# Patient Record
Sex: FEMALE | Race: WHITE | Employment: FULL TIME | RURAL
[De-identification: names, ages, dates, MRNs, and addresses within clinical notes are randomized per-mention and may not be internally consistent; named-entity substitution may affect disease eponyms.]

---

## 2018-10-17 PROBLEM — F31.81 BIPOLAR II DISORDER, MOST RECENT EPISODE MAJOR DEPRESSIVE (HCC): Chronic | Status: ACTIVE | Noted: 2018-10-17

## 2018-10-17 PROBLEM — M32.9 LUPUS (SYSTEMIC LUPUS ERYTHEMATOSUS) (HCC): Status: ACTIVE | Noted: 2018-10-17

## 2018-10-17 PROBLEM — F31.81 BIPOLAR II DISORDER (HCC): Status: ACTIVE | Noted: 2018-10-17

## 2018-10-17 PROBLEM — M79.7 FIBROMYALGIA: Status: ACTIVE | Noted: 2018-10-17

## 2018-10-19 PROBLEM — I10 HYPERTENSION: Status: ACTIVE | Noted: 2018-10-19

## 2018-10-19 PROBLEM — E11.9 DIABETES (HCC): Status: ACTIVE | Noted: 2018-10-19

## 2018-10-19 PROBLEM — L03.116 CELLULITIS OF LEFT LOWER EXTREMITY: Status: ACTIVE | Noted: 2017-05-08

## 2018-10-21 PROBLEM — E78.1 PURE HYPERGLYCERIDEMIA: Status: ACTIVE | Noted: 2018-10-21

## 2018-10-21 PROBLEM — E11.8 CONTROLLED TYPE 2 DIABETES MELLITUS WITH COMPLICATION, WITHOUT LONG-TERM CURRENT USE OF INSULIN (HCC): Status: ACTIVE | Noted: 2018-10-19

## 2018-10-21 PROBLEM — L03.116 CELLULITIS OF LEFT LOWER EXTREMITY: Status: RESOLVED | Noted: 2017-05-08 | Resolved: 2018-10-21

## 2018-12-07 PROBLEM — E66.01 SEVERE OBESITY (HCC): Status: ACTIVE | Noted: 2018-12-07

## 2018-12-09 PROBLEM — S93.409A GRADE 2 ANKLE SPRAIN: Status: ACTIVE | Noted: 2018-12-09

## 2018-12-27 PROBLEM — D25.9 UTERINE LEIOMYOMA: Status: ACTIVE | Noted: 2018-11-26

## 2018-12-27 PROBLEM — Q89.01 ASPLENIA: Status: ACTIVE | Noted: 2018-12-27

## 2019-03-05 PROBLEM — S83.242A ACUTE MEDIAL MENISCUS TEAR OF LEFT KNEE: Status: RESOLVED | Noted: 2019-03-05 | Resolved: 2019-03-05

## 2019-03-05 PROBLEM — S83.242A ACUTE MEDIAL MENISCUS TEAR OF LEFT KNEE: Status: ACTIVE | Noted: 2019-03-05

## 2020-04-07 ENCOUNTER — OFFICE VISIT (OUTPATIENT)
Dept: PRIMARY CARE CLINIC | Age: 55
End: 2020-04-07

## 2020-04-07 VITALS — OXYGEN SATURATION: 94 % | TEMPERATURE: 98.8 F | HEART RATE: 93 BPM

## 2020-04-07 DIAGNOSIS — R05.9 COUGH: Primary | ICD-10-CM

## 2020-04-07 LAB
FLUAV+FLUBV AG NOSE QL IA.RAPID: NEGATIVE POS/NEG
FLUAV+FLUBV AG NOSE QL IA.RAPID: NEGATIVE POS/NEG
S PYO AG THROAT QL: NEGATIVE
VALID INTERNAL CONTROL?: YES
VALID INTERNAL CONTROL?: YES

## 2020-04-07 NOTE — PROGRESS NOTES
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 She also reports: low grade fevers, headache, sinus congestion, rhinorrhea, post nasal drip, sore throat, fatigue and myalgias. She denies a history of: productive cough and chest congestion. Treatments have included: OTC products, which have been not very effective. Relevant medical problems include include: lupus. Recent Travel Screening and Travel History documentation     Travel Screening      No screening recorded since 04/06/20 0000      Travel History   Travel since 03/07/20     No documented travel since 03/07/20                Review of Systems   Constitutional: Positive for chills, diaphoresis, fever and malaise/fatigue. HENT: Positive for congestion and sore throat. Eyes: Negative. Respiratory: Negative for shortness of breath and wheezing. Cardiovascular: Negative. Gastrointestinal: Positive for diarrhea and nausea. Negative for blood in stool and melena. Genitourinary: Negative. Musculoskeletal: Positive for myalgias. Skin: Negative for rash. Neurological: Positive for headaches. Negative for dizziness.     - Pertinent items are noted in HPI      Patient Active Problem List   Diagnosis Code    Bipolar II disorder, most recent episode major depressive ([facility] [address] F31.81    Lupus (systemic lupus erythematosus) ([facility] [address] M32.9    Fibromyalgia M79.7    Leukocytosis D72.829    Malaise and fatigue R53.81, R53.83    Hypertension I10    Controlled type 2 diabetes mellitus with complication, without long-term current use of insulin ([address] E11.8    Pure hyperglyceridemia E78.1    Severe obesity (HCC) E66.01    Grade 2 ankle sprain S93.409A    Uterine leiomyoma D25.9    Asplenia Q89.01     Home Medications    Medication Sig Start Date End Date Taking? Authorizing Provider   losartan (COZAAR) 100 mg tablet TK 1 T PO QD 1/30/20   Provider, Historical   hydroxychloroquine (Plaquenil) 200 mg tablet Take 200 mg by mouth daily. Provider, Historical   spironolactone (ALDACTONE) 25 mg tablet TAKE 1 TABLET BY MOUTH DAILY 3/25/20   Tammie Lam DO   rosuvastatin (CRESTOR) 10 mg tablet TAKE 1 TABLET BY MOUTH ONCE DAILY 3/23/20   Maya Zuniga NP   FLUoxetine (PROZAC) 20 mg capsule Take 1 Cap by mouth daily. 1/22/20   Naomi Robert MD   OLANZapine (ZYPREXA) 2.5 mg tablet Take 1 Tab by mouth nightly. 1/22/20   Naomi Robert MD   meloxicam (MOBIC) 7.5 mg tablet TK 1 T PO QD WITH FOOD 1/15/20   Provider, Historical   metFORMIN ER (GLUCOPHAGE XR) 500 mg tablet take 1 tablet by mouth once daily with dinner 12/20/19   Tabitha Hernandez NP   clonazePAM (KLONOPIN) 0.5 mg tablet Take 1 Tab by mouth daily as needed (Anxiety). 12/10/19   MD PAXTON Salgado Mc VERIO strip as directed TEST 3 TIMES A WEEK 8/26/19   Tabitha Hernandez NP   aspirin delayed-release 81 mg tablet Take  by mouth daily. Provider, Historical   lancets (ONE TOUCH DELICA) 33 gauge misc OneTouch Delica Lancets 33 gauge    Provider, Historical   albuterol (PROAIR HFA) 90 mcg/actuation inhaler INHALE 1 TO 2 PUFFS BY INHALATION ROUTE EVERY 4 TO 6 HOURS 9/22/17   Provider, Historical   EPINEPHrine (EPIPEN) 0.3 mg/0.3 mL injection INJECT 0.3 MILLILITER BY INTRAMUSCULAR ROUTE ONCE AS NEEDED 9/22/17   Provider, Historical   tiZANidine (ZANAFLEX) 6 mg capsule  4/24/17   Provider, Historical      Allergies   Allergen Reactions    Latex Unable to Obtain    Other Food Anaphylaxis     Pitted Fruits.     [place] Anaphylaxis    Ciprofloxacin Unable to Obtain    Levaquin [Levofloxacin] Unable to Obtain    Morphine Unable to Obtain    Pcn [Penicillins] Unable to Obtain    Vancomycin Unable to Obtain          Objective:   Physical Exam  General:  alert, cooperative, no distress, appears fatigued   Ears: normal TM's and external ear canals AU   Sinuses: Normal paranasal sinuses without tenderness   Mouth:  Posterior oropharyngeal erythema with post nasal drip   Neck: supple, symmetrical, trachea midline and no adenopathy. Heart: tachycardia, no murmurs noted, no gallops noted. Lungs: clear to auscultation bilaterally       Visit Vitals  Pulse 93   Temp 98.8 °F (37.1 °C) (Oral)   SpO2 94%         Assessment/Plan:        Possible COVID.    - AMB POC RAPID STREP A - negative  - AMB POC BINAX NOW INFLUENZA TEST - negative  2. Tylenol for elevated temp; oral hydration; The patient was advised to return to (or contact) the clinic or go to the ER for any ALARM sx such as temp > 101.5, worsening cough, sputum production, confusion or shortness of breath. Luis Angel [address]:  [phone]  4. Quarantine:  14 days          Tylenol for elevated temp; oral hydration; The patient was advised to return to (or contact) the clinic or go to the ER for any ALARM sx such as temp > 101.5, worsening cough, sputum production, confusion or shortness of breath. Reviewed with patient that COVID-19 pandemic is an evolving situation with rapidly changing recommendations & guidelines. Medical decisions are made based on the the best information available at the time. Recommended patient stay tuned for updates published by trusted sources and to advise your PCP of any unexpected changes in clinical condition. Reviewed with male that COVID-19 pandemic is an evolving situation with rapidly changing recommendations & guidelines. Medical decisions are made based on the the best information available at the time.   Recommended male stay tuned for updates published by trusted sources and to advise your PCP of any unexpected changes in clinical condition    Reviewed with her that COVID-19 pandemic is an evolving situation with rapidly changing recommendations & guidelines. Medical decisions are made based on the the best information available at the time. Recommended she stay tuned for updates published by trusted sources and to advise your PCP of any unexpected changes in clinical condition     Disclaimer:  Discussed expected course/resolution/complications of diagnosis in detail with patient. Medication risks/benefits/costs/interactions/alternatives discussed with patient. She was given an after visit summary which includes diagnoses, current medications, & vitals. She expressed understanding with the diagnosis and plan.         Marly Martinez, DO

## 2020-06-09 ENCOUNTER — OFFICE VISIT (OUTPATIENT)
Dept: PRIMARY CARE CLINIC | Age: 55
End: 2020-06-09

## 2020-06-09 VITALS — OXYGEN SATURATION: 95 % | TEMPERATURE: 99 F | HEART RATE: 68 BPM

## 2020-06-09 DIAGNOSIS — Z20.822 EXPOSURE TO 2019 NOVEL CORONAVIRUS: Primary | ICD-10-CM

## 2020-06-09 NOTE — PROGRESS NOTES
Recent Travel Screening and Travel History documentation     Travel Screening      No screening recorded since 06/08/20 0000      Travel History   Travel since 05/09/20     No documented travel since 05/09/20                Subjective:   Ashwin Lock was seen today for Fever (started Thursday)            Recent Travel Screening and Travel History documentation     Travel Screening      No screening recorded since 06/08/20 0000      Travel History   Travel since 05/09/20     No documented travel since 05/09/20                ROS - Pertinent items are noted in HPI      Patient Active Problem List   Diagnosis Code    Bipolar II disorder, most recent episode major depressive ([facility] [address] F31.81    Lupus (systemic lupus erythematosus) ([facility]) M32.9    Fibromyalgia M79.7    Leukocytosis D72.829    Malaise and fatigue R53.81, R53.83    Hypertension I10    Controlled type 2 diabetes mellitus with complication, without long-term current use of insulin ([facility]) E11.8    Pure hyperglyceridemia E78.1    Severe obesity ([facility]) E66.01    Grade 2 ankle sprain S93.409A    Uterine leiomyoma D25.9    Asplenia Q89.01     Home Medications    Medication Sig Start Date End Date Taking? Authorizing Provider   guaiFENesin-dextromethorphan (TUSSI-ORGANIDIN DM)  mg/5 mL liqd Take 10 mL by mouth every four (4) hours as needed for Cough. 4/13/20   Rosa Maria Mercedes, DO   albuterol (ProAir HFA) 90 mcg/actuation inhaler Take 2 Puffs by inhalation every four (4) hours as needed for Wheezing or Shortness of Breath. 4/13/20   Angelita Lam,    losartan (COZAAR) 100 mg tablet TK 1 T PO QD 4/7/20   Tammie Lam DO   hydroxychloroquine (Plaquenil) 200 mg tablet Take 200 mg by mouth daily.     Provider, Historical   spironolactone (ALDACTONE) 25 mg tablet TAKE 1 TABLET BY MOUTH DAILY 3/25/20   Tammie Lam DO   rosuvastatin (CRESTOR) 10 mg tablet TAKE 1 TABLET BY MOUTH ONCE DAILY 3/23/20   Yuli Landon NP   FLUoxetine (PROZAC) 20 mg capsule Take 1 Cap by mouth daily. 1/22/20   Marcelino Mora MD   OLANZapine (ZYPREXA) 2.5 mg tablet Take 1 Tab by mouth nightly. 1/22/20   Marcelino Mora MD   meloxicam (MOBIC) 7.5 mg tablet TK 1 T PO QD WITH FOOD 1/15/20   Provider, Historical   metFORMIN ER (GLUCOPHAGE XR) 500 mg tablet take 1 tablet by mouth once daily with dinner 12/20/19   Calista Reis NP   clonazePAM (KLONOPIN) 0.5 mg tablet Take 1 Tab by mouth daily as needed (Anxiety). 12/10/19   Marcelino Mora MD   ONETOUCH VERIO strip as directed TEST 3 TIMES A WEEK 8/26/19   Calista Reis NP   aspirin delayed-release 81 mg tablet Take  by mouth daily. Provider, Historical   lancets (ONE TOUCH DELICA) 33 gauge misc OneTouch Delica Lancets 33 gauge    Provider, Historical   EPINEPHrine (EPIPEN) 0.3 mg/0.3 mL injection INJECT 0.3 MILLILITER BY INTRAMUSCULAR ROUTE ONCE AS NEEDED 9/22/17   Provider, Historical   tiZANidine (ZANAFLEX) 6 mg capsule  4/24/17   Provider, Historical      Allergies   Allergen Reactions    Latex Unable to Obtain    Other Food Anaphylaxis     Pitted Fruits.  [place] Anaphylaxis    Ciprofloxacin Unable to Obtain    Levaquin [Levofloxacin] Unable to Obtain    Morphine Unable to Obtain    Pcn [Penicillins] Unable to Obtain    Vancomycin Unable to Obtain          Objective:   Physical Exam  General:  alert, cooperative, no distress   eyes: Mild injection   Sinuses: Normal paranasal sinuses without tenderness   Mouth:  Lips, mucosa, and tongue normal. Teeth and gums normal   Neck: supple, symmetrical, trachea midline and no adenopathy. Heart: normal rate, regular rhythm, normal S1, S2, no murmurs, rubs, clicks or gallops. Lungs: clear to auscultation bilaterally       Visit Vitals  Pulse 68   Temp 99 °F (37.2 °C) (Oral)   SpO2 95%         Assessment/Plan:        Possible COVID. covid testing done 06/09/20   2. Tylenol for elevated temp; oral hydration;  The patient was advised to return to (or contact) the clinic or go to the ER for any ALARM sx such as temp > 101.5, worsening cough, sputum production, confusion or shortness of breath. Luis Angelyaniv Priestutashlie Hernandez [address]:  [phone]  4. Quarantine:  14 days      Tylenol for elevated temp; oral hydration; The patient was advised to return to (or contact) the clinic or go to the ER for any ALARM sx such as temp > 101.5, worsening cough, sputum production, confusion or shortness of breath. Reviewed with patient that COVID-19 pandemic is an evolving situation with rapidly changing recommendations & guidelines. Medical decisions are made based on the the best information available at the time. Recommended patient stay tuned for updates published by trusted sources and to advise your PCP of any unexpected changes in clinical condition. Reviewed with male that COVID-19 pandemic is an evolving situation with rapidly changing recommendations & guidelines. Medical decisions are made based on the the best information available at the time. Recommended male stay tuned for updates published by trusted sources and to advise your PCP of any unexpected changes in clinical condition    Reviewed with her that COVID-19 pandemic is an evolving situation with rapidly changing recommendations & guidelines. Medical decisions are made based on the the best information available at the time. Recommended she stay tuned for updates published by trusted sources and to advise your PCP of any unexpected changes in clinical condition     Disclaimer:  Discussed expected course/resolution/complications of diagnosis in detail with patient. Medication risks/benefits/costs/interactions/alternatives discussed with patient. She was given an after visit summary which includes diagnoses, current medications, & vitals. She expressed understanding with the diagnosis and plan.         Ximena Meth, DO

## 2020-06-09 NOTE — LETTER
NOTIFICATION RETURN TO WORK / SCHOOL 
 
6/9/2020 Ms. Ferny Taylor [place] [phone] To Whom It May Concern: 
 
Ferny Taylor was tested for COVID-19 on 6/9/2020 She may return to work after 3 days of being fever free and with a negative covid result, which is pending as of today. Karine Arvizu If there are questions or concerns, please have the patient contact our office. Sincerely, Monica Jacome, DO

## 2020-06-09 NOTE — LETTER
NOTIFICATION RETURN TO WORK / SCHOOL 
 
6/9/2020 Ms. Marciano Salvador [facility] [phone] To Whom It May Concern: 
 
Marciano Salvador was tested for COVID-19 on 6/9/2020, and the result was negative. She may return to work after 3 days of being fever free. If there are questions or concerns, please have the patient contact our office. Sincerely, Ruben Felder, DO

## 2020-06-12 ENCOUNTER — PATIENT MESSAGE (OUTPATIENT)
Dept: PRIMARY CARE CLINIC | Age: 55
End: 2020-06-12

## 2020-06-12 LAB — SARS-COV-2, NAA: NOT DETECTED

## 2020-06-17 NOTE — TELEPHONE ENCOUNTER
From: Mina Judge  To: Marly Martinez DO  Sent: 6/12/2020 8:43 AM EDT  Subject: Test Results Question    I have a question about NOVEL CORONAVIRUS (COVID-19) resulted on 6/12/20 at 8:35 AM. Still having nasal congestion and fever of 99.4 to 99.8. Just wantes you to know. Thank you.  Arelis Davila

## 2020-08-11 ENCOUNTER — OFFICE VISIT (OUTPATIENT)
Dept: PRIMARY CARE CLINIC | Age: 55
End: 2020-08-11

## 2020-08-11 DIAGNOSIS — Z20.828 EXPOSURE TO SARS-ASSOCIATED CORONAVIRUS: ICD-10-CM

## 2020-08-11 DIAGNOSIS — J02.9 SORE THROAT: Primary | ICD-10-CM

## 2020-08-11 LAB
S PYO AG THROAT QL: NEGATIVE
VALID INTERNAL CONTROL?: YES

## 2020-08-11 NOTE — PROGRESS NOTES
Pt presents to the flu clinic for covid testing. Pt reports mild sx and possible exposure. Pt declined to see a doctor today.  KT

## 2020-08-13 LAB — SARS-COV-2, NAA: NOT DETECTED

## 2021-01-13 ENCOUNTER — PATIENT MESSAGE (OUTPATIENT)
Dept: FAMILY MEDICINE CLINIC | Age: 56
End: 2021-01-13

## 2021-01-13 NOTE — LETTER
NOTIFICATION RETURN TO WORK  
 
1/18/2021 3:13 PM 
 
Ms. Guy Cowart [place] [phone] To Whom It May Concern: 
 
Guy Cowart is currently under the care of Sarah Chu. She will return to work on: 1/18/2021 and should be excused from 01/13/2021 - 1/15/2021. If there are questions or concerns please have the patient contact our office. Sincerely, Lauren Atwood, DO

## 2021-01-18 NOTE — TELEPHONE ENCOUNTER
From: [facility]  To: Marylinmarge YignmanDO  Sent: 1/13/2021 6:55 PM EST  Subject: Visit Follow-Up Question    Hello. I forgot to ask you for a work note for 1-13 thru 1-15 and to return to work on 1-18. I can print it from my chart. Thank you.    Kelby Du

## 2021-01-20 ENCOUNTER — PATIENT MESSAGE (OUTPATIENT)
Dept: FAMILY MEDICINE CLINIC | Age: 56
End: 2021-01-20

## 2021-01-21 NOTE — TELEPHONE ENCOUNTER
From: Abilio Sutherland  To: Aristeo Gould DO  Sent: 1/20/2021 9:42 PM EST  Subject: Non-Urgent Medical Question    Hello. I received a message about the covid vaccine. Do you have it in for pts? I did get the first vaccine Bernannemarie Landaverde) on 1-14-21. Could I get the 2nd vaccine booster at this office? Im due to receive it 2-11-21.    Thank you  Corrine Orozco

## 2021-03-17 ENCOUNTER — TELEPHONE (OUTPATIENT)
Dept: FAMILY MEDICINE CLINIC | Age: 56
End: 2021-03-17

## 2021-03-17 DIAGNOSIS — M32.9 SYSTEMIC LUPUS ERYTHEMATOSUS, UNSPECIFIED SLE TYPE, UNSPECIFIED ORGAN INVOLVEMENT STATUS (HCC): ICD-10-CM

## 2021-03-17 DIAGNOSIS — Q89.01 ASPLENIA: ICD-10-CM

## 2021-03-17 DIAGNOSIS — E11.8 CONTROLLED TYPE 2 DIABETES MELLITUS WITH COMPLICATION, WITHOUT LONG-TERM CURRENT USE OF INSULIN (HCC): ICD-10-CM

## 2021-03-17 DIAGNOSIS — I10 ESSENTIAL HYPERTENSION: ICD-10-CM

## 2021-03-17 DIAGNOSIS — E78.1 PURE HYPERGLYCERIDEMIA: Primary | ICD-10-CM

## 2021-03-17 NOTE — TELEPHONE ENCOUNTER
Pt is scheduled to see Dr. Nadia Rhoades April 21st. Pt wants to know if she needs to come before her appt for labs?

## 2021-03-18 ENCOUNTER — TELEPHONE (OUTPATIENT)
Dept: FAMILY MEDICINE CLINIC | Age: 56
End: 2021-03-18

## 2021-03-18 DIAGNOSIS — K52.9 CHRONIC DIARRHEA OF UNKNOWN ORIGIN: Primary | ICD-10-CM

## 2021-03-18 NOTE — TELEPHONE ENCOUNTER
----- Message from Stew Plascencia sent at 3/18/2021  2:43 PM EDT -----  Regarding: Do Good/Telephone  Appointment not available    Caller's first and last name and relationship to patient (if not the patient): n/a      Best contact number: [phone]      Preferred date and time: as soon as possible       Scheduled appointment date and time:      Reason for appointment: pains and burning in stomach, diarrhea       Details to clarify the request: n/a      Stew Plascencia

## 2021-03-18 NOTE — TELEPHONE ENCOUNTER
Pt asked if she could stop by tomorrow and  a stool sample kit for her issues. See previous message. Call pt to let her know if she can stop by tomorrow to  the kit?  LVM if pt doesn't answer

## 2021-03-22 DIAGNOSIS — K52.9 CHRONIC DIARRHEA OF UNKNOWN ORIGIN: ICD-10-CM

## 2021-03-23 ENCOUNTER — HOSPITAL ENCOUNTER (OUTPATIENT)
Dept: BEHAVIORAL/MENTAL HEALTH | Age: 56
Discharge: HOME | End: 2021-03-23

## 2021-03-23 LAB
CAMPYLOBACTER SPECIES, DNA: NEGATIVE
ENTEROTOXIGEN E COLI, DNA: NEGATIVE
P SHIGELLOIDES DNA STL QL NAA+PROBE: NEGATIVE
SALMONELLA SPECIES, DNA: NEGATIVE
SHIGA TOXIN PRODUCING, DNA: NEGATIVE
SHIGELLA SP+EIEC IPAH STL QL NAA+PROBE: NEGATIVE
VIBRIO SPECIES, DNA: NEGATIVE
WBC #/AREA STL HPF: NORMAL /HPF (ref 0–4)
Y. ENTEROCOLITICA, DNA: NEGATIVE

## 2021-03-23 RX ORDER — OLANZAPINE 5 MG/1
5 TABLET ORAL
Qty: 30 TAB | Refills: 5 | Status: SHIPPED | OUTPATIENT
Start: 2021-03-23 | End: 2021-05-03 | Stop reason: SDUPTHER

## 2021-03-23 RX ORDER — FLUOXETINE HYDROCHLORIDE 20 MG/1
20 CAPSULE ORAL DAILY
Qty: 30 CAP | Refills: 5 | Status: SHIPPED | OUTPATIENT
Start: 2021-03-23 | End: 2021-09-22 | Stop reason: SDUPTHER

## 2021-03-23 NOTE — PROGRESS NOTES
Consent: The patient and/or their healthcare decision maker is aware that they may receive a bill for this audio only encounter, depending on their insurance coverage, and has provided verbal consent to proceed: Yes.     INTERVAL HISTORY (Audio Only): Mrs. Ferrer is a 64-year-old  white female who is following up with me 7 months since her last appointment re: a history of Bipolar disorder (type II) with a predominance of depressive symptoms/episodes, along with some mixed Anxiety symptoms.  She had been followed by several psychiatrists in [address] [place] [address] since 1997, but over 3 years ago she and her  moved to [place] related to his work, and she wanted to be followed closer to home. 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 She was feeling \"really good\" the last two appointments.     She states that she's been feeling more \"rodas\" and irritable for awhile now. She feels it's mostly the Bipolar disorder rather than the various stressors that she's dealing with. She hasn't had any true manic or hypomanic episodes, but she's experiencing more mixed affective symptoms overall. She's had some moments of dysphoria, but no feelings of hopelessness or any SI at all. We discussed Rx options and we'll first increase the Olanzapine slightly to 5 mg qhs to provide better mood stability and less irritability.  No SE's reported or noted with the current meds including no weight gain, EPSE's, TD sx's, etc.      CURRENT MEDICATIONS:  1.  Prozac 20 mg daily  2.  Zyprexa 2.5 mg qhs--since 2009 (highest dose was 7.5 mg)  3.  Clonazepam 0.5 mg bid prn--taking 0.25 mg bid      MENTAL STATUS EXAM:  May was noted to be very pleasant and cooperative, but reported having some affective instability, albeit fairly mild. She currently was fairly euthymic, but she noted that her mood can change more easily of late. She still denied having any significant neurovegetative dysfunction, and denied any feelings of hopelessness, suicidal thinking, or any passive thoughts about wishing she were dead. There was no evidence of any ebony or hypomania at all, and she denied any symptoms of anxiety as well. There was no evidence of any psychosis such as hallucinations or delusional thinking.  Her judgment and insight appears to be quite good.  Her cognitive functioning appears to be fully intact with no deficits noted.     DIAGNOSTIC IMPRESSION:  AXIS I:  Bipolar disorder type II, some increased mixed symptoms (F31.81)                Unspecified Anxiety disorder (F41.9)  AXIS II:  Deferred. AXIS III:  Systemic lupus; fibromyalgia; hypertension; hyperglycemia; hypercholesterolemia     PLAN:  1.  Continue the Prozac at 20 mg daily--#30 with 5 refills (30 day). 2.  Increase the Zyprexa to 5 mg at bedtime--#30 with 5 refills (30 day). May drop back to 2.5 mg at some point. 3.  Continue the Clonazepam at 0.5 mg bid prn--has 4- months of refills (30 day). 4.  Follow up with me in 6 months, sooner if needed.     I affirm this is a Patient-Initiated-Episode with a patient who has not had a related appointment within my department in the past 7 days or scheduled within the next 24 hours. Total Time: 13 minutes  Note: not billable if the call serves to triage the patient into an appointment for the relevant concern. Patient was evaluated by phone call and had no access to a computer or smart phone. Chart reviewed. Evaluated and management per the note above. POS: patient at home; provider in office.

## 2021-03-24 ENCOUNTER — TELEPHONE (OUTPATIENT)
Dept: FAMILY MEDICINE CLINIC | Age: 56
End: 2021-03-24

## 2021-03-24 LAB
G LAMBLIA AG STL QL IA: NEGATIVE
SPECIMEN SOURCE: NORMAL

## 2021-03-24 NOTE — TELEPHONE ENCOUNTER
Thanks - can we reach out to the patient and have her  one of the test kits that we can send in?   CLLJGW!@

## 2021-03-24 NOTE — TELEPHONE ENCOUNTER
Received a call from Susanna Sanz at [facility] she states that they received a bunch of stool studies for the patient. She states that a test for occult blood was ordered but that is not a test they preform.     She recommended a fit test be sent to [address]

## 2021-03-25 ENCOUNTER — PATIENT MESSAGE (OUTPATIENT)
Dept: FAMILY MEDICINE CLINIC | Age: 56
End: 2021-03-25

## 2021-03-25 DIAGNOSIS — K29.70 GASTRITIS, PRESENCE OF BLEEDING UNSPECIFIED, UNSPECIFIED CHRONICITY, UNSPECIFIED GASTRITIS TYPE: Primary | ICD-10-CM

## 2021-03-25 LAB
CALPROTECTIN STL-MCNT: 44 UG/G (ref 0–120)
H PYLORI AG STL QL IA: NEGATIVE
SPECIMEN SOURCE: NORMAL

## 2021-03-25 NOTE — PROGRESS NOTES
Results and comments sent through my chart to patient. Stool studies were negative for H. pylori, Giardia, or different types of enteric bacteria. We are still waiting on the results of a few other labs for the stool studies. Hope you are doing well!

## 2021-03-26 NOTE — PROGRESS NOTES
Results and comments sent through my chart to patient. Still waiting for the other labs come back but at least we can say that the fecal calprotectin is negative.   This test is used to detect inflammation

## 2021-03-29 LAB — LACTOFERRIN, LCTFLT: <1 UG/ML(G) (ref 0–7.24)

## 2021-03-29 NOTE — TELEPHONE ENCOUNTER
From: Saba Garcia  To: Kike Fonseca DO  Sent: 3/25/2021 1:45 PM EDT  Subject: Test Results Question    Hello. Still having daily stomach pain/burning. Taking pepcid 20mg a day. Do I need scan or upper GI? Surgical specialists in [place] do uppers. Thank you.  Andriy Spicer

## 2021-03-30 LAB
FAT STL QL: NORMAL
NEUTRAL FAT STL QL: NORMAL

## 2021-03-30 NOTE — PROGRESS NOTES
Results and comments sent through my chart to patient. Fecal fat and lactoferrin are both negative. The fecal fat would indicate if there was any problems with absorption and the lactoferrin which show if there is any inflammation. How are you doing?

## 2021-04-12 ENCOUNTER — OFFICE VISIT (OUTPATIENT)
Dept: SURGERY | Age: 56
End: 2021-04-12
Payer: COMMERCIAL

## 2021-04-12 VITALS
HEART RATE: 92 BPM | BODY MASS INDEX: 35.67 KG/M2 | OXYGEN SATURATION: 97 % | TEMPERATURE: 98.2 F | SYSTOLIC BLOOD PRESSURE: 147 MMHG | DIASTOLIC BLOOD PRESSURE: 80 MMHG | RESPIRATION RATE: 16 BRPM | WEIGHT: 195 LBS

## 2021-04-12 DIAGNOSIS — R10.10 UPPER ABDOMINAL PAIN: Primary | ICD-10-CM

## 2021-04-12 PROCEDURE — 99204 OFFICE O/P NEW MOD 45 MIN: CPT | Performed by: SURGERY

## 2021-04-12 NOTE — PROGRESS NOTES
1. Have you been to the ER, urgent care clinic since your last visit? Hospitalized since your last visit? new pt    2. Have you seen or consulted any other health care providers outside of the [address] [place] [address] since your last visit? Include any pap smears or colon screening.  new pt

## 2021-04-12 NOTE — PATIENT INSTRUCTIONS
Upper GI Endoscopy: Before Your Procedure What is an upper GI endoscopy? An upper gastrointestinal (or GI) endoscopy is a test that allows your doctor to look at the inside of your esophagus, stomach, and the first part of your small intestine, called the duodenum. The esophagus is the tube that carries food to your stomach. The doctor uses a thin, lighted tube that bends. It is called an endoscope, or scope. The doctor puts the tip of the scope in your mouth and gently moves it down your throat. The scope is a flexible video camera. The doctor looks at a monitor (like a TV set or a computer screen) as he or she moves the scope. A doctor may do this procedure to look for ulcers, tumors, infection, or bleeding. It also can be used to look for signs of acid backing up into your esophagus. This is called gastroesophageal reflux disease, or GERD. The doctor can use the scope to take a sample of tissue for study (a biopsy). The doctor also can use the scope to take out growths or stop bleeding. Follow-up care is a key part of your treatment and safety. Be sure to make and go to all appointments, and call your doctor if you are having problems. It's also a good idea to know your test results and keep a list of the medicines you take. How do you prepare for the procedure? Procedures can be stressful. This information will help you understand what you can expect. And it will help you safely prepare for your procedure. Preparing for the procedure 
  · Do not eat or drink anything for 6 to 8 hours before the test. An empty stomach helps your doctor see your stomach clearly during the test. It also reduces your chances of vomiting. If you vomit, there is a small risk that the vomit could enter your lungs.  (This is called aspiration.) If the test is done in an emergency, a tube may be inserted through your nose or mouth to empty your stomach.  
  · Do not take sucralfate (Carafate) or antacids on the day of the test. These medicines can make it hard for your doctor to see your upper GI tract.  
  · If your doctor tells you to, stop taking iron supplements 7 to 14 days before the test.  
  · Be sure you have someone to take you home. Anesthesia and pain medicine will make it unsafe for you to drive or get home on your own.  
  · Understand exactly what procedure is planned, along with the risks, benefits, and other options. · Tell your doctor ALL the medicines, vitamins, supplements, and herbal remedies you take. Some may increase the risk of problems during your procedure. Your doctor will tell you if you should stop taking any of them before the procedure and how soon to do it.  
  · If you take aspirin or some other blood thinner, ask your doctor if you should stop taking it before your procedure. Make sure that you understand exactly what your doctor wants you to do. These medicines increase the risk of bleeding.  
  · Make sure your doctor and the hospital have a copy of your advance directive. If you don't have one, you may want to prepare one. It lets others know your health care wishes. It's a good thing to have before any type of surgery or procedure. What happens on the day of the procedure? · Follow the instructions exactly about when to stop eating and drinking. If you don't, your procedure may be canceled. If your doctor told you to take your medicines on the day of the procedure, take them with only a sip of water.  
  · Take a bath or shower before you come in for your procedure. Do not apply lotions, perfumes, deodorants, or nail polish.  
  · Take off all jewelry and piercings. And take out contact lenses, if you wear them. At the hospital or surgery center · Bring a picture ID.  
  · The test may take 15 to 30 minutes.  
  · The doctor may spray medicine on the back of your throat to numb it. You also will get medicine to prevent pain and to relax you.  
  · You will lie on your left side.  The doctor will put the scope in your mouth and toward the back of your throat. The doctor will tell you when to swallow. This helps the scope move down your throat. You will be able to breathe normally. The doctor will move the scope down your esophagus into your stomach. The doctor also may look at the duodenum.  
  · If your doctor wants to take a sample of tissue for a biopsy, he or she may use small surgical tools, which are put into the scope, to cut off some tissue. You will not feel a biopsy, if one is taken. The doctor also can use the tools to stop bleeding or to do other treatments, if needed.  
  · You will stay at the hospital or surgery center for 1 to 2 hours until the medicine you were given wears off. What happens after an upper GI endoscopy? · After the test, you may belch and feel bloated for a while.  
  · You may have a tickling, dry throat or mouth. You may feel a bit hoarse, and you may have a mild sore throat. These symptoms may last several days. Throat lozenges and warm saltwater gargles can help relieve the throat symptoms.  
  · Don't drive or operate machinery for 12 hours after the test.  
  · Your doctor will tell you when you can go back to your usual diet and activities.  
  · Don't drink alcohol for 12 to 24 hours after the test.  
When should you call your doctor? · You have questions or concerns.  
  · You don't understand how to prepare for your procedure.  
  · You become ill before the procedure (such as fever, flu, or a cold).  
  · You need to reschedule or have changed your mind about having the procedure. Where can you learn more? Go to http://www.gray.com/ Enter P790 in the search box to learn more about \"Upper GI Endoscopy: Before Your Procedure. \" Current as of: April 15, 2020               Content Version: 12.8 © 3407-9403 [employer].   
Care instructions adapted under license by [employer] (which disclaims liability or warranty for this information). If you have questions about a medical condition or this instruction, always ask your healthcare professional. [place] [address] any warranty or liability for your use of this information.

## 2021-04-12 NOTE — PROGRESS NOTES
Violetta Loaiza is a 48 y.o. female who presents today with the following:  Chief Complaint   Patient presents with    Abdominal Pain       HPI    60-year-old female who presents to us as a referral by Dr. Km Mejia for abdominal pain that has been present for approximately 8 months. She states most of this pain is toward the left upper quadrant. At that point she was having diarrhea and states that she was diagnosed with Campylobacter and treated with Flagyl. The symptoms have resolved in terms of the diarrhea but the pain in the left upper quadrant still persist.  It is not associated with nausea or vomiting or reflux symptoms. She states that typically as soon as she eats within a few seconds she will develop left upper quadrant abdominal pain with no radiation. Again there is no nausea or vomiting with this. It will typically last 10 to 15 minutes and then resolve. She had tried a course of a PPI with no real improvement and was told to discontinue this because this could affect her bone density and started on Pepcid 40 mg a day about a month ago. She states this has not resulted in significant improvement. She describes her pain as a crampy, burning pain. She has gained approximately 20 pounds over the last 8 months. She had a colonoscopy out 3 years ago and stated they found small polyps. About 8 months ago she started taking meloxicam but stopped it about a month and a half ago when she saw a rheumatologist and told her that she was having abdominal pain. She states she carries a diagnosis of fibromyalgia and lupus. 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 She does not drink or smoke.       Past Medical History:   Diagnosis Date    Depression     Diabetes ([facility]     Fibromyalgia     Hypertension     Lupus (systemic lupus erythematosus) ([facility]     Menopause        Past Surgical History:   Procedure Laterality Date    HX CYST REMOVAL      HX DILATION AND CURETTAGE  2015    HX SPLENECTOMY         Social History     Socioeconomic History    Marital status:      Spouse name: Not on file    Number of children: Not on file    Years of education: Not on file    Highest education level: Not on file   Occupational History    Occupation: [employer]   Social Needs    Financial resource strain: Not on file    Food insecurity     Worry: Not on file     Inability: Not on file    Transportation needs     Medical: Not on file     Non-medical: Not on file   Tobacco Use    Smoking status: Never Smoker    Smokeless tobacco: Never Used   Substance and Sexual Activity    Alcohol use: No     Frequency: Never     Binge frequency: Never    Drug use: No    Sexual activity: Yes   Lifestyle    Physical activity     Days per week: 3 days     Minutes per session: 30 min    Stress: Not at all   Relationships    Social connections     Talks on phone: Not on file     Gets together: Not on file     Attends Gnosticist service: Not on file     Active member of club or organization: Not on file     Attends meetings of clubs or organizations: Not on file     Relationship status: Not on file    Intimate partner violence     Fear of current or ex partner: Not on file     Emotionally abused: Not on file     Physically abused: Not on file     Forced sexual activity: Not on file   Other Topics Concern     Service No    Blood Transfusions No    Caffeine Concern No    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  Hobby Hazards No    Sleep Concern No    Stress Concern Yes    Weight Concern Yes    Special Diet Yes     Comment: Food Allergies & Diabetic    Back Care Yes    Exercise Yes    Bike Helmet Not Asked    Seat Belt Yes    Self-Exams Yes   Social History Narrative    Not on file       Family History   Problem Relation Age of Onset    Other Mother         Mental Illness    Cancer Father         CLL.     Diabetes Father     Hypertension Father        Allergies   Allergen Reactions    Latex Unable to Obtain    Other Food Anaphylaxis     Pitted Fruits.  [place] Anaphylaxis    Ciprofloxacin Unable to Obtain    Levaquin [Levofloxacin] Unable to Obtain    Morphine Unable to Obtain    Pcn [Penicillins] Unable to Obtain    Vancomycin Unable to Obtain       Current Outpatient Medications   Medication Sig    rosuvastatin (CRESTOR) 10 mg tablet TAKE 1 TABLET BY MOUTH DAILY    FLUoxetine (PROzac) 20 mg capsule Take 1 Cap by mouth daily.  OLANZapine (ZyPREXA) 5 mg tablet Take 1 Tab by mouth nightly.  spironolactone (ALDACTONE) 25 mg tablet TAKE 1 TABLET BY MOUTH DAILY    EPINEPHrine (EPIPEN) 0.3 mg/0.3 mL injection INJECT 0.3 ML INTO THE MUSCLE AS DIRECTED ONCE AS NEEDED FOR ALLERGIC RESPONSE. CALL HEALTHCARE PROVIDER OR EMERGENCY ROOM IMMEDIATELY    dexAMETHasone (DECADRON) 6 mg tablet Take one tablet on day 1, repeat dose on day 3 and day 5    clonazePAM (KlonoPIN) 0.5 mg tablet Take 1 Tab by mouth daily as needed (Anxiety).  glucose blood VI test strips (OneTouch Verio test strips) strip OneTouch Verio test strips    metFORMIN ER (GLUCOPHAGE XR) 500 mg tablet TAKE 1 TABLET BY MOUTH ONCE DAILY WITH DINNER    omeprazole (PRILOSEC) 20 mg capsule Take 20 mg by mouth. Indications: gastroesophageal reflux disease, heartburn    albuterol (ProAir HFA) 90 mcg/actuation inhaler Take 2 Puffs by inhalation every four (4) hours as needed for Wheezing or Shortness of Breath.  losartan (COZAAR) 100 mg tablet TK 1 T PO QD    hydroxychloroquine (Plaquenil) 200 mg tablet Take 200 mg by mouth two (2) times a day.  meloxicam (MOBIC) 7.5 mg tablet TK 1 T PO QD WITH FOOD    ONETOUCH VERIO strip as directed TEST 3 TIMES A WEEK    aspirin delayed-release 81 mg tablet Take  by mouth daily.     lancets (ONE TOUCH DELICA) 33 gauge misc OneTouch Delica Lancets 33 gauge    tiZANidine (ZANAFLEX) 6 mg capsule     cephALEXin (KEFLEX) 500 mg capsule TAKE 1 CAPSULE BY MOUTH THREE TIMES DAILY UNTIL FINISHED    guaiFENesin-dextromethorphan (TUSSI-ORGANIDIN DM)  mg/5 mL liqd Take 10 mL by mouth every four (4) hours as needed for Cough. No current facility-administered medications for this visit. The above histories, medications and allergies have been reviewed. Review of Systems   Constitutional: Positive for malaise/fatigue. Gastrointestinal: Positive for abdominal pain and diarrhea. Musculoskeletal: Positive for joint pain and myalgias. Psychiatric/Behavioral: Positive for depression. Visit Vitals  BP (!) 147/80 (BP 1 Location: Left upper arm, BP Patient Position: Sitting)   Pulse 92   Temp 98.2 °F (36.8 °C) (Temporal)   Resp 16   Wt 195 lb (88.5 kg)   SpO2 97%   BMI 35.67 kg/m²     Physical Exam  Constitutional:       Appearance: Normal appearance. Cardiovascular:      Rate and Rhythm: Normal rate and regular rhythm. Pulmonary:      Effort: Pulmonary effort is normal. No respiratory distress. Breath sounds: Normal breath sounds. Abdominal:      General: There is no distension. Palpations: Abdomen is soft. There is no mass. Tenderness: There is no abdominal tenderness. Neurological:      Mental Status: She is alert. 1. Upper abdominal pain  Atypical and unclear etiology of her abdominal pain. Recommend EGD. Risks of the procedure were shared with the patient including the risks of aspiration or esophageal or gastric perforation. All questions were answered to the patient's satisfaction. We will schedule. We also discussed the possibility of doing an upper GI however based on her atypical symptoms recommend starting with EGD. The patient was counseled at length about the risks of yelena Covid-19 during their perioperative period and any recovery window from their procedure.   The patient was made aware that yelena Covid-19  may worsen their prognosis for recovering from their procedure and lend to a higher morbidity and/or mortality risk. All material risks, benefits, and reasonable alternatives including postponing the procedure were discussed. The patient does  wish to proceed with the procedure at this time. Follow-up and Dispositions    · Return for post procedure.          Luis Armando Hidalgo MD

## 2021-04-19 ENCOUNTER — CLINICAL SUPPORT (OUTPATIENT)
Dept: FAMILY MEDICINE CLINIC | Age: 56
End: 2021-04-19
Payer: COMMERCIAL

## 2021-04-19 VITALS — TEMPERATURE: 97.5 F

## 2021-04-19 DIAGNOSIS — I10 ESSENTIAL HYPERTENSION: ICD-10-CM

## 2021-04-19 DIAGNOSIS — E11.8 CONTROLLED TYPE 2 DIABETES MELLITUS WITH COMPLICATION, WITHOUT LONG-TERM CURRENT USE OF INSULIN (HCC): ICD-10-CM

## 2021-04-19 DIAGNOSIS — M32.9 SYSTEMIC LUPUS ERYTHEMATOSUS, UNSPECIFIED SLE TYPE, UNSPECIFIED ORGAN INVOLVEMENT STATUS (HCC): ICD-10-CM

## 2021-04-19 DIAGNOSIS — E78.1 PURE HYPERGLYCERIDEMIA: ICD-10-CM

## 2021-04-19 DIAGNOSIS — Q89.01 ASPLENIA: ICD-10-CM

## 2021-04-19 PROCEDURE — 36415 COLL VENOUS BLD VENIPUNCTURE: CPT | Performed by: INTERNAL MEDICINE

## 2021-04-19 NOTE — PROGRESS NOTES
Learning Assessment 4/13/2020   PRIMARY LEARNER Patient   HIGHEST LEVEL OF EDUCATION - PRIMARY LEARNER  SOME COLLEGE   BARRIERS PRIMARY LEARNER NONE   CO-LEARNER CAREGIVER No   PRIMARY LANGUAGE ENGLISH   LEARNER PREFERENCE PRIMARY READING   ANSWERED BY Patient    RELATIONSHIP SELF         Pt coming in today for fasting blood work only. No complaints at this time. Blood was drawn from right arm.

## 2021-04-20 LAB
ALBUMIN SERPL-MCNC: 3.8 G/DL (ref 3.5–5)
ALBUMIN/GLOB SERPL: 1.1 {RATIO} (ref 1.1–2.2)
ALP SERPL-CCNC: 65 U/L (ref 45–117)
ALT SERPL-CCNC: 42 U/L (ref 12–78)
ANION GAP SERPL CALC-SCNC: 6 MMOL/L (ref 5–15)
AST SERPL-CCNC: 23 U/L (ref 15–37)
BASOPHILS # BLD: 0.1 K/UL (ref 0–0.1)
BASOPHILS NFR BLD: 1 % (ref 0–1)
BILIRUB SERPL-MCNC: 0.1 MG/DL (ref 0.2–1)
BUN SERPL-MCNC: 25 MG/DL (ref 6–20)
BUN/CREAT SERPL: 26 (ref 12–20)
CALCIUM SERPL-MCNC: 10 MG/DL (ref 8.5–10.1)
CHLORIDE SERPL-SCNC: 107 MMOL/L (ref 97–108)
CHOLEST SERPL-MCNC: 181 MG/DL
CO2 SERPL-SCNC: 25 MMOL/L (ref 21–32)
CREAT SERPL-MCNC: 0.96 MG/DL (ref 0.55–1.02)
DIFFERENTIAL METHOD BLD: ABNORMAL
EOSINOPHIL # BLD: 0.6 K/UL (ref 0–0.4)
EOSINOPHIL NFR BLD: 5 % (ref 0–7)
ERYTHROCYTE [DISTWIDTH] IN BLOOD BY AUTOMATED COUNT: 16.3 % (ref 11.5–14.5)
EST. AVERAGE GLUCOSE BLD GHB EST-MCNC: 137 MG/DL
GLOBULIN SER CALC-MCNC: 3.5 G/DL (ref 2–4)
GLUCOSE SERPL-MCNC: 102 MG/DL (ref 65–100)
HBA1C MFR BLD: 6.4 % (ref 4–5.6)
HCT VFR BLD AUTO: 43.7 % (ref 35–47)
HDLC SERPL-MCNC: 53 MG/DL
HDLC SERPL: 3.4 {RATIO} (ref 0–5)
HGB BLD-MCNC: 13.1 G/DL (ref 11.5–16)
IMM GRANULOCYTES # BLD AUTO: 0 K/UL (ref 0–0.04)
IMM GRANULOCYTES NFR BLD AUTO: 0 % (ref 0–0.5)
LDLC SERPL CALC-MCNC: 81.4 MG/DL (ref 0–100)
LIPID PROFILE,FLP: ABNORMAL
LYMPHOCYTES # BLD: 3.7 K/UL (ref 0.8–3.5)
LYMPHOCYTES NFR BLD: 33 % (ref 12–49)
MCH RBC QN AUTO: 30.2 PG (ref 26–34)
MCHC RBC AUTO-ENTMCNC: 30 G/DL (ref 30–36.5)
MCV RBC AUTO: 100.7 FL (ref 80–99)
MONOCYTES # BLD: 1.1 K/UL (ref 0–1)
MONOCYTES NFR BLD: 10 % (ref 5–13)
NEUTS SEG # BLD: 5.9 K/UL (ref 1.8–8)
NEUTS SEG NFR BLD: 51 % (ref 32–75)
NRBC # BLD: 0 K/UL (ref 0–0.01)
NRBC BLD-RTO: 0 PER 100 WBC
PLATELET # BLD AUTO: 447 K/UL (ref 150–400)
PMV BLD AUTO: 10.6 FL (ref 8.9–12.9)
POTASSIUM SERPL-SCNC: 4.2 MMOL/L (ref 3.5–5.1)
PROT SERPL-MCNC: 7.3 G/DL (ref 6.4–8.2)
RBC # BLD AUTO: 4.34 M/UL (ref 3.8–5.2)
SODIUM SERPL-SCNC: 138 MMOL/L (ref 136–145)
TRIGL SERPL-MCNC: 233 MG/DL (ref ?–150)
TSH SERPL DL<=0.05 MIU/L-ACNC: 5.67 UIU/ML (ref 0.36–3.74)
VLDLC SERPL CALC-MCNC: 46.6 MG/DL
WBC # BLD AUTO: 11.4 K/UL (ref 3.6–11)

## 2021-04-21 RX ORDER — LOSARTAN POTASSIUM 100 MG/1
TABLET ORAL
Qty: 90 TAB | Refills: 0 | Status: SHIPPED | OUTPATIENT
Start: 2021-04-21 | End: 2021-07-06 | Stop reason: SDUPTHER

## 2021-04-21 NOTE — TELEPHONE ENCOUNTER
----- Message from Jomar Anne sent at 4/21/2021  2:46 PM EDT -----  Regarding: Dr. Dilan Meneses  Medication Refill    Caller (if not patient): pt      Relationship of caller (if not patient): self      Best contact number(s): [phone]      Name of medication and dosage if known: Losartan      Is patient out of this medication (yes/no):  yes      Pharmacy name: [address] listed in chart? (yes/no): yes  Pharmacy phone number: [phone]      Details to clarify the request: pt stated her pharmacy been requesting for the last four week with no response.       Jomar Anne

## 2021-04-26 ENCOUNTER — HOSPITAL ENCOUNTER (OUTPATIENT)
Dept: LAB | Age: 56
Discharge: HOME | End: 2021-04-26
Payer: COMMERCIAL

## 2021-04-26 LAB
ALBUMIN SERPL-MCNC: 3.8 G/DL (ref 3.5–5)
ALBUMIN/GLOB SERPL: 1 {RATIO} (ref 1.1–2.2)
ALP SERPL-CCNC: 63 U/L (ref 45–117)
ALT SERPL-CCNC: 41 U/L (ref 12–78)
ANION GAP SERPL CALC-SCNC: 12 MMOL/L (ref 5–15)
AST SERPL-CCNC: 22 U/L (ref 15–37)
BASOPHILS # BLD: 0.1 K/UL (ref 0–0.1)
BASOPHILS NFR BLD: 1 % (ref 0–1)
BILIRUB SERPL-MCNC: 0.3 MG/DL (ref 0.2–1)
BUN SERPL-MCNC: 16 MG/DL (ref 6–20)
BUN/CREAT SERPL: 16 (ref 12–20)
CALCIUM SERPL-MCNC: 9.9 MG/DL (ref 8.5–10.1)
CHLORIDE SERPL-SCNC: 103 MMOL/L (ref 97–108)
CO2 SERPL-SCNC: 27 MMOL/L (ref 21–32)
CREAT SERPL-MCNC: 0.99 MG/DL (ref 0.55–1.02)
CRP SERPL-MCNC: 0.45 MG/DL (ref 0–0.6)
DIFFERENTIAL METHOD BLD: ABNORMAL
EOSINOPHIL # BLD: 0.4 K/UL (ref 0–0.4)
EOSINOPHIL NFR BLD: 5 % (ref 0–7)
ERYTHROCYTE [DISTWIDTH] IN BLOOD BY AUTOMATED COUNT: 15 % (ref 11.5–14.5)
ERYTHROCYTE [SEDIMENTATION RATE] IN BLOOD: 38 MM/HR (ref 0–30)
GLOBULIN SER CALC-MCNC: 4 G/DL (ref 2–4)
GLUCOSE SERPL-MCNC: 101 MG/DL (ref 65–100)
HCT VFR BLD AUTO: 40.5 % (ref 35–47)
HGB BLD-MCNC: 13.4 G/DL (ref 11.5–16)
IMM GRANULOCYTES # BLD AUTO: 0 K/UL (ref 0–0.04)
IMM GRANULOCYTES NFR BLD AUTO: 0 % (ref 0–0.5)
LYMPHOCYTES # BLD: 3.2 K/UL (ref 0.8–3.5)
LYMPHOCYTES NFR BLD: 35 % (ref 12–49)
MCH RBC QN AUTO: 30 PG (ref 26–34)
MCHC RBC AUTO-ENTMCNC: 33.1 G/DL (ref 30–36.5)
MCV RBC AUTO: 90.8 FL (ref 80–99)
MONOCYTES # BLD: 1 K/UL (ref 0–1)
MONOCYTES NFR BLD: 11 % (ref 5–13)
NEUTS SEG # BLD: 4.3 K/UL (ref 1.8–8)
NEUTS SEG NFR BLD: 48 % (ref 32–75)
NRBC # BLD: 0 K/UL (ref 0–0.01)
NRBC BLD-RTO: 0 PER 100 WBC
PLATELET # BLD AUTO: 421 K/UL (ref 150–400)
PMV BLD AUTO: 9.8 FL (ref 8.9–12.9)
POTASSIUM SERPL-SCNC: 4 MMOL/L (ref 3.5–5.1)
PROT SERPL-MCNC: 7.8 G/DL (ref 6.4–8.2)
RBC # BLD AUTO: 4.46 M/UL (ref 3.8–5.2)
SODIUM SERPL-SCNC: 142 MMOL/L (ref 136–145)
WBC # BLD AUTO: 9 K/UL (ref 3.6–11)

## 2021-04-26 PROCEDURE — 36415 COLL VENOUS BLD VENIPUNCTURE: CPT

## 2021-04-26 PROCEDURE — 86225 DNA ANTIBODY NATIVE: CPT

## 2021-04-26 PROCEDURE — 85652 RBC SED RATE AUTOMATED: CPT

## 2021-04-26 PROCEDURE — 86140 C-REACTIVE PROTEIN: CPT

## 2021-04-26 PROCEDURE — 80053 COMPREHEN METABOLIC PANEL: CPT

## 2021-04-26 PROCEDURE — 85025 COMPLETE CBC W/AUTO DIFF WBC: CPT

## 2021-04-26 NOTE — PROGRESS NOTES
Results and comments sent through my chart to patient. Your lab results do indicate that your thyroid number has been slowly creeping upwards; we will discuss at your appointment if you want to start on some levothyroxine or medication for helping out the thyroid. Hemoglobin A1c is 6.4 which you have been keeping in an excellent range keep up the good work! Cholesterol numbers look great-triglyceride is elevated being the only odd ball out and there is suggestions below that may help. You definitely were on the drier side can you had your lab work done, but there were no abnormalities of your potassium, calcium, liver enzymes, and your kidney function is good. There is a mild elevation of your platelets and your white blood cells, which may be transient and perhaps due to viral infection as your lymphocytes were mildly elevated as well. We can go over this further when you come in on the 28th! Hope you are doing well!

## 2021-04-27 LAB — DSDNA AB SER-ACNC: 1 IU/ML (ref 0–9)

## 2021-04-28 ENCOUNTER — ANESTHESIA EVENT (OUTPATIENT)
Dept: SURGERY | Age: 56
End: 2021-04-28
Payer: COMMERCIAL

## 2021-04-28 ENCOUNTER — OFFICE VISIT (OUTPATIENT)
Dept: FAMILY MEDICINE CLINIC | Age: 56
End: 2021-04-28
Payer: COMMERCIAL

## 2021-04-28 VITALS
DIASTOLIC BLOOD PRESSURE: 84 MMHG | SYSTOLIC BLOOD PRESSURE: 126 MMHG | TEMPERATURE: 97.3 F | RESPIRATION RATE: 20 BRPM | HEIGHT: 62 IN | HEART RATE: 76 BPM | BODY MASS INDEX: 37.36 KG/M2 | OXYGEN SATURATION: 98 % | WEIGHT: 203 LBS

## 2021-04-28 DIAGNOSIS — I10 ESSENTIAL HYPERTENSION: ICD-10-CM

## 2021-04-28 DIAGNOSIS — R10.9 STOMACH PAIN: ICD-10-CM

## 2021-04-28 DIAGNOSIS — Z76.89 ENCOUNTER FOR WEIGHT MANAGEMENT: ICD-10-CM

## 2021-04-28 DIAGNOSIS — Z23 ENCOUNTER FOR IMMUNIZATION: ICD-10-CM

## 2021-04-28 DIAGNOSIS — Q89.01 ASPLENIA: ICD-10-CM

## 2021-04-28 DIAGNOSIS — E03.9 ACQUIRED HYPOTHYROIDISM: Primary | ICD-10-CM

## 2021-04-28 PROCEDURE — 99213 OFFICE O/P EST LOW 20 MIN: CPT | Performed by: INTERNAL MEDICINE

## 2021-04-28 RX ORDER — LEVOTHYROXINE SODIUM 137 UG/1
TABLET ORAL
Qty: 30 TAB | Refills: 1 | Status: SHIPPED | OUTPATIENT
Start: 2021-04-28 | End: 2021-04-29

## 2021-04-28 RX ORDER — LEVOTHYROXINE SODIUM 125 UG/1
125 TABLET ORAL
Qty: 30 TAB | Refills: 1 | Status: CANCELLED | OUTPATIENT
Start: 2021-04-28

## 2021-04-28 NOTE — PROGRESS NOTES
Eve Yen is a 48 y.o. female who presents to the office today with the following:  Chief Complaint   Patient presents with    Hypertension     Patient presents today to follow up on the following    Stomach pain-will be having an EGD with Dr. Jerzy Winters are coming up on 11 May. She had been having a burning sensation in her stomach and pain, which she especially noted when she was eating. Denies nausea, vomiting, hematemesis. Denies melena or hematochezia. No unusual or unintentional weight loss. She is currently on omeprazole. She did stop her meloxicam, Which she noted has made some difference in her symptoms. She would also like to talk about weight loss management. She states that she has changed her diet and has been trying to get more exercise, but continues to gain weight and feel very fatigued/tired constantly. Of note, her thyroid levels were mildly elevated at the last lab check which may be a contributing factor. She would like to know what options may be available for weight loss. Hypertension: blood pressure is good at todays visit. She is currently on losartan 100 mg as well as Spironolactone 25 mg daily. She has been tolerating the medications without any side effects. Denies shortness of breath, difficulty breathing, chest pressure and tightness, headaches, PND, orthopnea. Denies palpitations. Also requesting a prescription to be reprinted for the meningitis vaccine that does not have latex. She states she has her old one at home but was unable to get it filled last year and doesnt know if it would still be honored. Allergies   Allergen Reactions    Latex Unable to Obtain    Other Food Anaphylaxis     Pitted Fruits.     [place] Anaphylaxis    Ciprofloxacin Unable to Obtain    Levaquin [Levofloxacin] Unable to Obtain    Morphine Unable to Obtain    Pcn [Penicillins] Unable to Obtain    Vancomycin Unable to Obtain       Current Outpatient Medications   Medication Sig    haemoph b poly conj-tet tox/PF (Hiberix, PF,) 10 mcg/0.5 mL injection 0.5 mL by IntraMUSCular route once for 1 dose.  losartan (COZAAR) 100 mg tablet TK 1 T PO QD    rosuvastatin (CRESTOR) 10 mg tablet TAKE 1 TABLET BY MOUTH DAILY    FLUoxetine (PROzac) 20 mg capsule Take 1 Cap by mouth daily.  spironolactone (ALDACTONE) 25 mg tablet TAKE 1 TABLET BY MOUTH DAILY    EPINEPHrine (EPIPEN) 0.3 mg/0.3 mL injection INJECT 0.3 ML INTO THE MUSCLE AS DIRECTED ONCE AS NEEDED FOR ALLERGIC RESPONSE. CALL HEALTHCARE PROVIDER OR EMERGENCY ROOM IMMEDIATELY    clonazePAM (KlonoPIN) 0.5 mg tablet Take 1 Tab by mouth daily as needed (Anxiety).  glucose blood VI test strips (OneTouch Verio test strips) strip OneTouch Verio test strips    metFORMIN ER (GLUCOPHAGE XR) 500 mg tablet TAKE 1 TABLET BY MOUTH ONCE DAILY WITH DINNER    albuterol (ProAir HFA) 90 mcg/actuation inhaler Take 2 Puffs by inhalation every four (4) hours as needed for Wheezing or Shortness of Breath.  hydroxychloroquine (Plaquenil) 200 mg tablet Take 200 mg by mouth two (2) times a day.  meloxicam (MOBIC) 7.5 mg tablet TK 1 T PO QD WITH FOOD    ONETOUCH VERIO strip as directed TEST 3 TIMES A WEEK    lancets (ONE TOUCH DELICA) 33 gauge misc OneTouch Delica Lancets 33 gauge    tiZANidine (ZANAFLEX) 6 mg capsule     famotidine (Pepcid) 40 mg tablet Take 40 mg by mouth daily.  phentermine-topiramate 3.75-23 mg CM24 Take 1 Cap by mouth daily. Max Daily Amount: 1 Cap.  phentermine-topiramate 7.5-46 mg CM24 Take 1 Cap by mouth daily. Max Daily Amount: 1 Cap. Indications: weight loss management for an obese person    OLANZapine (ZyPREXA) 2.5 mg tablet Take 1 Tab by mouth nightly.  levothyroxine (SYNTHROID) 137 mcg tablet TAKE 1 TABLET DAILY ONE HOUR PRIOR TO BREAKFAST OR 2 TO 3 HOURS AFTER DINNER(IMPORTANT TO TAKE ON EMPTY STOMACH FOR ABSORPTION)     No current facility-administered medications for this visit.         Past Medical History: Diagnosis Date    Depression     Diabetes ([facility] [address]     Fibromyalgia     Grade 2 ankle sprain 12/9/2018    Hypertension     Lupus (systemic lupus erythematosus) ([facility] [address]     Menopause        Past Surgical History:   Procedure Laterality Date    HX COLONOSCOPY  2017    HX CYST REMOVAL      pilondal cyst    HX DILATION AND CURETTAGE  2015    HX KNEE ARTHROSCOPY Left 2020    HX SPLENECTOMY         Social History     Socioeconomic History    Marital status:      Spouse name: Not on file    Number of children: Not on file    Years of education: Not on file    Highest education level: Not on file   Occupational History    Occupation: [employer]   Tobacco Use    Smoking status: Never Smoker    Smokeless tobacco: Never Used   Substance and Sexual Activity    Alcohol use: No     Frequency: Never     Binge frequency: Never    Drug use: No    Sexual activity: Yes   Lifestyle    Physical activity     Days per week: 3 days     Minutes per session: 30 min    Stress: Not at all   Other Topics Concern     Service No    Blood Transfusions No    Caffeine Concern No    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  Hobby Hazards No    Sleep Concern No    Stress Concern Yes    Weight Concern Yes    Special Diet Yes     Comment: Food Allergies & Diabetic    Back Care Yes    Exercise Yes    Seat Belt Yes    Self-Exams Yes       Social History     Tobacco Use   Smoking Status Never Smoker   Smokeless Tobacco Never Used       Family History   Problem Relation Age of Onset    Other Mother         Mental Illness    Cancer Father         CLL.     Diabetes Father     Hypertension Father        Vitals:    04/28/21 1558   BP: 126/84   BP 1 Location: Left upper arm   BP Patient Position: At rest   BP Cuff Size: Large adult   Pulse: 76   Resp: 20   Temp: 97.3 °F (36.3 °C)   TempSrc: Core   SpO2: 98%   Weight: 203 lb (92.1 kg)   Height: 5' 2\" (1.575 m)         3 most recent PHQ Screens 4/28/2021   Little interest or pleasure in doing things Not at all   Feeling down, depressed, irritable, or hopeless Not at all   Total Score PHQ 2 0       ROS:  Denies fever, chills, cough, chest pain or pressure, SOB/MAGALI,  nausea, vomiting, or diarrhea/constipation. No changes in vision or hearing. No new or worsening headaches. No edema, no claudication. Denies wt loss, wt gain, hemoptysis, hematochezia or melena. No dysuria, pyuria, frequency. No polydipsia, polyuria. No new weakness, arthralgias, myalgias. No weakness, dizziness, lightheadedness, numbness or tingling. No recent changes in moods. Physical Examination:    GENERAL APPEARANCE: NAD, appears stated age, comfortable  VITAL SIGNS:   Visit Vitals  /84 (BP 1 Location: Left upper arm, BP Patient Position: At rest, BP Cuff Size: Large adult)   Pulse 76   Temp 97.3 °F (36.3 °C) (Core)   Resp 20   Ht 5' 2\" (1.575 m)   Wt 203 lb (92.1 kg)   SpO2 98%   BMI 37.13 kg/m²     General Adult Exam  GENERAL APPEARANCE: Well developed, well nourished, alert and cooperative, and appears to be in no acute distress. HEAD: normocephalic. EYES: PERRL, EOMI. nonicteric, not injected   NECK: Neck supple, non-tender without lymphadenopathy, masses or thyromegaly. CARDIAC: Regular rate and rhythm, S1-S2 +, no r/m/g  LUNGS: Clear to auscultation without rales, rhonchi, wheezing or diminished breath sounds. ABDOMEN: Soft, nondistended, nontender. No guarding or rebound. EXTREMITIES: No significant deformity or joint abnormality. No edema. Peripheral pulses intact.  No calf pain to palpation  NEUROLOGICAL: CN II-XII grossly intact.  No focal neurological deficits  SKIN: Skin normal color, texture and turgor with no lesions or eruptions.   PSYCHIATRIC: Mood and affect congruent     ASSESSMENT AND PLAN:    Jaxon   Reprinted hibrex vaccine script for her to  - front office will call    Hypertension   Well controlled - continue with TLC's and salt reduction < 2 gm  No change in medications  Key CAD CHF Meds             losartan (COZAAR) 100 mg tablet (Taking) TK 1 T PO QD    rosuvastatin (CRESTOR) 10 mg tablet (Taking) TAKE 1 TABLET BY MOUTH DAILY    spironolactone (ALDACTONE) 25 mg tablet (Taking) TAKE 1 TABLET BY MOUTH DAILY    aspirin delayed-release 81 mg tablet (Taking/Discontinued) Take  by mouth daily. Stomach pain  Discontinued meloxicam.  On omeprazole and will be following up with EGD on may 11th. Encounter for weight management   List of medications that can be used for weight loss, along with the benefits and side effects of the medication, given to the patient. She will look through these, and we will discuss which 1 to implement. · Recommend avoiding etoh and sugary drinks, pushing water  · Limit saturated and trans fats, sodium, and added sugars   · Consume at least six servings of fruits and vegetables daily  · [place], broil, or bake your protein - sources of protein include Includes lean meats, poultry, fish, beans, eggs, and nuts   · Recommend exercising for at least 30 minutes 5-7 days per week  · Monitor weight  · Use Style[employer]Pal or [employer] This Much apps to monitor calories with a goal of reducing daily intake of calories by 500 calories (calorie deficit). Nutrition    · Eat a balanced diet made up mostly of vegetable, fruits, and lean proteins (meats and nuts), with smaller amounts of grains and dairy. The daily amounts below are based on a 2,000 calories per day; ask your doctor how many calories you should take in each day. · Vegetables - eat a variety of vegetables and add them to mixed dishes like casseroles, sandwiches, and wraps. Fresh, frozen, and canned count, too. Look for \"reduced sodium\" or \"no-salt-added\" on the label. Daily amount: at least 2.5 cups. · Fruits - focus on whole fruits and select 100% fruit juice when choosing juices.  Buy fruits that are fresh, frozen, dried, or canned, so that you can always have a supply on hand. Daily amount: 2 cups. · Protein - eat a variety of protein foods such as beans, soy, seafood, lean meats, poultry, and unsalted nuts and seeds. Select seafood twice a week. Choose lean cuts of meat and ground beef that is at least 95% lean. Daily amount: 5.5 ounces. · Grains - choose whole-grain versions of common foods such as bread, pasta, and tortillas. Not sure if it's whole grain? Check the ingredients list for the words \"whole\" or \"whole grain. \" Daily amount: 6 ounces. · Dairy - choose low-fat (1%) or fat-free (skim) dairy. Get the same amount of calcium and other nutrients as whole milk, but with less saturated fat and calories. Lactose intolerant? Try lactose-free milk or a fortified soy beverage. Daily amount: 3 cups. Orders Placed This Encounter    TSH 3RD GENERATION     Standing Status:   Future     Standing Expiration Date:   2022    T4, FREE     Standing Status:   Future     Standing Expiration Date:   2022    DISCONTD: levothyroxine (SYNTHROID) 137 mcg tablet     Sig: Daily one hour prior to breakfast or 2-3 hours after dinner (important to take on empty stomach for absorption)     Dispense:  30 Tab     Refill:  1    haemoph b poly conj-tet tox/PF (Hiberix, PF,) 10 mcg/0.5 mL injection     Si.5 mL by IntraMUSCular route once for 1 dose. Dispense:  0.5 mL     Refill:  0       Patient to return PRN for any new symptoms, call/come to clinic if notices any side effects from medication or any new side effects, and return for regularly scheduled visit    Patient verbalized understanding of and agreement to treatment plan. Patient has been advised to contact practice or seek care if condition persists or worsens. Uma Parada,       This documentation was facilitated by voice recognition software and may contain inadvertent typographical errors.   Please note that this dictation was completed with [employer]son, the computer voice recognition software. Quite often unanticipated grammatical, syntax, homophones, and other interpretive errors are inadvertently transcribed by the computer software. Please disregard these errors. Please excuse any errors that have escaped final proofreading.

## 2021-04-28 NOTE — PROGRESS NOTES
Learning Assessment 4/13/2020   PRIMARY LEARNER Patient   HIGHEST LEVEL OF EDUCATION - PRIMARY LEARNER  SOME COLLEGE   BARRIERS PRIMARY LEARNER NONE   CO-LEARNER CAREGIVER No   PRIMARY LANGUAGE ENGLISH   LEARNER PREFERENCE PRIMARY READING   ANSWERED BY Patient    RELATIONSHIP SELF       1. Have you been to the ER, urgent care clinic since your last visit? Hospitalized since your last visit? No    2. Have you seen or consulted any other health care providers outside of the [address] [place] [address] since your last visit? Include any pap smears or colon screening.  No

## 2021-04-28 NOTE — ANESTHESIA PREPROCEDURE EVALUATION
Anesthetic History   No history of anesthetic complications            Review of Systems / Medical History  Patient summary reviewed, nursing notes reviewed and pertinent labs reviewed    Pulmonary  Within defined limits                 Neuro/Psych         Psychiatric history (bipolar)     Cardiovascular    Hypertension          Hyperlipidemia         GI/Hepatic/Renal                Endo/Other    Diabetes    Obesity (BMI 36)     Other Findings   Comments: SLE           Physical Exam    Airway  Mallampati: II  TM Distance: > 6 cm  Neck ROM: normal range of motion   Mouth opening: Normal     Cardiovascular    Rhythm: regular  Rate: normal         Dental         Pulmonary  Breath sounds clear to auscultation               Abdominal  GI exam deferred       Other Findings            Anesthetic Plan    ASA: 2  Anesthesia type: MAC          Induction: Intravenous  Anesthetic plan and risks discussed with: Patient

## 2021-04-29 RX ORDER — LEVOTHYROXINE SODIUM 137 UG/1
TABLET ORAL
Qty: 90 TAB | Refills: 1 | Status: SHIPPED | OUTPATIENT
Start: 2021-04-29 | End: 2021-06-03 | Stop reason: SDUPTHER

## 2021-04-29 NOTE — PROGRESS NOTES
Assessment and plan: will follow up after the EGD to see what findings they may have before deciding on a medication for weight loss management. She is interested in the phentermine and Topamax combination but we consider the liraglutide. She will continue with her lifestyle changes. We will also add level thyroxine to see if this impacts her symptoms of fatigue, tiredness, and some of the changes that she has noted recently that have a company and being fatigued. Will start at 137 µg, Which is the closest to the 1.6 µg times kilogram calculation for initial dose. Continue with her and Antihypertensive medication at their current doses, Continue with lifestyle changes and Minimize salt intake to less than 2 g.

## 2021-04-30 ENCOUNTER — PATIENT MESSAGE (OUTPATIENT)
Dept: FAMILY MEDICINE CLINIC | Age: 56
End: 2021-04-30

## 2021-04-30 DIAGNOSIS — E66.01 SEVERE OBESITY (HCC): Primary | ICD-10-CM

## 2021-05-03 ENCOUNTER — TELEPHONE (OUTPATIENT)
Dept: PSYCHIATRY | Age: 56
End: 2021-05-03

## 2021-05-03 RX ORDER — OLANZAPINE 2.5 MG/1
2.5 TABLET ORAL
Qty: 30 TAB | Refills: 4 | Status: SHIPPED | OUTPATIENT
Start: 2021-05-03 | End: 2021-09-22 | Stop reason: SDUPTHER

## 2021-05-04 NOTE — TELEPHONE ENCOUNTER
From: Vinicius Schneider  To: Griffin Cisneros DO  Sent: 4/30/2021 10:47 PM EDT  Subject: Visit Follow-Up Question    Hello. I have thought about the options regarding the weight loss issue. I would like to try the phentermine-topiramate. I can give you my starting weight and update you weekly, biweekly or whenever you would want updates.    Thank you  Kaye Paula

## 2021-05-06 PROBLEM — Z76.89 ENCOUNTER FOR WEIGHT MANAGEMENT: Status: ACTIVE | Noted: 2021-05-06

## 2021-05-06 PROBLEM — R10.9 STOMACH PAIN: Status: ACTIVE | Noted: 2021-05-06

## 2021-05-06 PROBLEM — S93.409A GRADE 2 ANKLE SPRAIN: Status: RESOLVED | Noted: 2018-12-09 | Resolved: 2021-05-06

## 2021-05-06 RX ORDER — HAEMOPH B POLY CONJ-TET TOX/PF 10 MCG/0.5
0.5 VIAL (EA) INTRAMUSCULAR ONCE
Qty: 0.5 ML | Refills: 0 | Status: SHIPPED | OUTPATIENT
Start: 2021-05-06 | End: 2021-05-06

## 2021-05-06 RX ORDER — FAMOTIDINE 40 MG/1
40 TABLET, FILM COATED ORAL DAILY
COMMUNITY

## 2021-05-06 NOTE — PERIOP NOTES
[address] [place] [address]  SURGICAL PRE-ADMISSION INSTRUCTIONS    ARRIVAL  · You will be called the day before your surgery with your expected arrival time. · Sign in at the  of the hospital.  You will be directed to the Surgical Waiting Room. · Please arrive at your scheduled appointment time. You have been scheduled to arrive for your procedure one or two hours prior to the expected start time of your procedure. · Every effort will be made to minimize your wait but please be aware that unforeseen circumstances may affect our schedule. EATING  · DO NOT EAT OR DRINK ANYTHING AFTER MIDNIGHT ON THE EVENING BEFORE YOUR SURGERY OR ON THE DAY OF YOUR SURGERY except for your medications (as instructed) with a sip of water. · Do not use gum, mints or lozenges on the morning of your surgery. · Please do not smoke or chew tobacco before your surgery. MEDICATIONS   · Take the following medications on the morning of your surgery with the smallest amount of water possible : NONE    STOP THESE MEDICATIONS AT THE TIMES LISTED BELOW  NSAIDS (Indocin, Ibuprofen, Naprosyn) ;  7 days before     DRIVING/TRANSPORATION  · Have a responsible adult to drive you home from the hospital and to stay with you over night. Please have them plan to remain in the hospital during your surgery. Your surgery will not be done if you do not have a responsible adult to take you home and to stay with you. · If you have arranged for public transport, you must have a responsible adult to ride with you (who is not the ). · You may not drive for 24 hours after anesthesia. PREPARATION  · If you have a Living WiIl/Advance Directive, please bring a copy with you to scan into your chart. · Please DO NOT wear makeup or nail polish  · Please leave valuables at home,  DO NOT wear jewelry. · Wear loose, comfortable clothing that is large enough to cover a bulky dressing.     SPECIAL INSTRUCTIONS:  · NONE    Reviewed above preoperative instructions and answered questions by phone interview    Patient:  Kim Joselo   Date:     May 6, 2021  Time:   1:39 PM    RN:  Talita Ferris RN    Date:     May 6, 2021  Time:   1:39 PM

## 2021-05-06 NOTE — ASSESSMENT & PLAN NOTE
List of medications that can be used for weight loss, along with the benefits and side effects of the medication, given to the patient. She will look through these, and we will discuss which 1 to implement. · Recommend avoiding etoh and sugary drinks, pushing water  · Limit saturated and trans fats, sodium, and added sugars   · Consume at least six servings of fruits and vegetables daily  · [place], broil, or bake your protein - sources of protein include Includes lean meats, poultry, fish, beans, eggs, and nuts   · Recommend exercising for at least 30 minutes 5-7 days per week  · Monitor weight  · Use WhiteyboardPal or Eat This Much apps to monitor calories with a goal of reducing daily intake of calories by 500 calories (calorie deficit). Nutrition    · Eat a balanced diet made up mostly of vegetable, fruits, and lean proteins (meats and nuts), with smaller amounts of grains and dairy. The daily amounts below are based on a 2,000 calories per day; ask your doctor how many calories you should take in each day. · Vegetables - eat a variety of vegetables and add them to mixed dishes like casseroles, sandwiches, and wraps. Fresh, frozen, and canned count, too. Look for \"reduced sodium\" or \"no-salt-added\" on the label. Daily amount: at least 2.5 cups. · Fruits - focus on whole fruits and select 100% fruit juice when choosing juices. Buy fruits that are fresh, frozen, dried, or canned, so that you can always have a supply on hand. Daily amount: 2 cups. · Protein - eat a variety of protein foods such as beans, soy, seafood, lean meats, poultry, and unsalted nuts and seeds. Select seafood twice a week. Choose lean cuts of meat and ground beef that is at least 95% lean. Daily amount: 5.5 ounces. · Grains - choose whole-grain versions of common foods such as bread, pasta, and tortillas. Not sure if it's whole grain? Check the ingredients list for the words \"whole\" or \"whole grain. \" Daily amount: 6 ounces.   · Dairy - choose low-fat (1%) or fat-free (skim) dairy. Get the same amount of calcium and other nutrients as whole milk, but with less saturated fat and calories. Lactose intolerant? Try lactose-free milk or a fortified soy beverage. Daily amount: 3 cups.

## 2021-05-06 NOTE — ASSESSMENT & PLAN NOTE
Well controlled - continue with TLC's and salt reduction < 2 gm  No change in medications  Key CAD CHF Meds             losartan (COZAAR) 100 mg tablet (Taking) TK 1 T PO QD    rosuvastatin (CRESTOR) 10 mg tablet (Taking) TAKE 1 TABLET BY MOUTH DAILY    spironolactone (ALDACTONE) 25 mg tablet (Taking) TAKE 1 TABLET BY MOUTH DAILY    aspirin delayed-release 81 mg tablet (Taking/Discontinued) Take  by mouth daily.

## 2021-05-07 ENCOUNTER — PATIENT MESSAGE (OUTPATIENT)
Dept: FAMILY MEDICINE CLINIC | Age: 56
End: 2021-05-07

## 2021-05-07 ENCOUNTER — HOSPITAL ENCOUNTER (OUTPATIENT)
Dept: PREADMISSION TESTING | Age: 56
Discharge: HOME | End: 2021-05-07
Payer: COMMERCIAL

## 2021-05-07 DIAGNOSIS — U07.1 COVID-19: ICD-10-CM

## 2021-05-07 LAB — SARS-COV-2, COV2: NORMAL

## 2021-05-07 PROCEDURE — U0003 INFECTIOUS AGENT DETECTION BY NUCLEIC ACID (DNA OR RNA); SEVERE ACUTE RESPIRATORY SYNDROME CORONAVIRUS 2 (SARS-COV-2) (CORONAVIRUS DISEASE [COVID-19]), AMPLIFIED PROBE TECHNIQUE, MAKING USE OF HIGH THROUGHPUT TECHNOLOGIES AS DESCRIBED BY CMS-2020-01-R: HCPCS

## 2021-05-08 LAB — SARS-COV-2, COV2NT: NOT DETECTED

## 2021-05-10 NOTE — TELEPHONE ENCOUNTER
From: Tay Hidalgo  To: Patton Gaucher, DO  Sent: 5/7/2021 4:35 PM EDT  Subject: Prescription Question    Hello. Picked up prescription for the wgt loss. Two different strenghs. Lower dose for 14 days then the stronger dose after that?    Thank you  Christopher Gaffney

## 2021-05-10 NOTE — H&P
Dania Zavala is a 48 y.o. female who presents today with the following:  No chief complaint on file. HPI    60-year-old female who presents to us as a referral by Dr. Gail Stark for abdominal pain that has been present for approximately 8 months. She states most of this pain is toward the left upper quadrant. At that point she was having diarrhea and states that she was diagnosed with Campylobacter and treated with Flagyl. The symptoms have resolved in terms of the diarrhea but the pain in the left upper quadrant still persist.  It is not associated with nausea or vomiting or reflux symptoms. She states that typically as soon as she eats within a few seconds she will develop left upper quadrant abdominal pain with no radiation. Again there is no nausea or vomiting with this. It will typically last 10 to 15 minutes and then resolve. She had tried a course of a PPI with no real improvement and was told to discontinue this because this could affect her bone density and started on Pepcid 40 mg a day about a month ago. She states this has not resulted in significant improvement. She describes her pain as a crampy, burning pain. She has gained approximately 20 pounds over the last 8 months. She had a colonoscopy out 3 years ago and stated they found small polyps. About 8 months ago she started taking meloxicam but stopped it about a month and a half ago when she saw a rheumatologist and told her that she was having abdominal pain. She states she carries a diagnosis of fibromyalgia and lupus. 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 She does not drink or smoke.       Past Medical History:   Diagnosis Date    Depression     Diabetes ([address]     Fibromyalgia     Grade 2 ankle sprain 12/9/2018    Hypertension     Lupus (systemic lupus erythematosus) ([address]     Menopause        Past Surgical History:   Procedure Laterality Date    HX COLONOSCOPY  2017    HX CYST REMOVAL      pilondal cyst    HX DILATION AND CURETTAGE  2015    HX KNEE ARTHROSCOPY Left 2020    HX SPLENECTOMY         Social History     Socioeconomic History    Marital status:      Spouse name: Not on file    Number of children: Not on file    Years of education: Not on file    Highest education level: Not on file   Occupational History    Occupation: [employer]   Social Needs    Financial resource strain: Not on file    Food insecurity     Worry: Not on file     Inability: Not on file    Transportation needs     Medical: Not on file     Non-medical: Not on file   Tobacco Use    Smoking status: Never Smoker    Smokeless tobacco: Never Used   Substance and Sexual Activity    Alcohol use: No     Frequency: Never     Binge frequency: Never    Drug use: No    Sexual activity: Yes   Lifestyle    Physical activity     Days per week: 3 days     Minutes per session: 30 min    Stress: Not at all   Relationships    Social connections     Talks on phone: Not on file     Gets together: Not on file     Attends Sikh service: Not on file     Active member of club or organization: Not on file     Attends meetings of clubs or organizations: Not on file     Relationship status: Not on file    Intimate partner violence     Fear of current or ex partner: Not on file     Emotionally abused: Not on file     Physically abused: Not on file     Forced sexual activity: Not on file   Other Topics Concern     Service No    Blood Transfusions No    Caffeine Concern No    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     Hobby Hazards No    Sleep Concern No    Stress Concern Yes    Weight Concern Yes    Special Diet Yes     Comment: Food Allergies & Diabetic    Back Care Yes    Exercise Yes    Bike Helmet Not Asked    Seat Belt Yes    Self-Exams Yes   Social History Narrative    Not on file       Family History   Problem Relation Age of Onset    Other Mother         Mental Illness    Cancer Father         CLL.  Diabetes Father     Hypertension Father        Allergies   Allergen Reactions    Latex Unable to Obtain    Other Food Anaphylaxis     Pitted Fruits.  Gilmore Anaphylaxis    Ciprofloxacin Unable to Obtain    Levaquin [Levofloxacin] Unable to Obtain    Morphine Unable to Obtain    Pcn [Penicillins] Unable to Obtain    Vancomycin Unable to Obtain       No current facility-administered medications for this encounter. Current Outpatient Medications   Medication Sig    famotidine (Pepcid) 40 mg tablet Take 40 mg by mouth daily.  OLANZapine (ZyPREXA) 2.5 mg tablet Take 1 Tab by mouth nightly.  levothyroxine (SYNTHROID) 137 mcg tablet TAKE 1 TABLET DAILY ONE HOUR PRIOR TO BREAKFAST OR 2 TO 3 HOURS AFTER DINNER(IMPORTANT TO TAKE ON EMPTY STOMACH FOR ABSORPTION)    losartan (COZAAR) 100 mg tablet TK 1 T PO QD    rosuvastatin (CRESTOR) 10 mg tablet TAKE 1 TABLET BY MOUTH DAILY    FLUoxetine (PROzac) 20 mg capsule Take 1 Cap by mouth daily.  spironolactone (ALDACTONE) 25 mg tablet TAKE 1 TABLET BY MOUTH DAILY    EPINEPHrine (EPIPEN) 0.3 mg/0.3 mL injection INJECT 0.3 ML INTO THE MUSCLE AS DIRECTED ONCE AS NEEDED FOR ALLERGIC RESPONSE. CALL HEALTHCARE PROVIDER OR EMERGENCY ROOM IMMEDIATELY    clonazePAM (KlonoPIN) 0.5 mg tablet Take 1 Tab by mouth daily as needed (Anxiety).  glucose blood VI test strips (OneTouch Verio test strips) strip OneTouch Verio test strips    metFORMIN ER (GLUCOPHAGE XR) 500 mg tablet TAKE 1 TABLET BY MOUTH ONCE DAILY WITH DINNER    albuterol (ProAir HFA) 90 mcg/actuation inhaler Take 2 Puffs by inhalation every four (4) hours as needed for Wheezing or Shortness of Breath.  hydroxychloroquine (Plaquenil) 200 mg tablet Take 200 mg by mouth two (2) times a day.     meloxicam (MOBIC) 7.5 mg tablet TK 1 T PO QD WITH FOOD    ONETOUCH VERIO strip as directed TEST 3 TIMES A WEEK    lancets (ONE TOUCH DELICA) 33 gauge misc OneTouch Delica Lancets 33 gauge    tiZANidine (ZANAFLEX) 6 mg capsule     phentermine-topiramate 3.75-23 mg CM24 Take 1 Cap by mouth daily. Max Daily Amount: 1 Cap.  phentermine-topiramate 7.5-46 mg CM24 Take 1 Cap by mouth daily. Max Daily Amount: 1 Cap. Indications: weight loss management for an obese person       The above histories, medications and allergies have been reviewed. Review of Systems   Constitutional: Positive for malaise/fatigue. Gastrointestinal: Positive for abdominal pain and diarrhea. Musculoskeletal: Positive for joint pain and myalgias. Psychiatric/Behavioral: Positive for depression. There were no vitals taken for this visit. Physical Exam  Constitutional:       Appearance: Normal appearance. Cardiovascular:      Rate and Rhythm: Normal rate and regular rhythm. Pulmonary:      Effort: Pulmonary effort is normal. No respiratory distress. Breath sounds: Normal breath sounds. Abdominal:      General: There is no distension. Palpations: Abdomen is soft. There is no mass. Tenderness: There is no abdominal tenderness. Neurological:      Mental Status: She is alert. 1. Upper abdominal pain  Atypical and unclear etiology of her abdominal pain. Recommend EGD. Risks of the procedure were shared with the patient including the risks of aspiration or esophageal or gastric perforation. All questions were answered to the patient's satisfaction. We will schedule. We also discussed the possibility of doing an upper GI however based on her atypical symptoms recommend starting with EGD. The patient was counseled at length about the risks of yelena Covid-19 during their perioperative period and any recovery window from their procedure. The patient was made aware that yelena Covid-19  may worsen their prognosis for recovering from their procedure and lend to a higher morbidity and/or mortality risk.   All material risks, benefits, and reasonable alternatives including postponing the procedure were discussed. The patient does  wish to proceed with the procedure at this time.               Fay Izquierdo MD

## 2021-05-11 ENCOUNTER — ANESTHESIA (OUTPATIENT)
Dept: SURGERY | Age: 56
End: 2021-05-11
Payer: COMMERCIAL

## 2021-05-11 ENCOUNTER — HOSPITAL ENCOUNTER (OUTPATIENT)
Age: 56
Setting detail: OUTPATIENT SURGERY
Discharge: HOME | End: 2021-05-11
Attending: SURGERY | Admitting: SURGERY
Payer: COMMERCIAL

## 2021-05-11 VITALS
RESPIRATION RATE: 12 BRPM | TEMPERATURE: 97.4 F | OXYGEN SATURATION: 95 % | HEART RATE: 73 BPM | SYSTOLIC BLOOD PRESSURE: 138 MMHG | HEIGHT: 62 IN | WEIGHT: 203 LBS | BODY MASS INDEX: 37.36 KG/M2 | DIASTOLIC BLOOD PRESSURE: 82 MMHG

## 2021-05-11 DIAGNOSIS — R10.9 STOMACH PAIN: ICD-10-CM

## 2021-05-11 LAB
GLUCOSE BLD STRIP.AUTO-MCNC: 112 MG/DL (ref 65–117)
SERVICE CMNT-IMP: NORMAL

## 2021-05-11 PROCEDURE — 2709999900 HC NON-CHARGEABLE SUPPLY: Performed by: SURGERY

## 2021-05-11 PROCEDURE — 43239 EGD BIOPSY SINGLE/MULTIPLE: CPT | Performed by: SURGERY

## 2021-05-11 PROCEDURE — 88305 TISSUE EXAM BY PATHOLOGIST: CPT

## 2021-05-11 PROCEDURE — 76010000154 HC OR TIME FIRST 0.5 HR: Performed by: SURGERY

## 2021-05-11 PROCEDURE — 82962 GLUCOSE BLOOD TEST: CPT

## 2021-05-11 PROCEDURE — 74011250636 HC RX REV CODE- 250/636: Performed by: SURGERY

## 2021-05-11 PROCEDURE — 74011000250 HC RX REV CODE- 250: Performed by: ANESTHESIOLOGY

## 2021-05-11 PROCEDURE — 74011250636 HC RX REV CODE- 250/636: Performed by: ANESTHESIOLOGY

## 2021-05-11 PROCEDURE — 76060000031 HC ANESTHESIA FIRST 0.5 HR: Performed by: SURGERY

## 2021-05-11 PROCEDURE — 76210000063 HC OR PH I REC FIRST 0.5 HR: Performed by: SURGERY

## 2021-05-11 PROCEDURE — 77030037006 HC FCPS BIOP ENDOSC DISP OCOA -A: Performed by: SURGERY

## 2021-05-11 RX ORDER — SODIUM CHLORIDE, SODIUM LACTATE, POTASSIUM CHLORIDE, CALCIUM CHLORIDE 600; 310; 30; 20 MG/100ML; MG/100ML; MG/100ML; MG/100ML
125 INJECTION, SOLUTION INTRAVENOUS CONTINUOUS
Status: DISCONTINUED | OUTPATIENT
Start: 2021-05-11 | End: 2021-05-11 | Stop reason: HOSPADM

## 2021-05-11 RX ORDER — SODIUM CHLORIDE 0.9 % (FLUSH) 0.9 %
5-40 SYRINGE (ML) INJECTION EVERY 8 HOURS
Status: DISCONTINUED | OUTPATIENT
Start: 2021-05-11 | End: 2021-05-11 | Stop reason: HOSPADM

## 2021-05-11 RX ORDER — PROPOFOL 10 MG/ML
INJECTION, EMULSION INTRAVENOUS AS NEEDED
Status: DISCONTINUED | OUTPATIENT
Start: 2021-05-11 | End: 2021-05-11 | Stop reason: HOSPADM

## 2021-05-11 RX ORDER — MIDAZOLAM HYDROCHLORIDE 1 MG/ML
INJECTION, SOLUTION INTRAMUSCULAR; INTRAVENOUS AS NEEDED
Status: DISCONTINUED | OUTPATIENT
Start: 2021-05-11 | End: 2021-05-11 | Stop reason: HOSPADM

## 2021-05-11 RX ORDER — SODIUM CHLORIDE 0.9 % (FLUSH) 0.9 %
5-40 SYRINGE (ML) INJECTION AS NEEDED
Status: DISCONTINUED | OUTPATIENT
Start: 2021-05-11 | End: 2021-05-11 | Stop reason: HOSPADM

## 2021-05-11 RX ADMIN — PROPOFOL 30 MG: 10 INJECTION, EMULSION INTRAVENOUS at 09:20

## 2021-05-11 RX ADMIN — PROPOFOL 50 MG: 10 INJECTION, EMULSION INTRAVENOUS at 09:17

## 2021-05-11 RX ADMIN — PROPOFOL 30 MG: 10 INJECTION, EMULSION INTRAVENOUS at 09:22

## 2021-05-11 RX ADMIN — SODIUM CHLORIDE, POTASSIUM CHLORIDE, SODIUM LACTATE AND CALCIUM CHLORIDE 125 ML/HR: 600; 310; 30; 20 INJECTION, SOLUTION INTRAVENOUS at 08:58

## 2021-05-11 RX ADMIN — BENZOCAINE, BUTAMBEN, AND TETRACAINE HYDROCHLORIDE 1 SPRAY: .028; .004; .004 AEROSOL, SPRAY TOPICAL at 09:16

## 2021-05-11 RX ADMIN — PROPOFOL 50 MG: 10 INJECTION, EMULSION INTRAVENOUS at 09:14

## 2021-05-11 RX ADMIN — MIDAZOLAM 4 MG: 1 INJECTION INTRAMUSCULAR; INTRAVENOUS at 09:14

## 2021-05-11 NOTE — DISCHARGE INSTRUCTIONS
Patient Education        Upper GI Endoscopy: What to Expect at [address] had an upper GI endoscopy. Your doctor used a thin, lighted tube that bends to look at the inside of your esophagus, your stomach, and the first part of the small intestine, called the duodenum. After you have an endoscopy, you will stay at the hospital or clinic for 1 to 2 hours. This will allow the medicine to wear off. You will be able to go home after your doctor or nurse checks to make sure that you're not having any problems. You may have to stay overnight if you had treatment during the test. You may have a sore throat for a day or two after the test.  This care sheet gives you a general idea about what to expect after the test.  How can you care for yourself at home? Activity   · Rest as much as you need to after you go home. · You should be able to go back to your usual activities the day after the test.  Diet   · Follow your doctor's directions for eating after the test.  · Drink plenty of fluids (unless your doctor has told you not to). Medications   · If you have a sore throat the day after the test, use an over-the-counter spray to numb your throat. Follow-up care is a key part of your treatment and safety. Be sure to make and go to all appointments, and call your doctor if you are having problems. It's also a good idea to know your test results and keep a list of the medicines you take. When should you call for help? Call 911 anytime you think you may need emergency care. For example, call if:    · You passed out (lost consciousness).     · You have trouble breathing.     · You pass maroon or bloody stools.    Call your doctor now or seek immediate medical care if:    · You have pain that does not get better after your take pain medicine.     · You have new or worse belly pain.     · You have blood in your stools.     · You are sick to your stomach and cannot keep fluids down.     · You have a fever.     · You cannot pass stools or gas. Watch closely for changes in your health, and be sure to contact your doctor if:    · Your throat still hurts after a day or two.     · You do not get better as expected. Where can you learn more? Go to http://www.tuttle.com/  Enter J454 in the search box to learn more about \"Upper GI Endoscopy: What to Expect at Home. \"  Current as of: April 15, 2020               Content Version: 12.8  © 2006-2021 [employer]. Care instructions adapted under license by [employer] (which disclaims liability or warranty for this information). If you have questions about a medical condition or this instruction, always ask your healthcare professional. [address] any warranty or liability for your use of this information.

## 2021-05-11 NOTE — ANESTHESIA POSTPROCEDURE EVALUATION
Procedure(s):  ESOPHAGOGASTRODUODENOSCOPY (EGD)   DIABETIC. MAC    Anesthesia Post Evaluation      Multimodal analgesia: multimodal analgesia not used between 6 hours prior to anesthesia start to PACU discharge  Patient location during evaluation: bedside  Patient participation: complete - patient participated  Level of consciousness: awake and alert  Pain score: 0  Pain management: adequate  Airway patency: patent  Anesthetic complications: no  Cardiovascular status: acceptable  Respiratory status: acceptable  Hydration status: acceptable  Post anesthesia nausea and vomiting:  none  Final Post Anesthesia Temperature Assessment:  Normothermia (36.0-37.5 degrees C)      INITIAL Post-op Vital signs:   Vitals Value Taken Time   /82 05/11/21 0940   Temp 36.3 °C (97.4 °F) 05/11/21 0929   Pulse 78 05/11/21 0941   Resp 12 05/11/21 0941   SpO2 95 % 05/11/21 0941   Vitals shown include unvalidated device data.

## 2021-05-11 NOTE — BRIEF OP NOTE
Brief Postoperative Note    Patient: Andrews Bowles  YOB: 1965  MRN: [id number]    Date of Procedure: 5/11/2021     Pre-Op Diagnosis: upper abd. pain    Post-Op Diagnosis: Same as preoperative diagnosis. Procedure(s):  ESOPHAGOGASTRODUODENOSCOPY (EGD)   DIABETIC    Surgeon(s):  Justin Ceron MD    Surgical Assistant: Surg Asst-1: Ivan Richardson    Anesthesia: MAC     Estimated Blood Loss (mL): Minimal    Complications: None    Specimens:   ID Type Source Tests Collected by Time Destination   1 : duodenal biopsy Preservative Duodenum  Justin Ceron MD 5/11/2021 7162 Pathology   2 : antral biopsy Preservative Stomach, Antrum  Justin Ceron MD 5/11/2021 [id number] Pathology        Implants: * No implants in log *    Drains: * No LDAs found *    Findings: 1. Large hiatal hernia 2.  Mild gastritis     Electronically Signed by Moose Huber MD on 5/11/2021 at 9:28 AM

## 2021-05-11 NOTE — INTERVAL H&P NOTE
Update History & Physical 
 
The Patient's History and Physical of May 10,  
2021 was reviewed with the patient and I examined the patient. There was no change. The surgical site was confirmed by the patient and me. Plan:  The risk, benefits, expected outcome, and alternative to the recommended procedure have been discussed with the patient. Patient understands and wants to proceed with the procedure.  
 
Electronically signed by [place] Agudelo MD on 5/11/2021 at 9:03 AM

## 2021-05-11 NOTE — OP NOTES
[facility]  OPERATIVE REPORT    Name:  Trish Funk  MR#:  [id number]  :  1965  ACCOUNT #:  [de-identified]  DATE OF SERVICE:  2021    PREOPERATIVE DIAGNOSIS:  Upper abdominal pain. POSTOPERATIVE DIAGNOSIS:  Upper abdominal pain. PROCEDURE PERFORMED:  EGD with biopsy. SURGEON:  Anabella Medina MD    ASSISTANT:  None    ANESTHESIA:  MAC.    COMPLICATIONS:  None    SPECIMENS REMOVED:  1. Duodenal biopsy. 2.  Antral biopsy. IMPLANTS:  None    ESTIMATED BLOOD LOSS:  Minimal.    FINDINGS:  1.  Large hiatal hernia. 2.  Mild gastritis. DISPOSITION:  Stable. PROCEDURE:  The patient was brought to the operative theater. Monitoring devices and nasal cannula O2 were placed per Anesthesia. The posterior pharynx was sprayed by Anesthesia with Cetacaine spray, and the patient was placed in the left side down decubitus position with a bite block in place. IV sedation was administered and a time-out was performed. A well-lubricated Olympus gastroscope was introduced through the bite block down the posterior pharynx through the EG junction and into the stomach. The stomach was insufflated with air, and the pylorus was identified and intubated. The second and third portion of duodenum appeared to be normal, although there was a whitish coating of the duodenum. Representative biopsy was obtained. This did not appear to be pathologic. The scope was then pulled back. The antral region appeared to be relatively normal.  Representative biopsy was obtained. The gastric mucosa was circumferentially evaluated. There appeared to be very mild gastritis with no signs of bleeding. There were no ulcers or masses seen. The scope was then retroflexed. There was a fairly large hiatal hernia. The scope was straightened out and pulled back through the hiatal hernia. The hiatal hernia measured at least 3-4 cm. The EG junction was distinct at 33 cm.   The stomach was desufflated, and the scope was pulled back through the length of the esophagus without any additional abnormalities seen. The patient tolerated the procedure well and was transferred to PACU in stable condition.       Kandy Ivan MD      [id number]  D:  05/11/2021 9:32  T:  05/11/2021 14:01  JOB #:  [id number]  CC:  Patton Gaucher, DO

## 2021-05-14 ENCOUNTER — TELEPHONE (OUTPATIENT)
Dept: FAMILY MEDICINE CLINIC | Age: 56
End: 2021-05-14

## 2021-05-14 RX ORDER — SPIRONOLACTONE 25 MG/1
50 TABLET ORAL DAILY
Qty: 180 TAB | Refills: 1 | Status: SHIPPED | OUTPATIENT
Start: 2021-05-14 | End: 2022-02-28 | Stop reason: SDUPTHER

## 2021-05-14 NOTE — TELEPHONE ENCOUNTER
She can double the spironolactone - will send in another script with new directions - and continue to monitor BP at home.   If remains >130/90, she should let us know and we may have to augment with another type of anti hypertensive

## 2021-05-14 NOTE — TELEPHONE ENCOUNTER
Patient says her BP was elevated at a specialist's office and wonders if she needs to up her BP medication.   [address]

## 2021-05-14 NOTE — TELEPHONE ENCOUNTER
This note was created on the wrong patient. Called pharmacy to cancel new spironolactone prescription. New note created for correct patient and sent to Dr. Sarmad Chawla to address.  KT

## 2021-05-19 ENCOUNTER — OFFICE VISIT (OUTPATIENT)
Dept: SURGERY | Age: 56
End: 2021-05-19
Payer: COMMERCIAL

## 2021-05-19 VITALS
WEIGHT: 203 LBS | SYSTOLIC BLOOD PRESSURE: 135 MMHG | DIASTOLIC BLOOD PRESSURE: 72 MMHG | HEIGHT: 62 IN | BODY MASS INDEX: 37.36 KG/M2 | HEART RATE: 97 BPM

## 2021-05-19 DIAGNOSIS — K44.9 HIATAL HERNIA: Primary | ICD-10-CM

## 2021-05-19 PROCEDURE — 99213 OFFICE O/P EST LOW 20 MIN: CPT | Performed by: SURGERY

## 2021-05-19 NOTE — PROGRESS NOTES
1. Have you been to the ER, urgent care clinic since your last visit? Hospitalized since your last visit? No    2. Have you seen or consulted any other health care providers outside of the [address] [place] [address] since your last visit? Include any pap smears or colon screening.  No

## 2021-05-19 NOTE — LETTER
5/19/2021 Patient: Yani Villalta YOB: 1965 Date of Visit: 5/19/2021 Gabriela Griffith DO 
[address] [address] [address] [employer] Dear Gabriela Griffith DO, Thank you for referring Ms. Serafin Mendieta to [address] [place] [address] for evaluation. My notes for this consultation are attached. If you have questions, please do not hesitate to call me. I look forward to following your patient along with you.  
 
 
Sincerely, 
 
Brooke Burrell MD

## 2021-05-19 NOTE — PROGRESS NOTES
[address] [facility]      Clinic Note - Follow up    Subjective     Dania Zavala returns for scheduled follow up today. She is status post EGD that was performed back on May 11. She has been doing well. Her pain has resolved. Findings at the time of EGD were shared with her which were essentially that of a hiatal hernia. Her biopsies were all benign. She did have some very mild gastritis but no evidence of erosions or ulcerations. She has been off the meloxicam and taking Pepcid 40 mg at night. Objective     Visit Vitals  /72 (BP 1 Location: Left upper arm, BP Patient Position: Sitting)   Pulse 97   Ht 5' 2\" (1.575 m)   Wt 203 lb (92.1 kg)   BMI 37.13 kg/m²         PE  GEN - Awake, alert, communicating appropriately. NAD      Assessment     Dania Zavala is a 48 y. o.yr old female status post EGD with findings of a hiatal hernia. Her symptoms have improved. Plan     I have recommended that she stay off the meloxicam if at all possible. She also requested that if she could decrease her dosage of Pepcid which I think is reasonable since there were no significant acute findings at the time of EGD. She will take 20 mg of Pepcid nightly. I have told her if her abdominal pain recurs she can call us and we would happy to see her again.     Keaton Barth MD    CC: Dr. Gail Stark

## 2021-05-19 NOTE — PATIENT INSTRUCTIONS
Hiatal Hernia: Care Instructions Your Care Instructions A hiatal hernia occurs when part of the stomach bulges into the chest cavity. A hiatal hernia may allow stomach acid and juices to back up into the esophagus (acid reflux). This can cause a feeling of burning, warmth, heat, or pain behind the breastbone. This feeling may often occur after you eat, soon after you lie down, or when you bend forward, and it may come and go. You also may have a sour taste in your mouth. These symptoms are commonly known as heartburn or reflux. But not all hiatal hernias cause symptoms. Follow-up care is a key part of your treatment and safety. Be sure to make and go to all appointments, and call your doctor if you are having problems. It's also a good idea to know your test results and keep a list of the medicines you take. How can you care for yourself at home? · Take your medicines exactly as prescribed. Call your doctor if you think you are having a problem with your medicine. · Do not take aspirin or other nonsteroidal anti-inflammatory drugs (NSAIDs), such as ibuprofen (Advil, Motrin) or naproxen (Aleve), unless your doctor says it is okay. Ask your doctor what you can take for pain. · Your doctor may recommend over-the-counter medicine. For mild or occasional indigestion, antacids such as Tums, Gaviscon, Maalox, or Mylanta may help. Your doctor also may recommend over-the-counter acid reducers, such as famotidine (Pepcid AC), cimetidine (Tagamet HB), or omeprazole (Prilosec). Read and follow all instructions on the label. If you use these medicines often, talk with your doctor. · Change your eating habits. ? It's best to eat several small meals instead of two or three large meals. ? After you eat, wait 2 to 3 hours before you lie down. Late-night snacks aren't a good idea. ? Chocolate, mint, and alcohol can make heartburn worse. They relax the valve between the esophagus and the stomach. ?  Spicy foods, foods that have a lot of acid (like tomatoes and oranges), and coffee can make heartburn symptoms worse in some people. If your symptoms are worse after you eat a certain food, you may want to stop eating that food to see if your symptoms get better. · Do not smoke or chew tobacco. 
· If you get heartburn at night, raise the head of your bed 6 to 8 inches by putting the frame on blocks or placing a foam wedge under the head of your mattress. (Adding extra pillows does not work.) · Do not wear tight clothing around your middle. · Lose weight if you need to. Losing just 5 to 10 pounds can help. When should you call for help? Call your doctor now or seek immediate medical care if: 
  · You have new or worse belly pain.  
  · You are vomiting. Watch closely for changes in your health, and be sure to contact your doctor if: 
  · You have new or worse symptoms of indigestion.  
  · You have trouble or pain swallowing.  
  · You are losing weight.  
  · You do not get better as expected. Where can you learn more? Go to http://www.gray.com/ Enter N429 in the search box to learn more about \"Hiatal Hernia: Care Instructions. \" Current as of: April 15, 2020               Content Version: 12.8 © 8279-8708 [employer]. Care instructions adapted under license by "[employer]" (which disclaims liability or warranty for this information). If you have questions about a medical condition or this instruction, always ask your healthcare professional. [address] [address] any warranty or liability for your use of this information.

## 2021-05-28 ENCOUNTER — PATIENT MESSAGE (OUTPATIENT)
Dept: FAMILY MEDICINE CLINIC | Age: 56
End: 2021-05-28

## 2021-06-02 ENCOUNTER — CLINICAL SUPPORT (OUTPATIENT)
Dept: FAMILY MEDICINE CLINIC | Age: 56
End: 2021-06-02
Payer: COMMERCIAL

## 2021-06-02 VITALS — TEMPERATURE: 98.2 F

## 2021-06-02 DIAGNOSIS — E03.9 ACQUIRED HYPOTHYROIDISM: Primary | ICD-10-CM

## 2021-06-02 PROCEDURE — 36415 COLL VENOUS BLD VENIPUNCTURE: CPT | Performed by: NURSE PRACTITIONER

## 2021-06-02 NOTE — PROGRESS NOTES
Patient in for lab work via left arm venipuncture for TSH, T3 and CBC, tolerated OK. 1 lavender and 1 gold tube was sent.

## 2021-06-03 LAB
BASOPHILS # BLD: 0.1 K/UL (ref 0–0.1)
BASOPHILS NFR BLD: 1 % (ref 0–1)
DIFFERENTIAL METHOD BLD: ABNORMAL
EOSINOPHIL # BLD: 0.4 K/UL (ref 0–0.4)
EOSINOPHIL NFR BLD: 5 % (ref 0–7)
ERYTHROCYTE [DISTWIDTH] IN BLOOD BY AUTOMATED COUNT: 14.1 % (ref 11.5–14.5)
HCT VFR BLD AUTO: 40.9 % (ref 35–47)
HGB BLD-MCNC: 13.1 G/DL (ref 11.5–16)
IMM GRANULOCYTES # BLD AUTO: 0 K/UL (ref 0–0.04)
IMM GRANULOCYTES NFR BLD AUTO: 0 % (ref 0–0.5)
LYMPHOCYTES # BLD: 2.6 K/UL (ref 0.8–3.5)
LYMPHOCYTES NFR BLD: 33 % (ref 12–49)
MCH RBC QN AUTO: 30 PG (ref 26–34)
MCHC RBC AUTO-ENTMCNC: 32 G/DL (ref 30–36.5)
MCV RBC AUTO: 93.6 FL (ref 80–99)
MONOCYTES # BLD: 1 K/UL (ref 0–1)
MONOCYTES NFR BLD: 13 % (ref 5–13)
NEUTS SEG # BLD: 3.9 K/UL (ref 1.8–8)
NEUTS SEG NFR BLD: 48 % (ref 32–75)
NRBC # BLD: 0 K/UL (ref 0–0.01)
NRBC BLD-RTO: 0 PER 100 WBC
PLATELET # BLD AUTO: 421 K/UL (ref 150–400)
PMV BLD AUTO: 11.1 FL (ref 8.9–12.9)
RBC # BLD AUTO: 4.37 M/UL (ref 3.8–5.2)
T4 FREE SERPL-MCNC: 1.8 NG/DL (ref 0.8–1.5)
TSH SERPL DL<=0.05 MIU/L-ACNC: 0.03 UIU/ML (ref 0.36–3.74)
WBC # BLD AUTO: 7.9 K/UL (ref 3.6–11)

## 2021-06-03 RX ORDER — LEVOTHYROXINE SODIUM 125 UG/1
TABLET ORAL
Qty: 90 TABLET | Refills: 1 | Status: SHIPPED | OUTPATIENT
Start: 2021-06-03 | End: 2021-07-16 | Stop reason: SDUPTHER

## 2021-06-04 NOTE — PROGRESS NOTES
CBC essentially normal .  Elevated platelets  (thrombocytosis) no change . TSh  low decrease levothyroxine to 125 mcg and recheck TSH   6 weeks.  RX sent to pharmacy

## 2021-07-06 DIAGNOSIS — F31.81 BIPOLAR II DISORDER (HCC): ICD-10-CM

## 2021-07-06 RX ORDER — LOSARTAN POTASSIUM 100 MG/1
TABLET ORAL
Qty: 90 TABLET | Refills: 0 | Status: SHIPPED | OUTPATIENT
Start: 2021-07-06 | End: 2021-10-01

## 2021-07-06 RX ORDER — CLONAZEPAM 0.5 MG/1
0.5 TABLET ORAL
Qty: 30 TABLET | Refills: 2 | Status: SHIPPED | OUTPATIENT
Start: 2021-07-06 | End: 2021-09-22 | Stop reason: SDUPTHER

## 2021-07-06 NOTE — TELEPHONE ENCOUNTER
This patient called and stated that her pharmacy is waiting on the approval of her Losartan medication refill.      She stated they received the first medication refill but have not received the Losartan refill    Thank you

## 2021-07-14 ENCOUNTER — CLINICAL SUPPORT (OUTPATIENT)
Dept: FAMILY MEDICINE CLINIC | Age: 56
End: 2021-07-14
Payer: COMMERCIAL

## 2021-07-14 VITALS — TEMPERATURE: 98.6 F

## 2021-07-14 DIAGNOSIS — E07.9 THYROID DISEASE: Primary | ICD-10-CM

## 2021-07-14 PROCEDURE — 36415 COLL VENOUS BLD VENIPUNCTURE: CPT | Performed by: NURSE PRACTITIONER

## 2021-07-15 LAB — TSH SERPL DL<=0.05 MIU/L-ACNC: 0.01 UIU/ML (ref 0.36–3.74)

## 2021-07-16 RX ORDER — LEVOTHYROXINE SODIUM 100 UG/1
TABLET ORAL
Qty: 90 TABLET | Refills: 1 | Status: SHIPPED | OUTPATIENT
Start: 2021-07-16 | End: 2022-01-28

## 2021-07-16 NOTE — PROGRESS NOTES
I sent a message to the patient directly to try to get an appointment today with a provider if possible . If she is unable to go to the ER because she is in so much pain. Please ask her to decrease synthroid to 100 mcg and set up an appointment next week with me  for referrals to endocrine and rheumatology. Which pharmacy does she use for the rx of levothyroxine?

## 2021-07-16 NOTE — PROGRESS NOTES
Advised pt of results. She is est with [facility] in [facility] she is trying to get an appt with them. She asked me to fax results to their office. I have done so. Pt would like rx sent to [facility] in [place].  MICHAEL

## 2021-08-02 ENCOUNTER — HOSPITAL ENCOUNTER (OUTPATIENT)
Dept: GENERAL RADIOLOGY | Age: 56
Discharge: HOME | End: 2021-08-02
Payer: COMMERCIAL

## 2021-08-02 ENCOUNTER — TRANSCRIBE ORDER (OUTPATIENT)
Dept: REGISTRATION | Age: 56
End: 2021-08-02

## 2021-08-02 DIAGNOSIS — M25.561 RIGHT KNEE PAIN: Primary | ICD-10-CM

## 2021-08-02 DIAGNOSIS — M25.561 RIGHT KNEE PAIN: ICD-10-CM

## 2021-08-02 PROCEDURE — 73562 X-RAY EXAM OF KNEE 3: CPT

## 2021-09-22 ENCOUNTER — HOSPITAL ENCOUNTER (OUTPATIENT)
Dept: BEHAVIORAL/MENTAL HEALTH | Age: 56
Discharge: HOME | End: 2021-09-22
Payer: COMMERCIAL

## 2021-09-22 DIAGNOSIS — F31.81 BIPOLAR II DISORDER (HCC): ICD-10-CM

## 2021-09-22 PROCEDURE — 99442 PR PHYS/QHP TELEPHONE EVALUATION 11-20 MIN: CPT | Performed by: PSYCHIATRY & NEUROLOGY

## 2021-09-22 RX ORDER — CLONAZEPAM 0.5 MG/1
0.5 TABLET ORAL
Qty: 30 TABLET | Refills: 5 | Status: SHIPPED | OUTPATIENT
Start: 2021-09-22 | End: 2022-02-28 | Stop reason: SDUPTHER

## 2021-09-22 RX ORDER — FLUOXETINE HYDROCHLORIDE 20 MG/1
20 CAPSULE ORAL DAILY
Qty: 30 CAPSULE | Refills: 5 | Status: SHIPPED | OUTPATIENT
Start: 2021-09-22 | End: 2022-02-28 | Stop reason: SDUPTHER

## 2021-09-22 RX ORDER — OLANZAPINE 2.5 MG/1
2.5 TABLET ORAL
Qty: 30 TABLET | Refills: 5 | Status: SHIPPED | OUTPATIENT
Start: 2021-09-22 | End: 2022-02-28 | Stop reason: SDUPTHER

## 2021-09-22 NOTE — PROGRESS NOTES
Consent: The patient and/or their healthcare decision maker is aware that they may receive a bill for this audio only encounter, depending on their insurance coverage, and has provided verbal consent to proceed: Yes.     INTERVAL HISTORY (Audio Only): Mrs. Ferrer is a 81-year-old  white female who is following up with me 6 months since her last appointment re: a history of Bipolar disorder (type II) with a predominance of depressive symptoms/episodes, along with some mixed Anxiety symptoms.  She had been followed by several psychiatrists in [facility] since 1997, but over 3 years ago she and her  moved to [place] related to his work, and she wanted to be followed closer to home. She'd been fairly stable so we continued the same med regimen she's been on for a number of years, but Marivel Calvillo referred her for some therapy as she was beginning to feel more anxious and emotionally flat, possibly due to some medical issues (SLE and Fibro) as well as her job (Hospice RN). She was feeling \"really good\" after that until the last time when she was more \"rodas\" so we tried to increase the Zyprexa to 5 mg, but it was too sedating at that dose.     She states that she's generally been feeling \"pretty good\", although she still has some brief moments of irritability at times. However, she feels that her mood has been fairly stable for the most part and that she's not had any more severe or overt affective issues for awhile now. She still has some stressors that affect her, but she's coping with them quite well, and her overall N/V functioning has been fairly good. She's trying to focus more on sleeping consistently and also to start exercising a little more, both of which will help control the BPAD. Denies having any feelings of hopelessness or any SI at all.  No med SE's reported or noted including no weight gain, EPSE's, TD sx's, etc.      CURRENT MEDICATIONS:  1.  Prozac 20 mg daily  2.  Zyprexa 2.5 mg qhs--since 2009 (sedated with the 5 mg dose)  3.  Clonazepam 0.5 mg daily prn--taking 0.25 mg bid      MENTAL STATUS EXAM:  May was noted to be very pleasant and cooperative, and reported having less affective instability this time and no overt depression or agitation. She was euthymic and affectively stable, and she denied having any significant neurovegetative dysfunction, as well as any feelings of hopelessness, suicidal thinking, or any passive thoughts about wishing she were dead. There was no evidence of any ebony or hypomania at all, and she denied any notable symptoms of anxiety as well. There was no evidence of any psychosis such as hallucinations or delusional thinking.  Her judgment and insight appears to be quite good.  Her cognitive functioning appears to be fully intact with no deficits noted.     DIAGNOSTIC IMPRESSION:  AXIS I:  Bipolar disorder type II--some mild mixed symptoms (F31.81)                Unspecified Anxiety disorder (F41.9)  AXIS II:  Deferred. AXIS III:  Systemic lupus; fibromyalgia; hypertension; hyperglycemia; hypercholesterolemia     PLAN:  1.  Continue the Prozac at 20 mg daily--#30 with 5 refills (30 day). 2.  Continue the Zyprexa at 2.5 mg at bedtime--#30 with 5 refills (30 day). 3.  Continue the Clonazepam at 0.5 mg daily prn--#30 with 5 refills (30 day). 4.  Follow up with me in 6 months, sooner if needed.     I affirm this is a Patient-Initiated-Episode with a patient who has not had a related appointment within my department in the past 7 days or scheduled within the next 24 hours. Total Time: 13 minutes  Note: not billable if the call serves to triage the patient into an appointment for the relevant concern. Patient was evaluated by phone call and had no access to a computer or smart phone. Chart reviewed. Evaluated and management per the note above. POS: patient at home; provider in office.

## 2021-10-01 RX ORDER — LOSARTAN POTASSIUM 100 MG/1
TABLET ORAL
Qty: 90 TABLET | Refills: 0 | Status: SHIPPED | OUTPATIENT
Start: 2021-10-01 | End: 2021-12-29

## 2021-10-05 ENCOUNTER — LAB ONLY (OUTPATIENT)
Dept: FAMILY MEDICINE CLINIC | Age: 56
End: 2021-10-05
Payer: COMMERCIAL

## 2021-10-05 DIAGNOSIS — I10 PRIMARY HYPERTENSION: Primary | ICD-10-CM

## 2021-10-05 DIAGNOSIS — E78.5 HYPERLIPIDEMIA, UNSPECIFIED HYPERLIPIDEMIA TYPE: ICD-10-CM

## 2021-10-05 DIAGNOSIS — Z13.1 SCREENING FOR DIABETES MELLITUS: ICD-10-CM

## 2021-10-05 DIAGNOSIS — E03.9 ACQUIRED HYPOTHYROIDISM: ICD-10-CM

## 2021-10-05 LAB
ALBUMIN SERPL-MCNC: 4.1 G/DL (ref 3.5–5)
ALBUMIN/GLOB SERPL: 1.1 {RATIO} (ref 1.1–2.2)
ALP SERPL-CCNC: 67 U/L (ref 45–117)
ALT SERPL-CCNC: 34 U/L (ref 12–78)
ANION GAP SERPL CALC-SCNC: 9 MMOL/L (ref 5–15)
AST SERPL-CCNC: 18 U/L (ref 15–37)
BASOPHILS # BLD: 0.1 K/UL (ref 0–0.1)
BASOPHILS NFR BLD: 1 % (ref 0–1)
BILIRUB SERPL-MCNC: 0.3 MG/DL (ref 0.2–1)
BUN SERPL-MCNC: 18 MG/DL (ref 6–20)
BUN/CREAT SERPL: 21 (ref 12–20)
CALCIUM SERPL-MCNC: 10.7 MG/DL (ref 8.5–10.1)
CHLORIDE SERPL-SCNC: 105 MMOL/L (ref 97–108)
CHOLEST SERPL-MCNC: 204 MG/DL
CO2 SERPL-SCNC: 24 MMOL/L (ref 21–32)
COMMENT, HOLDF: NORMAL
CREAT SERPL-MCNC: 0.84 MG/DL (ref 0.55–1.02)
DIFFERENTIAL METHOD BLD: ABNORMAL
EOSINOPHIL # BLD: 0.3 K/UL (ref 0–0.4)
EOSINOPHIL NFR BLD: 4 % (ref 0–7)
ERYTHROCYTE [DISTWIDTH] IN BLOOD BY AUTOMATED COUNT: 15.8 % (ref 11.5–14.5)
EST. AVERAGE GLUCOSE BLD GHB EST-MCNC: 134 MG/DL
GLOBULIN SER CALC-MCNC: 3.8 G/DL (ref 2–4)
GLUCOSE SERPL-MCNC: 97 MG/DL (ref 65–100)
HBA1C MFR BLD: 6.3 % (ref 4–5.6)
HCT VFR BLD AUTO: 44.5 % (ref 35–47)
HDLC SERPL-MCNC: 81 MG/DL
HDLC SERPL: 2.5 {RATIO} (ref 0–5)
HGB BLD-MCNC: 14.3 G/DL (ref 11.5–16)
IMM GRANULOCYTES # BLD AUTO: 0 K/UL (ref 0–0.04)
IMM GRANULOCYTES NFR BLD AUTO: 0 % (ref 0–0.5)
LDLC SERPL CALC-MCNC: 93 MG/DL (ref 0–100)
LYMPHOCYTES # BLD: 3.2 K/UL (ref 0.8–3.5)
LYMPHOCYTES NFR BLD: 37 % (ref 12–49)
MCH RBC QN AUTO: 29.9 PG (ref 26–34)
MCHC RBC AUTO-ENTMCNC: 32.1 G/DL (ref 30–36.5)
MCV RBC AUTO: 92.9 FL (ref 80–99)
MONOCYTES # BLD: 0.9 K/UL (ref 0–1)
MONOCYTES NFR BLD: 10 % (ref 5–13)
NEUTS SEG # BLD: 4.2 K/UL (ref 1.8–8)
NEUTS SEG NFR BLD: 48 % (ref 32–75)
NRBC # BLD: 0 K/UL (ref 0–0.01)
NRBC BLD-RTO: 0 PER 100 WBC
PLATELET # BLD AUTO: 429 K/UL (ref 150–400)
PMV BLD AUTO: 10.9 FL (ref 8.9–12.9)
POTASSIUM SERPL-SCNC: 4.2 MMOL/L (ref 3.5–5.1)
PROT SERPL-MCNC: 7.9 G/DL (ref 6.4–8.2)
RBC # BLD AUTO: 4.79 M/UL (ref 3.8–5.2)
SAMPLES BEING HELD,HOLD: NORMAL
SODIUM SERPL-SCNC: 138 MMOL/L (ref 136–145)
TRIGL SERPL-MCNC: 150 MG/DL (ref ?–150)
TSH SERPL DL<=0.05 MIU/L-ACNC: 0.36 UIU/ML (ref 0.36–3.74)
VLDLC SERPL CALC-MCNC: 30 MG/DL
WBC # BLD AUTO: 8.7 K/UL (ref 3.6–11)

## 2021-10-05 PROCEDURE — 36415 COLL VENOUS BLD VENIPUNCTURE: CPT | Performed by: NURSE PRACTITIONER

## 2021-10-05 NOTE — PROGRESS NOTES
Patient presented to the office for lab BW via left arm venipuncture without any complications, 2 gold SST, 1 light green and 1 lavender sent. Patient had negative Covid screening and 97.5 temp, core.

## 2021-10-06 ENCOUNTER — TRANSCRIBE ORDER (OUTPATIENT)
Dept: SCHEDULING | Age: 56
End: 2021-10-06

## 2021-10-06 DIAGNOSIS — Z12.31 BREAST CANCER SCREENING BY MAMMOGRAM: Primary | ICD-10-CM

## 2021-10-07 NOTE — PROGRESS NOTES
Patient verified by stating name and date of birth.  Patient informed of lab results and states understanding per Anny Isidro

## 2021-10-11 ENCOUNTER — VIRTUAL VISIT (OUTPATIENT)
Dept: FAMILY MEDICINE CLINIC | Age: 56
End: 2021-10-11

## 2021-10-11 DIAGNOSIS — E78.5 HYPERLIPIDEMIA, UNSPECIFIED HYPERLIPIDEMIA TYPE: ICD-10-CM

## 2021-10-11 DIAGNOSIS — E03.9 ACQUIRED HYPOTHYROIDISM: ICD-10-CM

## 2021-10-11 DIAGNOSIS — I10 PRIMARY HYPERTENSION: Primary | ICD-10-CM

## 2021-10-11 PROCEDURE — 99442 PR PHYS/QHP TELEPHONE EVALUATION 11-20 MIN: CPT | Performed by: NURSE PRACTITIONER

## 2021-10-11 NOTE — PROGRESS NOTES
Chief Complaint   Patient presents with    Medication Evaluation     1. Have you been to the ER, urgent care clinic since your last visit? Hospitalized since your last visit? No    2. Have you seen or consulted any other health care providers outside of the [address] [place] [address] since your last visit? Include any pap smears or colon screening. No  Abuse Screening Questionnaire 10/11/2021   Do you ever feel afraid of your partner? N   Are you in a relationship with someone who physically or mentally threatens you? N   Is it safe for you to go home? Y     3 most recent PHQ Screens 10/11/2021   Little interest or pleasure in doing things Not at all   Feeling down, depressed, irritable, or hopeless Not at all   Total Score PHQ 2 0     Fall Risk Assessment, last 12 mths 10/11/2021   Able to walk? Yes   Fall in past 12 months? 0   Do you feel unsteady? 0   Are you worried about falling 0   Is TUG test greater than 12 seconds?  0   Is the gait abnormal? 0

## 2021-10-12 NOTE — ACP (ADVANCE CARE PLANNING)
Discussed importance of advanced medical directives with patient. Patient is capable of making decisions.  Scott Ivan NP-C

## 2021-10-12 NOTE — PROGRESS NOTES
Steven Patel is a 48 y.o. female, evaluated via audio-only technology on 10/11/2021 for Hypertension and Diabetes  . HTN - Continue taking losartan for HTN  Hyperlipidemia-Continue taking rosuvastatin for cholesterol. Doing well with diet and activity. DM; -Continue taking metformin XR for diabetes. HGBA1C 6.3    Acquired hypothyroid-TSH within normal limits     Labs reviewed at previous phone call no questions at this time. Seen remotely  In vehicle with individual who has recently been exposed to [place]. Assessment & Plan:   Diagnoses and all orders for this visit:    1. Primary hypertension  Renew losartan ,heart healthy diet and exercise    2. Hyperlipidemia, unspecified hyperlipidemia type  Renew Crestor ,heart healthy diet and exercise    3. Acquired hypothyroidism  Renew levothyroxine 100 mcg as directed. The complexity of medical decision making for this visit is moderate         12  Subjective:       Prior to Admission medications    Medication Sig Start Date End Date Taking? Authorizing Provider   rosuvastatin (CRESTOR) 10 mg tablet TAKE 1 TABLET BY MOUTH DAILY 10/1/21   Rafia Lopez NP   losartan (COZAAR) 100 mg tablet TAKE 1 TABLET BY MOUTH EVERY DAY 10/1/21   Manuelito Conley NP   metFORMIN ER (GLUCOPHAGE XR) 500 mg tablet TAKE 1 TABLET BY MOUTH ONCE DAILY WITH DINNER 10/1/21   Jeni Lopez NP   clonazePAM (KlonoPIN) 0.5 mg tablet Take 1 Tablet by mouth daily as needed (Anxiety). 9/22/21   Heydi Tatum MD   FLUoxetine (PROzac) 20 mg capsule Take 1 Capsule by mouth daily. 9/22/21   Heydi Tatum MD   OLANZapine (ZyPREXA) 2.5 mg tablet Take 1 Tablet by mouth nightly.  9/22/21   Heydi Tatum MD   levothyroxine (SYNTHROID) 100 mcg tablet TAKE 1 TABLET DAILY ONE HOUR PRIOR TO BREAKFAST OR 2 TO 3 HOURS AFTER DINNER(IMPORTANT TO TAKE ON EMPTY STOMACH FOR ABSORPTION)  Indications: a condition with low thyroid hormone levels 7/16/21   Manuelito Conley NP   spironolactone (ALDACTONE) 25 mg tablet Take 2 Tabs by mouth daily. 5/14/21   Nadir Lam,    famotidine (Pepcid) 40 mg tablet Take 40 mg by mouth daily. Provider, Historical   EPINEPHrine (EPIPEN) 0.3 mg/0.3 mL injection INJECT 0.3 ML INTO THE MUSCLE AS DIRECTED ONCE AS NEEDED FOR ALLERGIC RESPONSE. CALL HEALTHCARE PROVIDER OR EMERGENCY ROOM IMMEDIATELY 1/6/21   Provider, Historical   glucose blood VI test strips (OneTouch Verio test strips) strip OneTouch Verio test strips    Provider, Historical   albuterol (ProAir HFA) 90 mcg/actuation inhaler Take 2 Puffs by inhalation every four (4) hours as needed for Wheezing or Shortness of Breath. 4/13/20   Ilan Alfaro DO   hydroxychloroquine (Plaquenil) 200 mg tablet Take 200 mg by mouth two (2) times a day.     Provider, Historical   meloxicam (MOBIC) 7.5 mg tablet TK 1 T PO QD WITH FOOD 1/15/20   Provider, Historical   ONETOUCH VERIO strip as directed TEST 3 TIMES A WEEK 8/26/19   Antolin Canada, NP   lancets (ONE TOUCH DELICA) 35 gauge misc OneTouch Delica Lancets 33 gauge    Provider, Historical   tiZANidine (ZANAFLEX) 6 mg capsule  4/24/17   Provider, Historical     Patient Active Problem List   Diagnosis Code    Bipolar II disorder, most recent episode major depressive ([facility] F31.81    Lupus (systemic lupus erythematosus) ([facility] M32.9    Fibromyalgia M79.7    Leukocytosis D72.829    Malaise and fatigue R53.81, R53.83    Hypertension I10    Controlled type 2 diabetes mellitus with complication, without long-term current use of insulin ([facility]) E11.8    Pure hyperglyceridemia E78.1    Severe obesity ([facility] E66.01    Uterine leiomyoma D25.9    Asplenia Q89.01    Encounter for weight management Z76.89    Stomach pain R10.9     Patient Active Problem List    Diagnosis Date Noted    Encounter for weight management 05/06/2021    Stomach pain 05/06/2021    Asplenia 12/27/2018    Severe obesity ([facility] 12/07/2018    Uterine leiomyoma 11/26/2018    Pure hyperglyceridemia 10/21/2018    Hypertension 10/19/2018    Controlled type 2 diabetes mellitus with complication, without long-term current use of insulin [facility] 10/19/2018    Bipolar II disorder, most recent episode major depressive ([facility] 10/17/2018    Lupus (systemic lupus erythematosus) ([facility] 10/17/2018    Fibromyalgia 10/17/2018    Leukocytosis 05/30/2010    Malaise and fatigue 05/30/2010     Current Outpatient Medications   Medication Sig Dispense Refill    rosuvastatin (CRESTOR) 10 mg tablet TAKE 1 TABLET BY MOUTH DAILY 90 Tablet 0    losartan (COZAAR) 100 mg tablet TAKE 1 TABLET BY MOUTH EVERY DAY 90 Tablet 0    metFORMIN ER (GLUCOPHAGE XR) 500 mg tablet TAKE 1 TABLET BY MOUTH ONCE DAILY WITH DINNER 90 Tablet 3    clonazePAM (KlonoPIN) 0.5 mg tablet Take 1 Tablet by mouth daily as needed (Anxiety). 30 Tablet 5    FLUoxetine (PROzac) 20 mg capsule Take 1 Capsule by mouth daily. 30 Capsule 5    OLANZapine (ZyPREXA) 2.5 mg tablet Take 1 Tablet by mouth nightly. 30 Tablet 5    levothyroxine (SYNTHROID) 100 mcg tablet TAKE 1 TABLET DAILY ONE HOUR PRIOR TO BREAKFAST OR 2 TO 3 HOURS AFTER DINNER(IMPORTANT TO TAKE ON EMPTY STOMACH FOR ABSORPTION)  Indications: a condition with low thyroid hormone levels 90 Tablet 1    spironolactone (ALDACTONE) 25 mg tablet Take 2 Tabs by mouth daily. 180 Tab 1    famotidine (Pepcid) 40 mg tablet Take 40 mg by mouth daily.  EPINEPHrine (EPIPEN) 0.3 mg/0.3 mL injection INJECT 0.3 ML INTO THE MUSCLE AS DIRECTED ONCE AS NEEDED FOR ALLERGIC RESPONSE. CALL HEALTHCARE PROVIDER OR EMERGENCY ROOM IMMEDIATELY      glucose blood VI test strips (OneTouch Verio test strips) strip OneTouch Verio test strips      albuterol (ProAir HFA) 90 mcg/actuation inhaler Take 2 Puffs by inhalation every four (4) hours as needed for Wheezing or Shortness of Breath. 1 Inhaler 2    hydroxychloroquine (Plaquenil) 200 mg tablet Take 200 mg by mouth two (2) times a day.       meloxicam (MOBIC) 7.5 mg tablet TK 1 T PO QD WITH FOOD      ONETOUCH VERIO strip as directed TEST 3 TIMES A WEEK 25 Strip 5    lancets (ONE TOUCH DELICA) 33 gauge misc OneTouch Delica Lancets 33 gauge      tiZANidine (ZANAFLEX) 6 mg capsule        Allergies   Allergen Reactions    Latex Unable to Obtain    Other Food Anaphylaxis     Pitted Fruits.  [place] Anaphylaxis    Ciprofloxacin Unable to Obtain    Levaquin [Levofloxacin] Unable to Obtain    Morphine Unable to Obtain    Pcn [Penicillins] Unable to Obtain    Vancomycin Unable to Obtain     Past Medical History:   Diagnosis Date    Depression     Diabetes [facility]     Fibromyalgia     Grade 2 ankle sprain 12/9/2018    Hypertension     Lupus (systemic lupus erythematosus) ([facility]     Menopause      Past Surgical History:   Procedure Laterality Date    HX COLONOSCOPY  2017    HX CYST REMOVAL      pilondal cyst    HX DILATION AND CURETTAGE  2015    HX KNEE ARTHROSCOPY Left 2020    HX SPLENECTOMY       Family History   Problem Relation Age of Onset    Other Mother         Mental Illness    Cancer Father         CLL.  Diabetes Father     Hypertension Father      Social History     Tobacco Use    Smoking status: Never Smoker    Smokeless tobacco: Never Used   Substance Use Topics    Alcohol use: No       ROS  Pertinent items are noted in the HPI  Patient-Reported Vitals 7/22/2020   Patient-Reported Weight 194lb   Patient-Reported Pulse 83   Patient-Reported SpO2 96       Saba Garcia, who was evaluated through a patient-initiated, synchronous (real-time) audio only encounter, and/or her healthcare decision maker, is aware that it is a billable service, with coverage as determined by her insurance carrier. She provided verbal consent to proceed: Yes. She has not had a related appointment within my department in the past 7 days or scheduled within the next 24 hours.     On this date 10/11/2021 I have spent 15 minutes reviewing previous notes, test results and face to face (virtual) with the patient discussing the diagnosis and importance of compliance with the treatment plan as well as documenting on the day of the visit.     Niki Morris NP

## 2021-10-26 ENCOUNTER — TELEPHONE (OUTPATIENT)
Dept: FAMILY MEDICINE CLINIC | Age: 56
End: 2021-10-26

## 2021-10-27 ENCOUNTER — HOSPITAL ENCOUNTER (OUTPATIENT)
Dept: MAMMOGRAPHY | Age: 56
Discharge: HOME | End: 2021-10-27
Attending: OBSTETRICS & GYNECOLOGY
Payer: COMMERCIAL

## 2021-10-27 DIAGNOSIS — Z12.31 BREAST CANCER SCREENING BY MAMMOGRAM: ICD-10-CM

## 2021-10-27 PROCEDURE — 77063 BREAST TOMOSYNTHESIS BI: CPT

## 2022-01-14 ENCOUNTER — TELEPHONE (OUTPATIENT)
Dept: FAMILY MEDICINE CLINIC | Age: 57
End: 2022-01-14

## 2022-01-14 NOTE — TELEPHONE ENCOUNTER
Pt calling stating she had a mild positive rapid result and is looking to get a pcr test please call patient at [phone]

## 2022-01-15 ENCOUNTER — TELEPHONE (OUTPATIENT)
Dept: FAMILY MEDICINE CLINIC | Age: 57
End: 2022-01-15

## 2022-01-17 ENCOUNTER — CLINICAL SUPPORT (OUTPATIENT)
Dept: FAMILY MEDICINE CLINIC | Age: 57
End: 2022-01-17

## 2022-01-17 DIAGNOSIS — Z20.822 ENCOUNTER FOR LABORATORY TESTING FOR COVID-19 VIRUS: Primary | ICD-10-CM

## 2022-01-17 NOTE — PROGRESS NOTES
Pt presents to the clinic today requesting a covid test. Pt tested positive with a rapid test on Saturday. Pt brought a \"rapid\" covid test with her and asked that we run the rapid at home test for her and \"certify\" the results. I spoke with Braulio Morin NP she states that we are not able to do that. PCR test done, labeled and sent.  KT

## 2022-01-20 ENCOUNTER — VIRTUAL VISIT (OUTPATIENT)
Dept: FAMILY MEDICINE CLINIC | Age: 57
End: 2022-01-20
Payer: COMMERCIAL

## 2022-01-20 VITALS — OXYGEN SATURATION: 97 % | TEMPERATURE: 98 F

## 2022-01-20 DIAGNOSIS — U07.1 COVID-19: Primary | ICD-10-CM

## 2022-01-20 LAB
SARS-COV-2, NAA 2 DAY TAT: NORMAL
SARS-COV-2, NAA: DETECTED

## 2022-01-20 PROCEDURE — 99443 PR PHYS/QHP TELEPHONE EVALUATION 21-30 MIN: CPT | Performed by: NURSE PRACTITIONER

## 2022-01-20 NOTE — PROGRESS NOTES
Flex Conway is a 55 y.o. female presenting for/with:    Chief Complaint   Patient presents with    Nasal Congestion     + covid, chest congestion, infusion monday     Cough     no fevers       Visit Vitals  Temp 98 °F (36.7 °C) (Temporal)   SpO2 97%     Pain Scale: /10  Pain Location:     1. Have you been to the ER, urgent care clinic since your last visit? Hospitalized since your last visit? NO    2. Have you seen or consulted any other health care providers outside of the [address] [place] [address] [place] since your last visit? Include any pap smears or colon screening. NO    Symptom review:  NO  Fever   NO  Shaking chills  YES  Cough  YES  Body aches  NO  Coughing up blood  NO  Chest congestion  NO  Chest pain  YES  Shortness of breath  NO  Profound Loss of smell/taste  NO  Nausea/Vomiting   NO  Loose stool/Diarrhea  NO  any skin issues    Patient Risk Factors Reviewed as follows:  YES  have you been in Close contact with confirmed COVID19 patient   NO  History of recent travel to affected geographical areas within the past 14 days  NO  COPD  NO  Active Cancer/Leukemia/Lymphoma/Chemotherapy  NO  Oral steroid use  NO  Pregnant  NO  Diabetes Mellitus  NO  Heart disease  NO  Asthma  NO Health care worker at home  [address] care worker  NO Is there a Pregnant Woman in the home  NO Dialysis pt in the home   NO a large number of people living in the home    Learning Assessment 10/11/2021   PRIMARY LEARNER Patient   HIGHEST LEVEL OF EDUCATION - PRIMARY LEARNER  GRADUATED HIGH SCHOOL OR GED   BARRIERS PRIMARY LEARNER NONE   CO-LEARNER CAREGIVER No   PRIMARY LANGUAGE ENGLISH   LEARNER PREFERENCE PRIMARY READING   ANSWERED BY Patient   RELATIONSHIP SELF     Fall Risk Assessment, last 12 mths 1/20/2022   Able to walk? Yes   Fall in past 12 months? 0   Do you feel unsteady? 0   Are you worried about falling 0   Is TUG test greater than 12 seconds?  -   Is the gait abnormal? -       3 most recent PHQ Screens 1/20/2022 Little interest or pleasure in doing things Not at all   Feeling down, depressed, irritable, or hopeless Not at all   Total Score PHQ 2 0     Abuse Screening Questionnaire 1/20/2022   Do you ever feel afraid of your partner? N   Are you in a relationship with someone who physically or mentally threatens you? N   Is it safe for you to go home? Y       No flowsheet data found. No advanced directives on file. Verified emergency contact.

## 2022-01-21 NOTE — PROGRESS NOTES
Jeremiah Blanchard is a 55 y.o. female, evaluated via audio-only technology on 1/20/2022 for Nasal Congestion (+ covid, chest congestion, infusion monday ) and Cough (no fevers)  . Positive for COVID 1/17/2022 requesting retest . Symptoms improving since infusion of monoclonal antibodies. Assessment & Plan:   Diagnoses and all orders for this visit:    1. COVID-19    Continue symptomatic relief. 12  Subjective:       Prior to Admission medications    Medication Sig Start Date End Date Taking? Authorizing Provider   rosuvastatin (CRESTOR) 10 mg tablet TAKE 1 TABLET BY MOUTH DAILY 1/3/22  Yes Deanna Ying NP   losartan (COZAAR) 100 mg tablet TAKE 1 TABLET BY MOUTH EVERY DAY 12/29/21  Yes Lizy Roth NP   metFORMIN ER (GLUCOPHAGE XR) 500 mg tablet TAKE 1 TABLET BY MOUTH ONCE DAILY WITH DINNER 10/1/21  Yes Jeni Lopez NP   clonazePAM (KlonoPIN) 0.5 mg tablet Take 1 Tablet by mouth daily as needed (Anxiety). 9/22/21  Yes Cayetano Matute MD   FLUoxetine (PROzac) 20 mg capsule Take 1 Capsule by mouth daily. 9/22/21  Yes Cayetano Matute MD   OLANZapine (ZyPREXA) 2.5 mg tablet Take 1 Tablet by mouth nightly. 9/22/21  Yes Cayetano Matute MD   levothyroxine (SYNTHROID) 100 mcg tablet TAKE 1 TABLET DAILY ONE HOUR PRIOR TO BREAKFAST OR 2 TO 3 HOURS AFTER DINNER(IMPORTANT TO TAKE ON EMPTY STOMACH FOR ABSORPTION)  Indications: a condition with low thyroid hormone levels 7/16/21  Yes Deanna Ying NP   spironolactone (ALDACTONE) 25 mg tablet Take 2 Tabs by mouth daily. 5/14/21  Yes Tammie Lam R,    famotidine (Pepcid) 40 mg tablet Take 40 mg by mouth daily. Yes Provider, Historical   EPINEPHrine (EPIPEN) 0.3 mg/0.3 mL injection INJECT 0.3 ML INTO THE MUSCLE AS DIRECTED ONCE AS NEEDED FOR ALLERGIC RESPONSE.  CALL HEALTHCARE PROVIDER OR EMERGENCY ROOM IMMEDIATELY 1/6/21  Yes Provider, Historical   glucose blood VI test strips (OneTouch Verio test strips) strip OneTouch Verio test strips   Yes Provider, Historical   albuterol (ProAir HFA) 90 mcg/actuation inhaler Take 2 Puffs by inhalation every four (4) hours as needed for Wheezing or Shortness of Breath. 4/13/20  Yes Tammie Lam DO   hydroxychloroquine (Plaquenil) 200 mg tablet Take 200 mg by mouth two (2) times a day.    Yes Provider, Historical   meloxicam (MOBIC) 7.5 mg tablet TK 1 T PO QD WITH FOOD 1/15/20  Yes Provider, Historical   ONETOUCH VERIO strip as directed TEST 3 TIMES A WEEK 8/26/19  Yes Daylin Mast, NP   lancets (ONE TOUCH DELICA) 33 gauge misc OneTouch Delica Lancets 35 gauge   Yes Provider, Historical   tiZANidine (ZANAFLEX) 6 mg capsule  4/24/17  Yes Provider, Historical     Patient Active Problem List   Diagnosis Code    Bipolar II disorder, most recent episode major depressive ([facility] F31.81    Lupus (systemic lupus erythematosus) ([facility] M32.9    Fibromyalgia M79.7    Leukocytosis D72.829    Malaise and fatigue R53.81, R53.83    Hypertension I10    Controlled type 2 diabetes mellitus with complication, without long-term current use of insulin ([facility]) E11.8    Pure hyperglyceridemia E78.1    Severe obesity ([facility]) E66.01    Uterine leiomyoma D25.9    Asplenia Q89.01    Encounter for weight management Z76.89    Stomach pain R10.9     Patient Active Problem List    Diagnosis Date Noted    Encounter for weight management 05/06/2021    Stomach pain 05/06/2021    Asplenia 12/27/2018    Severe obesity ([facility] 12/07/2018    Uterine leiomyoma 11/26/2018    Pure hyperglyceridemia 10/21/2018    Hypertension 10/19/2018    Controlled type 2 diabetes mellitus with complication, without long-term current use of insulin ([facility] 10/19/2018    Bipolar II disorder, most recent episode major depressive ([facility] 10/17/2018    Lupus (systemic lupus erythematosus) ([facility] 10/17/2018    Fibromyalgia 10/17/2018    Leukocytosis 05/30/2010    Malaise and fatigue 05/30/2010     Current Outpatient Medications   Medication Sig Dispense Refill    rosuvastatin (CRESTOR) 10 mg tablet TAKE 1 TABLET BY MOUTH DAILY 90 Tablet 0    losartan (COZAAR) 100 mg tablet TAKE 1 TABLET BY MOUTH EVERY DAY 90 Tablet 3    metFORMIN ER (GLUCOPHAGE XR) 500 mg tablet TAKE 1 TABLET BY MOUTH ONCE DAILY WITH DINNER 90 Tablet 3    clonazePAM (KlonoPIN) 0.5 mg tablet Take 1 Tablet by mouth daily as needed (Anxiety). 30 Tablet 5    FLUoxetine (PROzac) 20 mg capsule Take 1 Capsule by mouth daily. 30 Capsule 5    OLANZapine (ZyPREXA) 2.5 mg tablet Take 1 Tablet by mouth nightly. 30 Tablet 5    levothyroxine (SYNTHROID) 100 mcg tablet TAKE 1 TABLET DAILY ONE HOUR PRIOR TO BREAKFAST OR 2 TO 3 HOURS AFTER DINNER(IMPORTANT TO TAKE ON EMPTY STOMACH FOR ABSORPTION)  Indications: a condition with low thyroid hormone levels 90 Tablet 1    spironolactone (ALDACTONE) 25 mg tablet Take 2 Tabs by mouth daily. 180 Tab 1    famotidine (Pepcid) 40 mg tablet Take 40 mg by mouth daily.  EPINEPHrine (EPIPEN) 0.3 mg/0.3 mL injection INJECT 0.3 ML INTO THE MUSCLE AS DIRECTED ONCE AS NEEDED FOR ALLERGIC RESPONSE. CALL HEALTHCARE PROVIDER OR EMERGENCY ROOM IMMEDIATELY      glucose blood VI test strips (OneTouch Verio test strips) strip OneTouch Verio test strips      albuterol (ProAir HFA) 90 mcg/actuation inhaler Take 2 Puffs by inhalation every four (4) hours as needed for Wheezing or Shortness of Breath. 1 Inhaler 2    hydroxychloroquine (Plaquenil) 200 mg tablet Take 200 mg by mouth two (2) times a day.  meloxicam (MOBIC) 7.5 mg tablet TK 1 T PO QD WITH FOOD      ONETOUCH VERIO strip as directed TEST 3 TIMES A WEEK 25 Strip 5    lancets (ONE TOUCH DELICA) 33 gauge misc OneTouch Delica Lancets 33 gauge      tiZANidine (ZANAFLEX) 6 mg capsule        Allergies   Allergen Reactions    Latex Unable to Obtain    Other Food Anaphylaxis     Pitted Fruits.     [place] Anaphylaxis    Ciprofloxacin Unable to Obtain    Levaquin [Levofloxacin] Unable to Obtain    Morphine Unable to [employer]  Pcn [Penicillins] Unable to Obtain    Vancomycin Unable to Obtain     Past Medical History:   Diagnosis Date    Depression     Diabetes [facility]     Fibromyalgia     Grade 2 ankle sprain 12/9/2018    Hypertension     Lupus (systemic lupus erythematosus) ([facility]     Menopause      Past Surgical History:   Procedure Laterality Date    HX COLONOSCOPY  2017    HX CYST REMOVAL      pilondal cyst    HX DILATION AND CURETTAGE  2015    HX KNEE ARTHROSCOPY Left 2020    HX SPLENECTOMY       Family History   Problem Relation Age of Onset    Other Mother         Mental Illness    Cancer Father         CLL.  Diabetes Father     Hypertension Father      Social History     Tobacco Use    Smoking status: Never Smoker    Smokeless tobacco: Never Used   Substance Use Topics    Alcohol use: No       ROS  Pertinent items are noted on the HPI  No data recorded     Tanesha Fox, who was evaluated through a patient-initiated, synchronous (real-time) audio only encounter, and/or her healthcare decision maker, is aware that it is a billable service, which includes applicable co-pays, with coverage as determined by her insurance carrier. She provided verbal consent to proceed. She has not had a related appointment within my department in the past 7 days or scheduled within the next 24 hours. The patient was located at home in a state where the provider was licensed to provide care. On this date 01/20/2022 I have spent 30 minutes reviewing previous notes, test results and face to face (virtual) with the patient discussing the diagnosis and importance of compliance with the treatment plan as well as documenting on the day of the visit.     Cathi Og NP

## 2022-01-28 RX ORDER — LEVOTHYROXINE SODIUM 100 UG/1
TABLET ORAL
Qty: 90 TABLET | Refills: 1 | Status: SHIPPED | OUTPATIENT
Start: 2022-01-28 | End: 2022-07-13

## 2022-02-28 DIAGNOSIS — F31.81 BIPOLAR II DISORDER (HCC): ICD-10-CM

## 2022-02-28 RX ORDER — CLONAZEPAM 0.5 MG/1
0.5 TABLET ORAL
Qty: 90 TABLET | Refills: 1 | Status: SHIPPED | OUTPATIENT
Start: 2022-02-28 | End: 2022-09-14 | Stop reason: SDUPTHER

## 2022-02-28 RX ORDER — SPIRONOLACTONE 25 MG/1
50 TABLET ORAL DAILY
Qty: 180 TABLET | Refills: 1 | Status: SHIPPED | OUTPATIENT
Start: 2022-02-28 | End: 2022-07-13

## 2022-02-28 RX ORDER — OLANZAPINE 2.5 MG/1
2.5 TABLET ORAL
Qty: 90 TABLET | Refills: 1 | Status: SHIPPED | OUTPATIENT
Start: 2022-02-28 | End: 2022-03-30 | Stop reason: SDUPTHER

## 2022-02-28 RX ORDER — FLUOXETINE HYDROCHLORIDE 20 MG/1
20 CAPSULE ORAL DAILY
Qty: 90 CAPSULE | Refills: 1 | Status: SHIPPED | OUTPATIENT
Start: 2022-02-28 | End: 2022-07-13 | Stop reason: SDUPTHER

## 2022-03-16 ENCOUNTER — HOSPITAL ENCOUNTER (OUTPATIENT)
Dept: BEHAVIORAL/MENTAL HEALTH | Age: 57
Discharge: HOME | End: 2022-03-16
Payer: COMMERCIAL

## 2022-03-16 PROCEDURE — 99442 PR PHYS/QHP TELEPHONE EVALUATION 11-20 MIN: CPT | Performed by: PSYCHIATRY & NEUROLOGY

## 2022-03-16 NOTE — PROGRESS NOTES
Consent: The patient and/or their healthcare decision maker is aware that they may receive a bill (including co-pays) for this audio only encounter, depending on their insurance coverage, and has provided verbal consent to proceed. The patient was located in [place], where I am licensed to provide care.      INTERVAL HISTORY (Audio Only): Mrs. Ferrer is a 52-year-old  white female who is following up with me almost 6 months since her last appointment re: a history of Bipolar disorder (type II) with a predominance of depressive symptoms/episodes, along with some mixed Anxiety symptoms.  She had been followed by several psychiatrists in [facility] since 1997, but almost 4 years ago she and her  moved to [place] related to his work, and she wanted to be followed closer to home. 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 She was feeling \"really good\" for awhile after that, but then became more \"rodas\" so we tried to increase the Zyprexa to 5 mg, but it was too sedating. Last time, she was \"pretty good\" and fairly stable.     She states that she's generally been feeling \"pretty good\", but then indicates that she's been slightly more depressed lately, much of it due to various stressors (world issues, patients dying, etc.). She's not been sleeping as well--mostly middle insomnia. Her other N/V functioning has been adequate by her report. She also denies feeling more severely depressed, or having any irritability or agitation now. She's been compliant with the meds and she's now taking the  Clonazepam at night, but still not sleeping well. Has failed Ambien and Seroquel (both activating). Will retry the higher dose of Zyprexa at 5 mg qhs.  If that's not helpful, consider other options like Lunesta, Doxepin, etc. No med SE's reported or noted including no weight gain, EPSE's, TD sx's, etc.      CURRENT MEDICATIONS:  1.  Prozac 20 mg daily  2.  Zyprexa 2.5 mg qhs--since 2009 (sedated with the 5 mg dose)  3.  Clonazepam 0.5 mg daily prn--taking 0.5 mg qhs      MENTAL STATUS EXAM:  May was noted to be very pleasant and cooperative, but exhibited a more restricted affective range and some emotional flatness this time. She endorsed having some mild depressive sx's but no agitation or volatility. She denied having any significant neurovegetative dysfunction except for the insomnia, and she denied having any feelings of hopelessness, suicidal thinking, or any passive thoughts about wishing she were dead. There was no evidence of any ebony or hypomania at all, and she denied any notable symptoms of anxiety as well. There was no evidence of any psychosis such as hallucinations or delusional thinking.  Her judgment and insight appears to be quite good.  Her cognitive functioning appears to be fully intact with no deficits noted.     DIAGNOSTIC IMPRESSION:  AXIS I:  Bipolar disorder type II--some mild depressive symptoms (F31.81)                 Mixed Anxiety disorder (F41.9)  AXIS II:  Deferred. AXIS III:  Systemic lupus; fibromyalgia; hypertension; hyperglycemia; hypercholesterolemia     PLAN:  1.  Continue the Prozac at 20 mg daily--has 5+ months of refills (30 day). 2.  Re-increase the Zyprexa to 5 mg qhs--has 5+ months of the 2.5 mg tabs (30 day). Will refill at 5 mg if tolerated.   3.  Continue the Clonazepam at 0.5 mg daily prn--has 5+ months of refills (30 day). 4.  Follow up with me in 6 months, sooner if needed.     I affirm this is a Patient-Initiated-Episode with a patient who has not had a related appointment within my department in the past 7 days or scheduled within the next 24 hours.   Total Time: 15 minutes  Note: not billable if the call serves to triage the patient into an appointment for the relevant concern. Patient was evaluated by phone call and had no access to a computer or smart phone. Chart reviewed. Evaluated and management per the note above. POS: patient at home; provider in office.

## 2022-03-18 PROBLEM — M32.9 LUPUS (SYSTEMIC LUPUS ERYTHEMATOSUS) (HCC): Status: ACTIVE | Noted: 2018-10-17

## 2022-03-18 PROBLEM — M79.7 FIBROMYALGIA: Status: ACTIVE | Noted: 2018-10-17

## 2022-03-18 PROBLEM — R10.9 STOMACH PAIN: Status: ACTIVE | Noted: 2021-05-06

## 2022-03-18 PROBLEM — D25.9 UTERINE LEIOMYOMA: Status: ACTIVE | Noted: 2018-11-26

## 2022-03-18 PROBLEM — F31.81 BIPOLAR II DISORDER, MOST RECENT EPISODE MAJOR DEPRESSIVE (HCC): Status: ACTIVE | Noted: 2018-10-17

## 2022-03-18 PROBLEM — Z76.89 ENCOUNTER FOR WEIGHT MANAGEMENT: Status: ACTIVE | Noted: 2021-05-06

## 2022-03-19 PROBLEM — I10 HYPERTENSION: Status: ACTIVE | Noted: 2018-10-19

## 2022-03-19 PROBLEM — E66.01 SEVERE OBESITY (HCC): Status: ACTIVE | Noted: 2018-12-07

## 2022-03-19 PROBLEM — Q89.01 ASPLENIA: Status: ACTIVE | Noted: 2018-12-27

## 2022-03-19 PROBLEM — E78.1 PURE HYPERGLYCERIDEMIA: Status: ACTIVE | Noted: 2018-10-21

## 2022-03-20 PROBLEM — E11.8 CONTROLLED TYPE 2 DIABETES MELLITUS WITH COMPLICATION, WITHOUT LONG-TERM CURRENT USE OF INSULIN (HCC): Status: ACTIVE | Noted: 2018-10-19

## 2022-03-30 RX ORDER — OLANZAPINE 5 MG/1
5 TABLET ORAL
Qty: 90 TABLET | Refills: 1 | Status: SHIPPED | OUTPATIENT
Start: 2022-03-30 | End: 2022-05-02 | Stop reason: SDUPTHER

## 2022-05-02 ENCOUNTER — TELEPHONE (OUTPATIENT)
Dept: PSYCHIATRY | Age: 57
End: 2022-05-02

## 2022-05-02 RX ORDER — OLANZAPINE 2.5 MG/1
2.5 TABLET ORAL
Qty: 90 TABLET | Refills: 1 | Status: SHIPPED | OUTPATIENT
Start: 2022-05-02 | End: 2022-09-14 | Stop reason: SDUPTHER

## 2022-05-20 ENCOUNTER — APPOINTMENT (OUTPATIENT)
Dept: GENERAL RADIOLOGY | Age: 57
End: 2022-05-20
Attending: EMERGENCY MEDICINE
Payer: COMMERCIAL

## 2022-05-20 ENCOUNTER — HOSPITAL ENCOUNTER (EMERGENCY)
Age: 57
Discharge: HOME | End: 2022-05-20
Attending: EMERGENCY MEDICINE
Payer: COMMERCIAL

## 2022-05-20 DIAGNOSIS — J20.9 ACUTE BRONCHITIS, UNSPECIFIED ORGANISM: Primary | ICD-10-CM

## 2022-05-20 LAB
ALBUMIN SERPL-MCNC: 3.7 G/DL (ref 3.5–5)
ALBUMIN/GLOB SERPL: 1.1 {RATIO} (ref 1.1–2.2)
ALP SERPL-CCNC: 57 U/L (ref 45–117)
ALT SERPL-CCNC: 43 U/L (ref 12–78)
ANION GAP SERPL CALC-SCNC: 12 MMOL/L (ref 5–15)
APPEARANCE UR: CLEAR
AST SERPL-CCNC: 26 U/L (ref 15–37)
BACTERIA URNS QL MICRO: NEGATIVE /HPF
BASOPHILS # BLD: 0 K/UL (ref 0–0.1)
BASOPHILS NFR BLD: 0 % (ref 0–1)
BILIRUB SERPL-MCNC: 0.2 MG/DL (ref 0.2–1)
BILIRUB UR QL: NEGATIVE
BUN SERPL-MCNC: 18 MG/DL (ref 6–20)
BUN/CREAT SERPL: 20 (ref 12–20)
CALCIUM SERPL-MCNC: 9.3 MG/DL (ref 8.5–10.1)
CHLORIDE SERPL-SCNC: 105 MMOL/L (ref 97–108)
CO2 SERPL-SCNC: 26 MMOL/L (ref 21–32)
COLOR UR: ABNORMAL
CREAT SERPL-MCNC: 0.92 MG/DL (ref 0.55–1.02)
DIFFERENTIAL METHOD BLD: ABNORMAL
EOSINOPHIL # BLD: 0 K/UL (ref 0–0.4)
EOSINOPHIL NFR BLD: 0 % (ref 0–7)
EPITH CASTS URNS QL MICRO: ABNORMAL /LPF
ERYTHROCYTE [DISTWIDTH] IN BLOOD BY AUTOMATED COUNT: 15.1 % (ref 11.5–14.5)
FLUAV RNA SPEC QL NAA+PROBE: NOT DETECTED
FLUBV RNA SPEC QL NAA+PROBE: NOT DETECTED
GLOBULIN SER CALC-MCNC: 3.4 G/DL (ref 2–4)
GLUCOSE SERPL-MCNC: 110 MG/DL (ref 65–100)
GLUCOSE UR STRIP.AUTO-MCNC: NEGATIVE MG/DL
HCT VFR BLD AUTO: 39.3 % (ref 35–47)
HGB BLD-MCNC: 13.3 G/DL (ref 11.5–16)
HGB UR QL STRIP: ABNORMAL
IMM GRANULOCYTES # BLD AUTO: 0 K/UL (ref 0–0.04)
IMM GRANULOCYTES NFR BLD AUTO: 0 % (ref 0–0.5)
KETONES UR QL STRIP.AUTO: NEGATIVE MG/DL
LEUKOCYTE ESTERASE UR QL STRIP.AUTO: ABNORMAL
LYMPHOCYTES # BLD: 1.6 K/UL (ref 0.8–3.5)
LYMPHOCYTES NFR BLD: 12 % (ref 12–49)
MAGNESIUM SERPL-MCNC: 1.9 MG/DL (ref 1.6–2.4)
MCH RBC QN AUTO: 30.3 PG (ref 26–34)
MCHC RBC AUTO-ENTMCNC: 33.8 G/DL (ref 30–36.5)
MCV RBC AUTO: 89.5 FL (ref 80–99)
MONOCYTES # BLD: 1.5 K/UL (ref 0–1)
MONOCYTES NFR BLD: 10 % (ref 5–13)
NEUTS SEG # BLD: 11 K/UL (ref 1.8–8)
NEUTS SEG NFR BLD: 78 % (ref 32–75)
NITRITE UR QL STRIP.AUTO: NEGATIVE
NRBC # BLD: 0 K/UL (ref 0–0.01)
NRBC BLD-RTO: 0 PER 100 WBC
PH UR STRIP: 6 [PH] (ref 5–8)
PLATELET # BLD AUTO: 366 K/UL (ref 150–400)
PMV BLD AUTO: 10.1 FL (ref 8.9–12.9)
POTASSIUM SERPL-SCNC: 3.8 MMOL/L (ref 3.5–5.1)
PROT SERPL-MCNC: 7.1 G/DL (ref 6.4–8.2)
PROT UR STRIP-MCNC: 30 MG/DL
RBC # BLD AUTO: 4.39 M/UL (ref 3.8–5.2)
RBC #/AREA URNS HPF: ABNORMAL /HPF (ref 0–5)
SARS-COV-2, COV2: NOT DETECTED
SODIUM SERPL-SCNC: 143 MMOL/L (ref 136–145)
SP GR UR REFRACTOMETRY: 1.01 (ref 1–1.03)
TROPONIN-HIGH SENSITIVITY: 6 NG/L (ref 0–51)
UR CULT HOLD, URHOLD: NORMAL
UROBILINOGEN UR QL STRIP.AUTO: 0.2 EU/DL (ref 0.2–1)
WBC # BLD AUTO: 14.2 K/UL (ref 3.6–11)
WBC URNS QL MICRO: ABNORMAL /HPF (ref 0–4)

## 2022-05-20 PROCEDURE — 74011000250 HC RX REV CODE- 250: Performed by: FAMILY MEDICINE

## 2022-05-20 PROCEDURE — 71046 X-RAY EXAM CHEST 2 VIEWS: CPT

## 2022-05-20 PROCEDURE — 85025 COMPLETE CBC W/AUTO DIFF WBC: CPT

## 2022-05-20 PROCEDURE — 93005 ELECTROCARDIOGRAM TRACING: CPT

## 2022-05-20 PROCEDURE — 87636 SARSCOV2 & INF A&B AMP PRB: CPT

## 2022-05-20 PROCEDURE — 96360 HYDRATION IV INFUSION INIT: CPT

## 2022-05-20 PROCEDURE — 84484 ASSAY OF TROPONIN QUANT: CPT

## 2022-05-20 PROCEDURE — 74011250636 HC RX REV CODE- 250/636: Performed by: FAMILY MEDICINE

## 2022-05-20 PROCEDURE — 99285 EMERGENCY DEPT VISIT HI MDM: CPT

## 2022-05-20 PROCEDURE — 80053 COMPREHEN METABOLIC PANEL: CPT

## 2022-05-20 PROCEDURE — 94762 N-INVAS EAR/PLS OXIMTRY CONT: CPT

## 2022-05-20 PROCEDURE — 83735 ASSAY OF MAGNESIUM: CPT

## 2022-05-20 PROCEDURE — 36415 COLL VENOUS BLD VENIPUNCTURE: CPT

## 2022-05-20 PROCEDURE — 74011250637 HC RX REV CODE- 250/637: Performed by: FAMILY MEDICINE

## 2022-05-20 PROCEDURE — 81001 URINALYSIS AUTO W/SCOPE: CPT

## 2022-05-20 RX ORDER — BENZONATATE 100 MG/1
400 CAPSULE ORAL
Status: COMPLETED | OUTPATIENT
Start: 2022-05-20 | End: 2022-05-20

## 2022-05-20 RX ORDER — DOXYCYCLINE 100 MG/1
100 CAPSULE ORAL EVERY 12 HOURS
Status: DISCONTINUED | OUTPATIENT
Start: 2022-05-20 | End: 2022-05-21 | Stop reason: HOSPADM

## 2022-05-20 RX ORDER — DOXYCYCLINE HYCLATE 100 MG
100 TABLET ORAL 2 TIMES DAILY
Qty: 14 TABLET | Refills: 0 | Status: SHIPPED | OUTPATIENT
Start: 2022-05-20 | End: 2022-05-27

## 2022-05-20 RX ORDER — BENZONATATE 100 MG/1
200 CAPSULE ORAL
Qty: 30 CAPSULE | Refills: 0 | Status: SHIPPED | OUTPATIENT
Start: 2022-05-20 | End: 2022-05-27

## 2022-05-20 RX ORDER — SODIUM CHLORIDE 0.9 % (FLUSH) 0.9 %
5-10 SYRINGE (ML) INJECTION ONCE
Status: COMPLETED | OUTPATIENT
Start: 2022-05-20 | End: 2022-05-20

## 2022-05-20 RX ADMIN — DOXYCYCLINE 100 MG: 100 CAPSULE ORAL at 23:59

## 2022-05-20 RX ADMIN — SODIUM CHLORIDE 1000 ML: 9 INJECTION, SOLUTION INTRAVENOUS at 21:53

## 2022-05-20 RX ADMIN — SODIUM CHLORIDE, PRESERVATIVE FREE 10 ML: 5 INJECTION INTRAVENOUS at 21:53

## 2022-05-20 NOTE — ED TRIAGE NOTES
Patient presents to the ED with a complaint of dizzy, wek, SOB, and cough that started Tuesday. Patient has been seen by PMD and put on mederol dose pack and Zmax. Per the patient she is getting worse. Able to speak in a clear complete sentence.

## 2022-05-20 NOTE — ED PROVIDER NOTES
55-year-old female with a history of diabetes, hypertension and lupus presents with a chief complaint of URI symptoms. This week the patient developed cough, runny nose, sore throat. She has had negative COVID testing. She was placed on a Z-Spencer and Medrol Dosepak by her primary care physician. She reports that she is not feeling any better. She does endorse central chest heaviness. She does not rate her pain on a scale. The pain is nonradiating. She denies aggravating or alleviating factors. She denies abdominal pain, GI or urinary symptoms. She is still currently taking the Z-Spencer and steroid. Past Medical History:   Diagnosis Date    Depression     Diabetes ([facility]     Fibromyalgia     Grade 2 ankle sprain 12/9/2018    Hypertension     Lupus (systemic lupus erythematosus) ([facility]     Menopause        Past Surgical History:   Procedure Laterality Date    HX COLONOSCOPY  2017    HX CYST REMOVAL      pilondal cyst    HX DILATION AND CURETTAGE  2015    HX KNEE ARTHROSCOPY Left 2020    HX SPLENECTOMY           Family History:   Problem Relation Age of Onset    Other Mother         Mental Illness    Cancer Father         CLL. 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     Hobby Hazards No    Sleep Concern No    Stress Concern Yes    Weight Concern Yes    Special Diet Yes     Comment: Food Allergies & Diabetic    Back Care Yes    Exercise Yes    Bike Helmet Not Asked    Seat Belt Yes    Self-Exams Yes   Social History Narrative    Not on file     Social Determinants of Health     Financial Resource Strain:     Difficulty of Paying Living Expenses: Not on file   Food Insecurity:     Worried About Running Out of Food in the Last Year: Not on file    Katie of Food in the Last Year: Not on file   Transportation Needs:     Lack of Transportation (Medical): Not on file    Lack of Transportation (Non-Medical): Not on file   Physical Activity:     Days of Exercise per Week: Not on file    Minutes of Exercise per Session: Not on file   Stress:     Feeling of Stress : Not on file   Social Connections:     Frequency of Communication with Friends and Family: Not on file    Frequency of Social Gatherings with Friends and Family: Not on file    Attends Alevism Services: Not on file    Active Member of [address] [place] [address] or Organizations: Not on file    Attends Club or Organization Meetings: Not on file    Marital Status: Not on file   Intimate Partner Violence:     Fear of Current or Ex-Partner: Not on file    Emotionally Abused: Not on file    Physically Abused: Not on file    Sexually Abused: Not on file   Housing Stability:     Unable to Pay for Housing in the Last Year: Not on file    Number of [place] in the Last Year: Not on file    Unstable Housing in the Last Year: Not on file         ALLERGIES: Latex, Other food, [place], Ciprofloxacin, Levaquin [levofloxacin], Morphine, Pcn [penicillins], and Vancomycin    Review of Systems   Constitutional: Positive for fatigue and fever. HENT: Negative for rhinorrhea. Respiratory: Positive for cough. Cardiovascular: Positive for chest pain. Gastrointestinal: Negative for abdominal pain. Genitourinary: Negative for dysuria. Musculoskeletal: Negative for back pain. Skin: Negative for wound. Neurological: Negative for headaches. Psychiatric/Behavioral: Negative for confusion. Vitals:    05/20/22 2253 05/20/22 2308 05/20/22 2323 05/20/22 2353   BP:    128/61   Pulse: (!) 58 74 65 65   Resp: 12 14 13 16   Temp:       SpO2: 97% 97% 95% 95%   Weight:       Height:                Physical Exam  Vitals and nursing note reviewed. Constitutional:       General: She is not in acute distress. Appearance: Normal appearance. She is not ill-appearing, toxic-appearing or diaphoretic. HENT:      Head: Normocephalic and atraumatic. Eyes:      Extraocular Movements: Extraocular movements intact. Cardiovascular:      Rate and Rhythm: Normal rate. Pulses: Normal pulses. Heart sounds: Normal heart sounds. Pulmonary:      Effort: Pulmonary effort is normal. No respiratory distress. Breath sounds: Normal breath sounds. No wheezing or rales. Abdominal:      General: Abdomen is flat. Bowel sounds are normal. There is no distension. Palpations: Abdomen is soft. Tenderness: There is no abdominal tenderness. There is no guarding. Musculoskeletal:         General: Normal range of motion. Cervical back: Normal range of motion. Skin:     General: Skin is dry. Neurological:      Mental Status: She is alert and oriented to person, place, and time. Psychiatric:         Mood and Affect: Mood normal.          MDM  Number of Diagnoses or Management Options  Diagnosis management comments:     Patient presents with URI symptoms. She is currently on azithromycin and a Medrol Dosepak. Pneumonia is certainly a consideration. Labs will be obtained along with chest x-ray. Patient signed out to the night attending pending work-up and disposition. 7 PM  Change of shift. Care of patient signed over to Dr. Karen Carson. Bedside handoff complete. Awaiting labs, chest x-ray and disposition.          ED Course as of 05/21/22 0211   Sat May 21, 2022   0151 EKG demonstrates normal sinus rhythm with a normal rate of 64, normal DE interval 146, normal QRS of 100, normal QTC of 427, possible anterior infarct noted, no evidence of acute ischemia or infarction. [PS]   0396 Care assumed from Dr. Odessa Vargas. Patient had upper respiratory symptoms with dizziness and general feelings of malaise and was seen by her primary care doctor. She was started on Zithromax and steroids and continues with the symptoms. She has a nonproductive cough. Patient has a history of lupus and has had a splenectomy and she is concerned about infection. No nausea or vomiting. Dizziness is that she feels lightheaded upon standing. Physical exam reveals a white female mild discomfort vital signs reviewed skin was warm and dry without rash neck supple without JVD, lungs clear to auscultation, cardiac exam regular rate and rhythm without murmur gallop or rub, extremities without cyanosis clubbing or edema, neurologic exam is nonfocal.  Labs reviewed including urinalysis remarkable for small amount of protein CBC with slightly elevated white count of 14,000 probably secondary to steroid use and essentially normal CMP. Troponin was low at 6 COVID and influenza were negative and chest x-ray was without acute process. Patient was hydrated with normal saline and stated that she felt better at the time of discharge discharged home to stop using promethazine DM as this may be causing some of her fatigue, will use Tessalon if needed for coughing. We will change patient to doxycycline 100 mg twice a day and continue steroids. She is to return if worse or for any change in symptoms. Otherwise follow-up with Dr. Julian Paige.  [PS]      ED Course User Index  [PS] Wilfred Magdaleno MD       Procedures

## 2022-05-21 VITALS
OXYGEN SATURATION: 95 % | DIASTOLIC BLOOD PRESSURE: 61 MMHG | BODY MASS INDEX: 33.31 KG/M2 | WEIGHT: 181 LBS | RESPIRATION RATE: 16 BRPM | TEMPERATURE: 98.3 F | HEIGHT: 62 IN | SYSTOLIC BLOOD PRESSURE: 128 MMHG | HEART RATE: 65 BPM

## 2022-05-21 NOTE — DISCHARGE INSTRUCTIONS
Return if worse or for problems as noted above. Stop Promethazine DM as it may be new tired. Increase fluid as possible. Doxycyclin 100 mg twice a day for 7 days. Okay to take Mucinex and dextromethorphan they help with your cough.

## 2022-05-22 LAB
ATRIAL RATE: 64 BPM
CALCULATED P AXIS, ECG09: 60 DEGREES
CALCULATED R AXIS, ECG10: 5 DEGREES
CALCULATED T AXIS, ECG11: 27 DEGREES
DIAGNOSIS, 93000: NORMAL
P-R INTERVAL, ECG05: 146 MS
Q-T INTERVAL, ECG07: 414 MS
QRS DURATION, ECG06: 100 MS
QTC CALCULATION (BEZET), ECG08: 427 MS
VENTRICULAR RATE, ECG03: 64 BPM

## 2022-05-23 ENCOUNTER — APPOINTMENT (OUTPATIENT)
Dept: GENERAL RADIOLOGY | Age: 57
DRG: 195 | End: 2022-05-23
Attending: EMERGENCY MEDICINE
Payer: COMMERCIAL

## 2022-05-23 ENCOUNTER — HOSPITAL ENCOUNTER (INPATIENT)
Age: 57
LOS: 4 days | Discharge: HOME | DRG: 195 | End: 2022-05-27
Attending: EMERGENCY MEDICINE | Admitting: INTERNAL MEDICINE
Payer: COMMERCIAL

## 2022-05-23 DIAGNOSIS — E87.6 HYPOKALEMIA: ICD-10-CM

## 2022-05-23 DIAGNOSIS — D72.829 LEUKOCYTOSIS, UNSPECIFIED TYPE: ICD-10-CM

## 2022-05-23 DIAGNOSIS — J18.9 PNEUMONIA OF RIGHT UPPER LOBE DUE TO INFECTIOUS ORGANISM: Primary | ICD-10-CM

## 2022-05-23 PROBLEM — E03.9 ACQUIRED HYPOTHYROIDISM: Status: ACTIVE | Noted: 2022-05-23

## 2022-05-23 PROBLEM — E03.9 ACQUIRED HYPOTHYROIDISM: Chronic | Status: ACTIVE | Noted: 2022-05-23

## 2022-05-23 LAB
ALBUMIN SERPL-MCNC: 4 G/DL (ref 3.5–5)
ALBUMIN/GLOB SERPL: 0.9 {RATIO} (ref 1.1–2.2)
ALP SERPL-CCNC: 66 U/L (ref 45–117)
ALT SERPL-CCNC: 62 U/L (ref 12–78)
ANION GAP SERPL CALC-SCNC: 14 MMOL/L (ref 5–15)
APPEARANCE UR: CLEAR
AST SERPL-CCNC: 30 U/L (ref 15–37)
ATRIAL RATE: 55 BPM
BACTERIA URNS QL MICRO: NEGATIVE /HPF
BASOPHILS # BLD: 0.1 K/UL (ref 0–0.1)
BASOPHILS NFR BLD: 0 % (ref 0–1)
BILIRUB SERPL-MCNC: 0.5 MG/DL (ref 0.2–1)
BILIRUB UR QL: NEGATIVE
BUN SERPL-MCNC: 21 MG/DL (ref 6–20)
BUN/CREAT SERPL: 21 (ref 12–20)
CALCIUM SERPL-MCNC: 10 MG/DL (ref 8.5–10.1)
CALCULATED P AXIS, ECG09: 57 DEGREES
CALCULATED R AXIS, ECG10: 4 DEGREES
CALCULATED T AXIS, ECG11: 46 DEGREES
CHLORIDE SERPL-SCNC: 104 MMOL/L (ref 97–108)
CO2 SERPL-SCNC: 24 MMOL/L (ref 21–32)
COLOR UR: ABNORMAL
CREAT SERPL-MCNC: 0.98 MG/DL (ref 0.55–1.02)
DIAGNOSIS, 93000: NORMAL
DIFFERENTIAL METHOD BLD: ABNORMAL
EOSINOPHIL # BLD: 0 K/UL (ref 0–0.4)
EOSINOPHIL NFR BLD: 0 % (ref 0–7)
EPITH CASTS URNS QL MICRO: ABNORMAL /LPF
ERYTHROCYTE [DISTWIDTH] IN BLOOD BY AUTOMATED COUNT: 15.1 % (ref 11.5–14.5)
GLOBULIN SER CALC-MCNC: 4.3 G/DL (ref 2–4)
GLUCOSE SERPL-MCNC: 102 MG/DL (ref 65–100)
GLUCOSE UR STRIP.AUTO-MCNC: NEGATIVE MG/DL
HCT VFR BLD AUTO: 43 % (ref 35–47)
HGB BLD-MCNC: 14.4 G/DL (ref 11.5–16)
HGB UR QL STRIP: ABNORMAL
IMM GRANULOCYTES # BLD AUTO: 0.1 K/UL (ref 0–0.04)
IMM GRANULOCYTES NFR BLD AUTO: 1 % (ref 0–0.5)
KETONES UR QL STRIP.AUTO: NEGATIVE MG/DL
LACTATE SERPL-SCNC: 1.7 MMOL/L (ref 0.4–2)
LEUKOCYTE ESTERASE UR QL STRIP.AUTO: NEGATIVE
LYMPHOCYTES # BLD: 3 K/UL (ref 0.8–3.5)
LYMPHOCYTES NFR BLD: 15 % (ref 12–49)
MCH RBC QN AUTO: 30.1 PG (ref 26–34)
MCHC RBC AUTO-ENTMCNC: 33.5 G/DL (ref 30–36.5)
MCV RBC AUTO: 90 FL (ref 80–99)
MONOCYTES # BLD: 1.7 K/UL (ref 0–1)
MONOCYTES NFR BLD: 8 % (ref 5–13)
NEUTS SEG # BLD: 15.6 K/UL (ref 1.8–8)
NEUTS SEG NFR BLD: 76 % (ref 32–75)
NITRITE UR QL STRIP.AUTO: NEGATIVE
NRBC # BLD: 0 K/UL (ref 0–0.01)
NRBC BLD-RTO: 0 PER 100 WBC
P-R INTERVAL, ECG05: 148 MS
PH UR STRIP: 6.5 [PH] (ref 5–8)
PLATELET # BLD AUTO: 369 K/UL (ref 150–400)
PMV BLD AUTO: 10.1 FL (ref 8.9–12.9)
POTASSIUM SERPL-SCNC: 3.4 MMOL/L (ref 3.5–5.1)
PROT SERPL-MCNC: 8.3 G/DL (ref 6.4–8.2)
PROT UR STRIP-MCNC: ABNORMAL MG/DL
Q-T INTERVAL, ECG07: 428 MS
QRS DURATION, ECG06: 94 MS
QTC CALCULATION (BEZET), ECG08: 409 MS
RBC # BLD AUTO: 4.78 M/UL (ref 3.8–5.2)
RBC #/AREA URNS HPF: ABNORMAL /HPF (ref 0–5)
SODIUM SERPL-SCNC: 142 MMOL/L (ref 136–145)
SP GR UR REFRACTOMETRY: <1.005 (ref 1–1.03)
TROPONIN-HIGH SENSITIVITY: 6 NG/L (ref 0–51)
UA: UC IF INDICATED,UAUC: ABNORMAL
UROBILINOGEN UR QL STRIP.AUTO: 0.2 EU/DL (ref 0.2–1)
VENTRICULAR RATE, ECG03: 55 BPM
WBC # BLD AUTO: 20.5 K/UL (ref 3.6–11)
WBC URNS QL MICRO: ABNORMAL /HPF (ref 0–4)

## 2022-05-23 PROCEDURE — 94761 N-INVAS EAR/PLS OXIMETRY MLT: CPT

## 2022-05-23 PROCEDURE — 83605 ASSAY OF LACTIC ACID: CPT

## 2022-05-23 PROCEDURE — 74011000250 HC RX REV CODE- 250: Performed by: INTERNAL MEDICINE

## 2022-05-23 PROCEDURE — 99285 EMERGENCY DEPT VISIT HI MDM: CPT

## 2022-05-23 PROCEDURE — 81001 URINALYSIS AUTO W/SCOPE: CPT

## 2022-05-23 PROCEDURE — 74011250636 HC RX REV CODE- 250/636: Performed by: EMERGENCY MEDICINE

## 2022-05-23 PROCEDURE — 87040 BLOOD CULTURE FOR BACTERIA: CPT

## 2022-05-23 PROCEDURE — 94640 AIRWAY INHALATION TREATMENT: CPT

## 2022-05-23 PROCEDURE — 74011000258 HC RX REV CODE- 258: Performed by: EMERGENCY MEDICINE

## 2022-05-23 PROCEDURE — 84484 ASSAY OF TROPONIN QUANT: CPT

## 2022-05-23 PROCEDURE — 96365 THER/PROPH/DIAG IV INF INIT: CPT

## 2022-05-23 PROCEDURE — 85025 COMPLETE CBC W/AUTO DIFF WBC: CPT

## 2022-05-23 PROCEDURE — 65270000029 HC RM PRIVATE

## 2022-05-23 PROCEDURE — 77030012342 HC CHMB SPCR OPTC MDI MONA -B

## 2022-05-23 PROCEDURE — G0378 HOSPITAL OBSERVATION PER HR: HCPCS

## 2022-05-23 PROCEDURE — 77030029684 HC NEB SM VOL KT MONA -A

## 2022-05-23 PROCEDURE — 87070 CULTURE OTHR SPECIMN AEROBIC: CPT

## 2022-05-23 PROCEDURE — 80053 COMPREHEN METABOLIC PANEL: CPT

## 2022-05-23 PROCEDURE — 74011250637 HC RX REV CODE- 250/637: Performed by: EMERGENCY MEDICINE

## 2022-05-23 PROCEDURE — 36415 COLL VENOUS BLD VENIPUNCTURE: CPT

## 2022-05-23 PROCEDURE — 96368 THER/DIAG CONCURRENT INF: CPT

## 2022-05-23 PROCEDURE — 74011250637 HC RX REV CODE- 250/637: Performed by: INTERNAL MEDICINE

## 2022-05-23 PROCEDURE — 93005 ELECTROCARDIOGRAM TRACING: CPT

## 2022-05-23 PROCEDURE — 71046 X-RAY EXAM CHEST 2 VIEWS: CPT

## 2022-05-23 RX ORDER — ENOXAPARIN SODIUM 100 MG/ML
40 INJECTION SUBCUTANEOUS DAILY
Status: DISCONTINUED | OUTPATIENT
Start: 2022-05-24 | End: 2022-05-27 | Stop reason: HOSPADM

## 2022-05-23 RX ORDER — BENZONATATE 100 MG/1
200 CAPSULE ORAL
Status: DISCONTINUED | OUTPATIENT
Start: 2022-05-23 | End: 2022-05-24

## 2022-05-23 RX ORDER — FLUOXETINE HYDROCHLORIDE 20 MG/1
20 CAPSULE ORAL DAILY
Status: DISCONTINUED | OUTPATIENT
Start: 2022-05-24 | End: 2022-05-27 | Stop reason: HOSPADM

## 2022-05-23 RX ORDER — OLANZAPINE 2.5 MG/1
2.5 TABLET ORAL
Status: DISCONTINUED | OUTPATIENT
Start: 2022-05-23 | End: 2022-05-27 | Stop reason: HOSPADM

## 2022-05-23 RX ORDER — IPRATROPIUM BROMIDE AND ALBUTEROL SULFATE 2.5; .5 MG/3ML; MG/3ML
3 SOLUTION RESPIRATORY (INHALATION) 3 TIMES DAILY
Status: DISCONTINUED | OUTPATIENT
Start: 2022-05-23 | End: 2022-05-26

## 2022-05-23 RX ORDER — SPIRONOLACTONE 25 MG/1
50 TABLET ORAL DAILY
Status: DISCONTINUED | OUTPATIENT
Start: 2022-05-24 | End: 2022-05-27 | Stop reason: HOSPADM

## 2022-05-23 RX ORDER — LEVOTHYROXINE SODIUM 100 UG/1
100 TABLET ORAL
Status: DISCONTINUED | OUTPATIENT
Start: 2022-05-24 | End: 2022-05-27 | Stop reason: HOSPADM

## 2022-05-23 RX ORDER — IPRATROPIUM BROMIDE AND ALBUTEROL SULFATE 2.5; .5 MG/3ML; MG/3ML
3 SOLUTION RESPIRATORY (INHALATION)
Status: DISCONTINUED | OUTPATIENT
Start: 2022-05-23 | End: 2022-05-23

## 2022-05-23 RX ORDER — METFORMIN HYDROCHLORIDE 500 MG/1
500 TABLET, EXTENDED RELEASE ORAL
Status: DISCONTINUED | OUTPATIENT
Start: 2022-05-23 | End: 2022-05-27 | Stop reason: HOSPADM

## 2022-05-23 RX ORDER — LOSARTAN POTASSIUM 100 MG/1
100 TABLET ORAL DAILY
Status: DISCONTINUED | OUTPATIENT
Start: 2022-05-24 | End: 2022-05-27 | Stop reason: HOSPADM

## 2022-05-23 RX ORDER — CLONAZEPAM 0.5 MG/1
0.5 TABLET ORAL
Status: DISCONTINUED | OUTPATIENT
Start: 2022-05-23 | End: 2022-05-27 | Stop reason: HOSPADM

## 2022-05-23 RX ORDER — ALBUTEROL SULFATE 90 UG/1
1 AEROSOL, METERED RESPIRATORY (INHALATION)
Status: COMPLETED | OUTPATIENT
Start: 2022-05-23 | End: 2022-05-23

## 2022-05-23 RX ORDER — SODIUM CHLORIDE 0.9 % (FLUSH) 0.9 %
5-40 SYRINGE (ML) INJECTION EVERY 8 HOURS
Status: DISCONTINUED | OUTPATIENT
Start: 2022-05-23 | End: 2022-05-27 | Stop reason: HOSPADM

## 2022-05-23 RX ORDER — TIZANIDINE 4 MG/1
4 TABLET ORAL
Status: DISCONTINUED | OUTPATIENT
Start: 2022-05-23 | End: 2022-05-27 | Stop reason: HOSPADM

## 2022-05-23 RX ORDER — SODIUM CHLORIDE 0.9 % (FLUSH) 0.9 %
5-40 SYRINGE (ML) INJECTION AS NEEDED
Status: DISCONTINUED | OUTPATIENT
Start: 2022-05-23 | End: 2022-05-27 | Stop reason: HOSPADM

## 2022-05-23 RX ORDER — MELOXICAM 7.5 MG/1
7.5 TABLET ORAL
Status: DISCONTINUED | OUTPATIENT
Start: 2022-05-23 | End: 2022-05-27 | Stop reason: HOSPADM

## 2022-05-23 RX ORDER — FAMOTIDINE 20 MG/1
40 TABLET, FILM COATED ORAL DAILY
Status: DISCONTINUED | OUTPATIENT
Start: 2022-05-24 | End: 2022-05-27 | Stop reason: HOSPADM

## 2022-05-23 RX ORDER — ACETAMINOPHEN 650 MG/1
650 SUPPOSITORY RECTAL
Status: DISCONTINUED | OUTPATIENT
Start: 2022-05-23 | End: 2022-05-27 | Stop reason: HOSPADM

## 2022-05-23 RX ORDER — POLYETHYLENE GLYCOL 3350 17 G/17G
17 POWDER, FOR SOLUTION ORAL DAILY PRN
Status: DISCONTINUED | OUTPATIENT
Start: 2022-05-23 | End: 2022-05-27 | Stop reason: HOSPADM

## 2022-05-23 RX ORDER — SODIUM CHLORIDE 0.9 % (FLUSH) 0.9 %
5-10 SYRINGE (ML) INJECTION AS NEEDED
Status: DISCONTINUED | OUTPATIENT
Start: 2022-05-23 | End: 2022-05-27 | Stop reason: HOSPADM

## 2022-05-23 RX ORDER — DOXYCYCLINE 100 MG/1
100 CAPSULE ORAL EVERY 12 HOURS
Status: DISCONTINUED | OUTPATIENT
Start: 2022-05-23 | End: 2022-05-27 | Stop reason: HOSPADM

## 2022-05-23 RX ORDER — ACETAMINOPHEN 325 MG/1
650 TABLET ORAL
Status: DISCONTINUED | OUTPATIENT
Start: 2022-05-23 | End: 2022-05-27 | Stop reason: HOSPADM

## 2022-05-23 RX ADMIN — SODIUM CHLORIDE 1503 ML: 9 INJECTION, SOLUTION INTRAVENOUS at 12:49

## 2022-05-23 RX ADMIN — IPRATROPIUM BROMIDE AND ALBUTEROL SULFATE 3 ML: 2.5; .5 SOLUTION RESPIRATORY (INHALATION) at 20:21

## 2022-05-23 RX ADMIN — AZITHROMYCIN DIHYDRATE 500 MG: 500 INJECTION, POWDER, LYOPHILIZED, FOR SOLUTION INTRAVENOUS at 13:13

## 2022-05-23 RX ADMIN — METFORMIN HYDROCHLORIDE 500 MG: 500 TABLET, EXTENDED RELEASE ORAL at 17:12

## 2022-05-23 RX ADMIN — SODIUM CHLORIDE, PRESERVATIVE FREE 10 ML: 5 INJECTION INTRAVENOUS at 17:26

## 2022-05-23 RX ADMIN — Medication 1 PUFF: at 11:23

## 2022-05-23 RX ADMIN — OLANZAPINE 2.5 MG: 2.5 TABLET, FILM COATED ORAL at 21:35

## 2022-05-23 RX ADMIN — CLONAZEPAM 0.5 MG: 0.5 TABLET ORAL at 21:42

## 2022-05-23 RX ADMIN — CEFTRIAXONE SODIUM 2 G: 2 INJECTION, POWDER, FOR SOLUTION INTRAMUSCULAR; INTRAVENOUS at 12:43

## 2022-05-23 RX ADMIN — TIZANIDINE 4 MG: 4 TABLET ORAL at 21:35

## 2022-05-23 RX ADMIN — DOXYCYCLINE 100 MG: 100 CAPSULE ORAL at 21:35

## 2022-05-23 RX ADMIN — SODIUM CHLORIDE, PRESERVATIVE FREE 10 ML: 5 INJECTION INTRAVENOUS at 22:00

## 2022-05-23 NOTE — ED PROVIDER NOTES
EMERGENCY DEPARTMENT HISTORY AND PHYSICAL EXAM          Date: 5/23/2022  Patient Name: Jack Roe    History of Presenting Illness     Chief Complaint   Patient presents with    Flu Like Symptoms       History Provided By: Patient    HPI: Jack Roe is a 55 y.o. female, pmhx depression, diabetes, hypertension, lupus, fibromyalgia, who presents ambulatory to the ED c/o and fatigue    Patient explains that she has been sick for almost a week and a half. Initially started with cough and a runny nose she was prescribed a Z-Spencer with a Medrol dose pack by her primary care physician. She is not feeling any better after the antibiotics so she came to the ER on the 20th and was given a prescription for doxycycline with Tessalon Perles. At time her labs were normal and her COVID test was normal and she had a normal x-ray. She states in the past couple of days she has lost all of her appetite and she is quite fatigued and she has no energy so she decided to come back for second evaluation. Patient specifically denies any recent fevers, chills, nausea, vomiting, diarrhea, abd pain, CP, SOB, urinary sxs, changes in BM, or headache. PCP: Elayne Daily DO    Allergies: Extensive list  Goal: Splenectomy  Social Hx: -tobacco, -vaping, -EtOH, -Illicit Drugs; There are no other complaints, changes, or physical findings at this time.      Current Facility-Administered Medications   Medication Dose Route Frequency Provider Last Rate Last Admin    inhalational spacing device   Does Not Apply PRN Morgan Jansen MD        sodium chloride (NS) flush 5-10 mL  5-10 mL IntraVENous PRN Morgan Jansen MD        cefTRIAXone (ROCEPHIN) 2 g in 0.9% sodium chloride (MBP/ADV) 50 mL MBP  2 g IntraVENous Q24H Morgan Jansen MD   IV Completed at 05/23/22 1315    azithromycin (ZITHROMAX) 500 mg in 0.9% sodium chloride 250 mL ([id number])  500 mg IntraVENous Q24H Morgan Jansen MD   IV Completed at 05/23/22 1432     Current Outpatient Medications   Medication Sig Dispense Refill    doxycycline (VIBRA-TABS) 100 mg tablet Take 1 Tablet by mouth two (2) times a day for 7 days. 14 Tablet 0    benzonatate (Tessalon Perles) 100 mg capsule Take 2 Capsules by mouth three (3) times daily as needed for Cough for up to 7 days. 30 Capsule 0    OLANZapine (ZyPREXA) 2.5 mg tablet Take 1 Tablet by mouth nightly. 90 Tablet 1    spironolactone (ALDACTONE) 25 mg tablet Take 2 Tablets by mouth daily. 180 Tablet 1    FLUoxetine (PROzac) 20 mg capsule Take 1 Capsule by mouth daily. 90 Capsule 1    rosuvastatin (CRESTOR) 10 mg tablet TAKE 1 TABLET BY MOUTH DAILY 90 Tablet 0    levothyroxine (SYNTHROID) 100 mcg tablet TAKE 1 TABLET DAILY ONE HOUR PRIOR TO BREAKFAST OR 2-3 HRS AFTER DINNER 90 Tablet 1    losartan (COZAAR) 100 mg tablet TAKE 1 TABLET BY MOUTH EVERY DAY 90 Tablet 3    metFORMIN ER (GLUCOPHAGE XR) 500 mg tablet TAKE 1 TABLET BY MOUTH ONCE DAILY WITH DINNER 90 Tablet 3    famotidine (Pepcid) 40 mg tablet Take 40 mg by mouth daily.  albuterol (ProAir HFA) 90 mcg/actuation inhaler Take 2 Puffs by inhalation every four (4) hours as needed for Wheezing or Shortness of Breath. 1 Inhaler 2    clonazePAM (KlonoPIN) 0.5 mg tablet Take 1 Tablet by mouth daily as needed (Anxiety). (Patient not taking: Reported on 5/23/2022) 90 Tablet 1    EPINEPHrine (EPIPEN) 0.3 mg/0.3 mL injection INJECT 0.3 ML INTO THE MUSCLE AS DIRECTED ONCE AS NEEDED FOR ALLERGIC RESPONSE. CALL HEALTHCARE PROVIDER OR EMERGENCY ROOM IMMEDIATELY      glucose blood VI test strips (OneTouch Verio test strips) strip OneTouch Verio test strips      hydroxychloroquine (Plaquenil) 200 mg tablet Take 200 mg by mouth two (2) times a day.       meloxicam (MOBIC) 7.5 mg tablet TK 1 T PO QD WITH FOOD      ONETOUCH VERIO strip as directed TEST 3 TIMES A WEEK 25 Strip 5    lancets (ONE TOUCH DELICA) 33 gauge misc OneTouch Delica Lancets 33 gauge      tiZANidine (ZANAFLEX) 6 mg capsule  (Patient not taking: Reported on 5/23/2022)         Past History     Past Medical History:  Past Medical History:   Diagnosis Date    Depression     Diabetes ([facility] [address]     Fibromyalgia     Grade 2 ankle sprain 12/9/2018    Hypertension     Lupus (systemic lupus erythematosus) ([facility] [address]     Menopause        Past Surgical History:  Past Surgical History:   Procedure Laterality Date    HX COLONOSCOPY  2017    HX CYST REMOVAL      pilondal cyst    HX DILATION AND CURETTAGE  2015    HX KNEE ARTHROSCOPY Left 2020    HX SPLENECTOMY         Family History:  Family History   Problem Relation Age of Onset    Other Mother         Mental Illness    Cancer Father         CLL.  Diabetes Father     Hypertension Father        Social History:  Social History     Tobacco Use    Smoking status: Never Smoker    Smokeless tobacco: Never Used   Substance Use Topics    Alcohol use: No    Drug use: No       Allergies: Allergies   Allergen Reactions    Latex Unable to Obtain    Other Food Anaphylaxis     Pitted Fruits.  [place] Anaphylaxis    Ciprofloxacin Unable to Obtain    Levaquin [Levofloxacin] Unable to Obtain    Morphine Unable to Obtain    Pcn [Penicillins] Unable to Obtain    Vancomycin Unable to Obtain         Review of Systems   Review of Systems   Constitutional: Positive for activity change, appetite change and fatigue. Negative for chills, fever and unexpected weight change. HENT: Positive for congestion. Eyes: Negative for pain and visual disturbance. Respiratory: Positive for cough. Negative for shortness of breath. Cardiovascular: Negative for chest pain. Gastrointestinal: Negative for abdominal pain, diarrhea, nausea and vomiting. Genitourinary: Negative for dysuria. Musculoskeletal: Negative for back pain. Skin: Negative for rash. Neurological: Negative for headaches. Physical Exam   Physical Exam  Vitals and nursing note reviewed. Constitutional:       Appearance: She is well-developed. She is not diaphoretic. Comments: Middle-aged female with a flattened affect, currently in mild acute distress with significantly elevated blood pressure   HENT:      Head: Normocephalic and atraumatic. Eyes:      General:         Right eye: No discharge. Left eye: No discharge. Conjunctiva/sclera: Conjunctivae normal.      Pupils: Pupils are equal, round, and reactive to light. Cardiovascular:      Rate and Rhythm: Normal rate and regular rhythm. Heart sounds: Normal heart sounds. No murmur heard. Pulmonary:      Effort: Pulmonary effort is normal. No respiratory distress. Breath sounds: No stridor. Wheezing (Diffuse wheezing greater in the upper than lower fields) present. No rhonchi or rales. Abdominal:      General: Bowel sounds are normal. There is no distension. Palpations: Abdomen is soft. Tenderness: There is no abdominal tenderness. There is no guarding or rebound. Musculoskeletal:         General: Normal range of motion. Cervical back: Normal range of motion and neck supple. Right lower leg: No edema. Left lower leg: No edema. Skin:     General: Skin is warm and dry. Findings: No rash. Neurological:      Mental Status: She is alert and oriented to person, place, and time. Cranial Nerves: No cranial nerve deficit. Motor: No abnormal muscle tone.        Diagnostic Study Results     Labs -     Recent Results (from the past 12 hour(s))   LACTIC ACID    Collection Time: 05/23/22 12:26 PM   Result Value Ref Range    Lactic acid 1.7 0.4 - 2.0 MMOL/L   METABOLIC PANEL, COMPREHENSIVE    Collection Time: 05/23/22 12:26 PM   Result Value Ref Range    Sodium 142 136 - 145 mmol/L    Potassium 3.4 (L) 3.5 - 5.1 mmol/L    Chloride 104 97 - 108 mmol/L    CO2 24 21 - 32 mmol/L    Anion gap 14 5 - 15 mmol/L    Glucose 102 (H) 65 - 100 mg/dL    BUN 21 (H) 6 - 20 MG/DL    Creatinine 0. 98 0.55 - 1.02 MG/DL    BUN/Creatinine ratio 21 (H) 12 - 20      GFR est AA >60 >60 ml/min/1.73m2    GFR est non-AA 59 (L) >60 ml/min/1.73m2    Calcium 10.0 8.5 - 10.1 MG/DL    Bilirubin, total 0.5 0.2 - 1.0 MG/DL    ALT (SGPT) 62 12 - 78 U/L    AST (SGOT) 30 15 - 37 U/L    Alk. phosphatase 66 45 - 117 U/L    Protein, total 8.3 (H) 6.4 - 8.2 g/dL    Albumin 4.0 3.5 - 5.0 g/dL    Globulin 4.3 (H) 2.0 - 4.0 g/dL    A-G Ratio 0.9 (L) 1.1 - 2.2     CBC WITH AUTOMATED DIFF    Collection Time: 05/23/22 12:26 PM   Result Value Ref Range    WBC 20.5 (H) 3.6 - 11.0 K/uL    RBC 4.78 3.80 - 5.20 M/uL    HGB 14.4 11.5 - 16.0 g/dL    HCT 43.0 35.0 - 47.0 %    MCV 90.0 80.0 - 99.0 FL    MCH 30.1 26.0 - 34.0 PG    MCHC 33.5 30.0 - 36.5 g/dL    RDW 15.1 (H) 11.5 - 14.5 %    PLATELET 044 403 - 467 K/uL    MPV 10.1 8.9 - 12.9 FL    NRBC 0.0 0  WBC    ABSOLUTE NRBC 0.00 0.00 - 0.01 K/uL    NEUTROPHILS 76 (H) 32 - 75 %    LYMPHOCYTES 15 12 - 49 %    MONOCYTES 8 5 - 13 %    EOSINOPHILS 0 0 - 7 %    BASOPHILS 0 0 - 1 %    IMMATURE GRANULOCYTES 1 (H) 0.0 - 0.5 %    ABS. NEUTROPHILS 15.6 (H) 1.8 - 8.0 K/UL    ABS. LYMPHOCYTES 3.0 0.8 - 3.5 K/UL    ABS. MONOCYTES 1.7 (H) 0.0 - 1.0 K/UL    ABS. EOSINOPHILS 0.0 0.0 - 0.4 K/UL    ABS. BASOPHILS 0.1 0.0 - 0.1 K/UL    ABS. IMM.  GRANS. 0.1 (H) 0.00 - 0.04 K/UL    DF AUTOMATED     TROPONIN-HIGH SENSITIVITY    Collection Time: 05/23/22 12:26 PM   Result Value Ref Range    Troponin-High Sensitivity 6 0 - 51 ng/L   URINALYSIS W/ REFLEX CULTURE    Collection Time: 05/23/22 12:44 PM    Specimen: Urine   Result Value Ref Range    Color YELLOW/STRAW      Appearance CLEAR CLEAR      Specific gravity <1.005 1.003 - 1.030    pH (UA) 6.5 5.0 - 8.0      Protein TRACE (A) NEG mg/dL    Glucose Negative NEG mg/dL    Ketone Negative NEG mg/dL    Bilirubin Negative NEG      Blood TRACE (A) NEG      Urobilinogen 0.2 0.2 - 1.0 EU/dL    Nitrites Negative NEG      Leukocyte Esterase Negative NEG      WBC 0-4 0 - 4 /hpf    RBC 0-5 0 - 5 /hpf    Epithelial cells FEW FEW /lpf    Bacteria Negative NEG /hpf    UA:UC IF INDICATED CULTURE NOT INDICATED BY UA RESULT CNI     EKG, 12 LEAD, INITIAL    Collection Time: 05/23/22 12:51 PM   Result Value Ref Range    Ventricular Rate 55 BPM    Atrial Rate 55 BPM    P-R Interval 148 ms    QRS Duration 94 ms    Q-T Interval 428 ms    QTC Calculation (Bezet) 409 ms    Calculated P Axis 57 degrees    Calculated R Axis 4 degrees    Calculated T Axis 46 degrees    Diagnosis       Sinus bradycardia  Nonspecific T wave abnormality  Abnormal ECG  When compared with ECG of 20-MAY-2022 19:24,  No significant change was found         Radiologic Studies -   XR CHEST PA LAT   Final Result   Findings suggestive of developing right upper lobe infiltration. CT Results  (Last 48 hours)    None        CXR Results  (Last 48 hours)               05/23/22 1201  XR CHEST PA LAT Final result    Impression:  Findings suggestive of developing right upper lobe infiltration. Narrative:  Chest 2 views dated 5/23/2022       Comparison chest dated 5/20/2022       History is cough/cough       PA and lateral views of the chest were obtained. The cardiac silhouette is   normal in size. A somewhat focal area of hazy density is noted in the region of   the right upper lobe. This may represent developing infiltration. A follow-up   chest examination is recommended. There is evidence of thoracic spondylosis. Medical Decision Making   I am the first provider for this patient. I reviewed the vital signs, available nursing notes, past medical history, past surgical history, family history and social history. Vital Signs-Reviewed the patient's vital signs.   Patient Vitals for the past 12 hrs:   Temp Pulse BP SpO2   05/23/22 1501   (!) 160/77 96 %   05/23/22 1431   (!) 175/78 96 %   05/23/22 1401   (!) 172/80 96 %   05/23/22 1331   (!) 174/75 96 %   05/23/22 1322 98.5 °F (36.9 °C) 64 (!) 180/84 95 %   05/23/22 1153    95 %   05/23/22 1059 98.5 °F (36.9 °C) 64 (!) 193/90 94 %       Pulse Oximetry Analysis - 96% on RA    Cardiac Monitor:   Rate: 64bpm  Rhythm: Normal Sinus Rhythm      Records Reviewed: Nursing Notes, Old Medical Records, Previous Radiology Studies and Previous Laboratory Studies    Provider Notes (Medical Decision Making):   MDM: Middle-aged asplenic female presenting afebrile but hypertensive. On respiratory exam she is quite wheezy so discussed with respiratory therapy treating of albuterol and obtain an treatment sure she has not developed pneumonia as she has now developed loss of appetite and increasing fatigue. Currently have low suspicion for bacteremia, sepsis, PE, peritonitis. ED Course:   Initial assessment performed. The patients presenting problems have been discussed, and they are in agreement with the care plan formulated and outlined with them. I have encouraged them to ask questions as they arise throughout their visit. EKG interpretation: (Preliminary)  Rhythm: Sinus bradycardia at a rate of 55 bpm; normal OR; normal QRS; normal QTC and normal axis. T wave inversions noted in V1 and V2 with flattening laterally. This EKG was interpreted by ED Provider Audra Hermosillo MD    PROGRESS NOTE:    ED Course as of 05/23/22 1537   Mon May 23, 2022   1213 Result reviewed and notable for pneumonia. Concerning given patient has been on 2 courses of antibiotics. Requested bed for IV placement, cultures and lactic to be sent. Ceftriaxone to be provided as patient is allergic to penicillin but states she can take cephalosporins in the past. [JT]   1537 Dr. Alejandra Dimas has evaluated the patient and agrees to admission with continued IV antibiotics. [JT]      ED Course User Index  [JT] Ladonna Jansen MD        Critical Care Time:   0      Diagnosis     Clinical Impression:   1. Pneumonia of right upper lobe due to infectious organism    2.  Leukocytosis, unspecified type    3. Hypokalemia        PLAN:  Admission for further management and care      Please note, this dictation was completed with SVXR, the computer voice recognition software. Quite often unanticipated grammatical, syntax, homophones, and other interpretive errors are inadvertently transcribed by the computer software. Please disregard these errors. Please excuse any errors that have escaped final proof reading.

## 2022-05-23 NOTE — ROUTINE PROCESS
TRANSFER - IN REPORT:    Verbal report received from SHAWN Amezquita RN(name) on [employer]  being received from ED(unit) for routine progression of care      Report consisted of patients Situation, Background, Assessment and   Recommendations(SBAR). Information from the following report(s) SBAR, ED Summary, [facility] and Recent Results was reviewed with the receiving nurse. Opportunity for questions and clarification was provided. Assessment completed upon patients arrival to unit and care assumed.

## 2022-05-23 NOTE — H&P
[facility]   Admission History & Physical        5/23/2022 4:30 PM  Patient: Renaldo Olguin 1965  PCP: Karely Mendez DO    HISTORY  Chief Complaint:   Chief Complaint   Patient presents with    Flu Like Symptoms       HPI: 55 y.o. female presenting for admission to [address] [place] [address] for further evaluation and treatment for Community acquired pneumonia of right upper lobe of lung. She  has a past medical history of Depression, Diabetes ([facility] [address] Fibromyalgia, Grade 2 ankle sprain (12/9/2018), Hypertension, Lupus (systemic lupus erythematosus) ([facility] [address] and Menopause. She has no past medical history of Endocrine disorder. Patient presenting to the ED for further evaluation and treatment of protracted cough, malaise, and weakness. Noted the onset of symptoms last Tuesday when she presented to the PCP. Diagnosed with an upper respiratory tract infection and prescribed Azithromycin with Z-Spencer for 5 days. With persistent complaints she returned to her PCP on May 20th. She also presented to the ED on the evening of May 20. Evaluation with a chest x-ray was clear, at that time. Oratory included a CBC with a white count of 14.2, negative urine, unremarkable chemistries. Discharged to continue treatment with Doxycycline 100 mg twice daily with Tessalon Perle and Albuterol inhalers. She initially was symptomatic with a headache and sore throat, however the symptoms subsided. SHe has some sputum production which is not discolored. She denies any pleurisy or chest pain. However she does complain with chest tightness and a sense of dyspnea. She has never smoked tobacco.  She does not have any history for asthma. Past history is noted for asplenia post splenectomy for trauma and treatment with Prevnar vaccine every 5 years    Reassessment in the ED today was noted for a significant jump in the white cell count of 20.5. Screws were noted for a low potassium of 3.4 and GFR of 59.   Chest x-ray was noted for a suspected infiltrate in the right upper lung field that was not observed on the previous study 5/20. Influenza A and B as well as COVID were Not Detected on May 20. Paired blood cultures were obtained. Patient received Rocephin and Azithromycin in the ED. She has known allergy to quinolones. Past Medical History:  Past Medical History:   Diagnosis Date    Depression     Diabetes ([facility] [address]     Fibromyalgia     Grade 2 ankle sprain 12/9/2018    Hypertension     Lupus (systemic lupus erythematosus) ([facility] [address]     Menopause        Past Surgical History:  Past Surgical History:   Procedure Laterality Date    HX COLONOSCOPY  2017    HX CYST REMOVAL      pilondal cyst    HX DILATION AND CURETTAGE  2015    HX KNEE ARTHROSCOPY Left 2020    HX SPLENECTOMY         Medication:  Prior to Admission medications    Medication Sig Start Date End Date Taking? Authorizing Provider   doxycycline (VIBRA-TABS) 100 mg tablet Take 1 Tablet by mouth two (2) times a day for 7 days. 5/20/22 5/27/22 Yes Willie Miranda MD   benzonatate (Tessalon Perles) 100 mg capsule Take 2 Capsules by mouth three (3) times daily as needed for Cough for up to 7 days. 5/20/22 5/27/22 Yes Willie Miranda MD   OLANZapine (ZyPREXA) 2.5 mg tablet Take 1 Tablet by mouth nightly. 5/2/22  Yes Noé Newell MD   spironolactone (ALDACTONE) 25 mg tablet Take 2 Tablets by mouth daily. 2/28/22  Yes Chance Figueroa NP   FLUoxetine (PROzac) 20 mg capsule Take 1 Capsule by mouth daily.  2/28/22  Yes Noé Newell MD   rosuvastatin (CRESTOR) 10 mg tablet TAKE 1 TABLET BY MOUTH DAILY 2/8/22  Yes Chance Figueroa NP   levothyroxine (SYNTHROID) 100 mcg tablet TAKE 1 TABLET DAILY ONE HOUR PRIOR TO BREAKFAST OR 2-3 HRS AFTER DINNER 1/28/22  Yes Chance Figueroa NP   losartan (COZAAR) 100 mg tablet TAKE 1 TABLET BY MOUTH EVERY DAY 12/29/21  Yes Lizy Roth NP   metFORMIN ER (GLUCOPHAGE XR) 500 mg tablet TAKE 1 TABLET BY MOUTH ONCE DAILY WITH DINNER 10/1/21  Yes Jeni Lopez NP   famotidine (Pepcid) 40 mg tablet Take 40 mg by mouth daily. Yes Provider, Historical   albuterol (ProAir HFA) 90 mcg/actuation inhaler Take 2 Puffs by inhalation every four (4) hours as needed for Wheezing or Shortness of Breath. 4/13/20  Yes Tammie Lam R, DO   clonazePAM (KlonoPIN) 0.5 mg tablet Take 1 Tablet by mouth daily as needed (Anxiety). Patient not taking: Reported on 5/23/2022 2/28/22   Parish Sellers MD   EPINEPHrine (EPIPEN) 0.3 mg/0.3 mL injection INJECT 0.3 ML INTO THE MUSCLE AS DIRECTED ONCE AS NEEDED FOR ALLERGIC RESPONSE. CALL HEALTHCARE PROVIDER OR EMERGENCY ROOM IMMEDIATELY 1/6/21   Provider, Historical   glucose blood VI test strips (OneTouch Verio test strips) strip OneTouch Verio test strips    Provider, Historical   hydroxychloroquine (Plaquenil) 200 mg tablet Take 200 mg by mouth two (2) times a day. Provider, Historical   meloxicam (MOBIC) 7.5 mg tablet TK 1 T PO QD WITH FOOD 1/15/20   Provider, Historical   ONETOUCH VERIO strip as directed TEST 3 TIMES A WEEK 8/26/19   Diane Alcantar NP   lancets ([facility]) 33 gauge misc OneTouch Delica Lancets 33 gauge    Provider, Historical   tiZANidine (ZANAFLEX) 6 mg capsule  4/24/17   Provider, Historical       Allergies: Allergies   Allergen Reactions    Latex Unable to Obtain    Other Food Anaphylaxis     Pitted Fruits.  [place] Anaphylaxis    Ciprofloxacin Unable to Obtain    Levaquin [Levofloxacin] Unable to Obtain    Morphine Unable to 1 [place] [Penicillins] Unable to Obtain    Vancomycin Unable to Obtain       Social History:  Social History     Tobacco Use    Smoking status: Never Smoker    Smokeless tobacco: Never Used   Substance Use Topics    Alcohol use: No    Drug use: No       Family History:  Family History   Problem Relation Age of Onset    Other Mother         Mental Illness    Cancer Father         CLL.     Diabetes Father     Hypertension Father ROS:  Total of 12 systems reviewed as follows:  POSITIVE= bolded text  Negative = text not bolded       General:  fever, chills, sweats, generalized weakness, weight loss/gain, loss of appetite   Eyes:    blurred vision, eye pain, loss of vision, double vision  ENT:    rhinorrhea, pharyngitis   Respiratory:  cough, sputum production, SOB, RAHMAN, wheezing, pleuritic pain   Cardiology:   chest pain, palpitations, orthopnea, PND, edema, syncope   Gastrointestinal:  abdominal pain , N/V, diarrhea, dysphagia, constipation, bleeding   Genitourinary:  frequency, urgency, dysuria, hematuria, incontinence, prostatism   Muskuloskeletal: arthralgia, myalgia, back pain  Hematology:   easy bruising, nose or gum bleeding, lymphadenopathy   Dermatological: rash, ulceration, pruritis, color change / jaundice  Endocrine:   hot flashes or polydipsia   Neurological:  headache, dizziness, confusion, focal weakness, paresthesia, speech difficulties, memory loss, gait difficulty  Psychological: feelings of anxiety, depression, agitation      PHYSICAL EXAM:  Patient Vitals for the past 24 hrs:   Temp Pulse Resp BP SpO2   05/23/22 1557 98.1 °F (36.7 °C) (!) 56 16 (!) 196/80 93 %   05/23/22 1501    (!) 160/77 96 %   05/23/22 1431    (!) 175/78 96 %   05/23/22 1401    (!) 172/80 96 %   05/23/22 1331    (!) 174/75 96 %   05/23/22 1322 98.5 °F (36.9 °C) 64  (!) 180/84 95 %   05/23/22 1153     95 %   05/23/22 1059 98.5 °F (36.9 °C) 64  (!) 193/90 94 %       General:    Alert, cooperative, no distress, appears stated age. HEENT: Atraumatic, anicteric sclerae, pink conjunctivae     No oral ulcers, mucosa moist, throat clear, dentition fair  Neck:  Supple, symmetrical;   thyroid non tender  Lungs:   Clear to auscultation bilaterally. No wheezing or rhonchi. No rales. Chest wall:  No tenderness. No accessory muscle use. Heart:   Regular rhythm. No  murmur. No edema  Abdomen:   Soft, non-tender. Not distended.   Bowel sounds normal  Extremities: No cyanosis. No clubbing      Capillary refill normal,  Radial pulse 2+,  DP 2+  Skin:     Not pale. Not jaundiced. No rashes   Psych:  Not depressed. Not anxious or agitated. Neurologic: EOMs intact. No facial asymmetry. No aphasia or slurred speech. Symmetrical strength, Sensation grossly intact. Alert and oriented X 4. Lab Data Reviewed:    Recent Results (from the past 24 hour(s))   LACTIC ACID    Collection Time: 05/23/22 12:26 PM   Result Value Ref Range    Lactic acid 1.7 0.4 - 2.0 MMOL/L   METABOLIC PANEL, COMPREHENSIVE    Collection Time: 05/23/22 12:26 PM   Result Value Ref Range    Sodium 142 136 - 145 mmol/L    Potassium 3.4 (L) 3.5 - 5.1 mmol/L    Chloride 104 97 - 108 mmol/L    CO2 24 21 - 32 mmol/L    Anion gap 14 5 - 15 mmol/L    Glucose 102 (H) 65 - 100 mg/dL    BUN 21 (H) 6 - 20 MG/DL    Creatinine 0.98 0.55 - 1.02 MG/DL    BUN/Creatinine ratio 21 (H) 12 - 20      GFR est AA >60 >60 ml/min/1.73m2    GFR est non-AA 59 (L) >60 ml/min/1.73m2    Calcium 10.0 8.5 - 10.1 MG/DL    Bilirubin, total 0.5 0.2 - 1.0 MG/DL    ALT (SGPT) 62 12 - 78 U/L    AST (SGOT) 30 15 - 37 U/L    Alk. phosphatase 66 45 - 117 U/L    Protein, total 8.3 (H) 6.4 - 8.2 g/dL    Albumin 4.0 3.5 - 5.0 g/dL    Globulin 4.3 (H) 2.0 - 4.0 g/dL    A-G Ratio 0.9 (L) 1.1 - 2.2     CBC WITH AUTOMATED DIFF    Collection Time: 05/23/22 12:26 PM   Result Value Ref Range    WBC 20.5 (H) 3.6 - 11.0 K/uL    RBC 4.78 3.80 - 5.20 M/uL    HGB 14.4 11.5 - 16.0 g/dL    HCT 43.0 35.0 - 47.0 %    MCV 90.0 80.0 - 99.0 FL    MCH 30.1 26.0 - 34.0 PG    MCHC 33.5 30.0 - 36.5 g/dL    RDW 15.1 (H) 11.5 - 14.5 %    PLATELET 764 156 - 889 K/uL    MPV 10.1 8.9 - 12.9 FL    NRBC 0.0 0  WBC    ABSOLUTE NRBC 0.00 0.00 - 0.01 K/uL    NEUTROPHILS 76 (H) 32 - 75 %    LYMPHOCYTES 15 12 - 49 %    MONOCYTES 8 5 - 13 %    EOSINOPHILS 0 0 - 7 %    BASOPHILS 0 0 - 1 %    IMMATURE GRANULOCYTES 1 (H) 0.0 - 0.5 %    ABS. NEUTROPHILS 15.6 (H) 1.8 - 8.0 K/UL    ABS. LYMPHOCYTES 3.0 0.8 - 3.5 K/UL    ABS. MONOCYTES 1.7 (H) 0.0 - 1.0 K/UL    ABS. EOSINOPHILS 0.0 0.0 - 0.4 K/UL    ABS. BASOPHILS 0.1 0.0 - 0.1 K/UL    ABS. IMM. GRANS. 0.1 (H) 0.00 - 0.04 K/UL    DF AUTOMATED     TROPONIN-HIGH SENSITIVITY    Collection Time: 05/23/22 12:26 PM   Result Value Ref Range    Troponin-High Sensitivity 6 0 - 51 ng/L   URINALYSIS W/ REFLEX CULTURE    Collection Time: 05/23/22 12:44 PM    Specimen: Urine   Result Value Ref Range    Color YELLOW/STRAW      Appearance CLEAR CLEAR      Specific gravity <1.005 1.003 - 1.030    pH (UA) 6.5 5.0 - 8.0      Protein TRACE (A) NEG mg/dL    Glucose Negative NEG mg/dL    Ketone Negative NEG mg/dL    Bilirubin Negative NEG      Blood TRACE (A) NEG      Urobilinogen 0.2 0.2 - 1.0 EU/dL    Nitrites Negative NEG      Leukocyte Esterase Negative NEG      WBC 0-4 0 - 4 /hpf    RBC 0-5 0 - 5 /hpf    Epithelial cells FEW FEW /lpf    Bacteria Negative NEG /hpf    UA:UC IF INDICATED CULTURE NOT INDICATED BY UA RESULT CNI     EKG, 12 LEAD, INITIAL    Collection Time: 05/23/22 12:51 PM   Result Value Ref Range    Ventricular Rate 55 BPM    Atrial Rate 55 BPM    P-R Interval 148 ms    QRS Duration 94 ms    Q-T Interval 428 ms    QTC Calculation (Bezet) 409 ms    Calculated P Axis 57 degrees    Calculated R Axis 4 degrees    Calculated T Axis 46 degrees    Diagnosis       Sinus bradycardia  Nonspecific T wave abnormality  Abnormal ECG  When compared with ECG of 20-MAY-2022 19:24,  No significant change was found  Confirmed by Alejandra Dimas MD, Mindi Resendiz ([id number]) on 5/23/2022 4:12:01 PM         EKG: SB 55 bpm, PRWP, [address]     Radiology:  XR CHEST PA LAT   Final Result   Findings suggestive of developing right upper lobe infiltration.           Care Plan discussed with:   Patient x    Family     RN x         Consultant      Expected  Disposition:   Home with Family x   HH/PT/OT/RN    SNF/LTC    [place]      TOTAL TIME:  61 Minutes      Comments    x Reviewed previous records   >50% of visit spent in counseling and coordination of care x Discussion with patient and/or family and questions answered       _______________________________________________________  Given the patient's current clinical presentation, I have a high level of concern for decompensation if discharged from the emergency department. Complex decision making was performed, which includes reviewing the patient's available past medical records, laboratory results, and x-ray films. My assessment of this patient's clinical condition and my plan of care is as follows.     ASSESSMENT / PLAN    Principal Problem:    Community acquired pneumonia of right upper lobe of lung (5/23/2022)  Active Problems:    Asplenia (12/27/2018)      Overview: After horse riding accident  Patient being admitted due to failure of outpatient treatment  New infiltrate documented in the right upper lung fields  Is already completed 5-day course of azithromycin beginning 5/17  Also was started on doxycycline 100 mg twice daily beginning 5/20  Monitor oxygenation  DuoNeb by nebulizer 4 times daily  Paired blood culture, sputum for Gram stain and culture  Empiric treatment with Rocephin 1 g daily x7 days, doxycycline 100 mg twice daily orally x7 days      Lupus (systemic lupus erythematosus) ([facility] [address] (10/17/2018)  Patient notes a diagnosis of lupus versus fibromyalgia  Patient's med rec noted she is not using Plaquenil  Maintain Zanaflex and Klonopin at bedtime  Continue Mobic 7.5 mg daily as needed      Controlled type 2 diabetes mellitus with complication, without long-term current use of insulin (HCC) (10/19/2018)  Monitor fasting glucose daily  Maintain Metformin 500 mg daily with supper      Hypertension (10/19/2018)  Continue home regiment of Cozaar 100 mg daily, Spironolactone 25 mg daily      Pure hyperglyceridemia (10/21/2018)  Resume Crestor on discharge      Acquired hypothyroidism (5/23/2022)  Maintain Levothyroxine  100 mcg daily      SAFETY:   Code Status:Full  DVT prophylaxis:Lovenox  Stress Ulcer prophylaxis: Pepcid  Bladder catheter:no  Family Contact Info:  Primary Emergency Contact: [address], Home Phone: [phone]  Bedded: [facility] Room 124/01  Disposition: TBD, likely home when stable  Admission status:  Inpatient    -Tentative plan of care discussed with patient / family, who demonstrated understanding and is in agreement to the above  -Case was reviewed with the ED Provider, MD Adriana Gonzalez MD  [facility] Hospitalist  [phone]

## 2022-05-23 NOTE — PROGRESS NOTES
Care Management Interventions  PCP Verified by CM: Yes Alec Acevedo MD)  Last Visit to PCP: 05/20/22  Palliative Care Criteria Met (RRAT>21 & CHF Dx)?: No (No MD order)  Mode of Transport at Discharge: Other (see comment) (POV)  Transition of Care Consult (CM Consult): Discharge Planning  MyChart Signup: No  Discharge Durable Medical Equipment: No  Physical Therapy Consult: No  Occupational Therapy Consult: No  Speech Therapy Consult: No  Support Systems: Spouse/Significant Other  Confirm Follow Up Transport: Family  The Plan for Transition of Care is Related to the Following Treatment Goals : Treat pneumonia  Discharge Location  Patient Expects to be Discharged to[de-identified] Home    Patient lives at home with her spouse Adair Maldonado. Patient does NOT have ACP document but would choose her . Provided patient with ACP document info and template. Told her to fill out if she would like. Told her it just needs two witnesses. 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 Patient told to call case management for any questions or concerns.      Reason for Admission:  Pneumonia                      RUR Score:          N/A PT IN OBS STATUS           RRAT: 5 LOW            Plan for utilizing home health:   TBD        PCP: First and Last name:  Gissell Brand DO     Name of Practice: [address]    Are you a current patient: Yes/No: yes   Approximate date of last visit: 5/20/22   Can you participate in a virtual visit with your PCP: yes                    Current Advanced Directive/Advance Care Plan: Prior      Healthcare Decision Maker:   Click here to complete [id number] including selection of the Healthcare Decision Maker Relationship (ie \"Primary\")             Primary Decision Maker: Shaun Ferrer - Spouse - [phone]                  Transition of Care Plan:            Home when medically stable            Linda Tee (ACP) Conversation      Date of Conversation: 5/23/2022  Conducted with: Patient with Decision Making Capacity    Healthcare Decision Maker:     Primary Decision Maker: Shaun Ferrer - Spouse - [phone]  Click here to complete [id number] including selection of the Healthcare Decision Maker Relationship (ie \"Primary\")      Today we documented Decision Maker(s) consistent with Legal Next of Kin hierarchy.     Content/Action Overview:   Has NO ACP documents/care preferences - information provided, considering goals and options    Topics discussed: treatment goals  Additional Comments: n/a     Length of Voluntary ACP Conversation in minutes:  16 minutes    Jar[place]

## 2022-05-23 NOTE — RT PROTOCOL NOTE
RT Nebulizer Bronchodilator Protocol Note    There is a bronchodilator order in the chart from a provider indicating to follow the RT Bronchodilator Protocol and there is an Initiate RT Bronchodilator Protocol order as well (see protocol at bottom of note). CXR Findings:  Recent Results (from the past 2 days)    XR CHEST PA LAT 05/23/2022    Impression  Findings suggestive of developing right upper lobe infiltration. The findings from the last RT Protocol Assessment were as follows:  History of Pulmonary Disease: None or smoker <15 pack years  Respiratory Pattern: Dyspnea on exertion or RR 21-25 bpm  Breath Sounds: Intermittent or unilateral wheezes  Cough: Strong, productive  Indication for Bronchodilator Therapy: Decreased or absent breath sounds,Wheezing associated with pulmonary disorder  Bronchodilator Assessment Score: 7     Aerosolized bronchodilator medication orders have been revised according to the RT Nebulizer Bronchodilator Protocol below. Respiratory Therapist to perform RT Therapy Protocol Assessment initially then follow the protocol. Repeat RT Therapy Protocol Assessment PRN for score 0-3 or on second treatment, BID, and PRN for scores above 3. No Indications  adjust the frequency to every 6 hours PRN wheezing or bronchospasm, if no treatments needed after 48 hours then discontinue using Per Protocol order mode. If indication present, adjust the RT bronchodilator orders based on the Bronchodilator Assessment Score as indicated below. If a patient is on this medication at home then do not decrease Frequency below that used at home. 0-3  enter or revise RT bronchodilator order(s) to equivalent RT Bronchodilator order with Frequency of every 4 hours PRN for wheezing or increased work of breathing using Per Protocol order mode.         4-6  enter or revise RT Bronchodilator order(s) to two equivalent RT bronchodilator orders with one order with BID Frequency and one order with Frequency of every 4 hours PRN wheezing or increased work of breathing using Per Protocol order mode. 7-10  enter or revise RT Bronchodilator order(s) to two equivalent RT bronchodilator orders with one order with TID Frequency and one order with Frequency of every 4 hours PRN wheezing or increased work of breathing using Per Protocol order mode. 11-13  enter or revise RT Bronchodilator order(s) to one equivalent RT bronchodilator order with QID Frequency and an Albuterol order with Frequency of every 4 hours PRN wheezing or increased work of breathing using Per Protocol order mode. Greater than 13  enter or revise RT Bronchodilator order(s) to one equivalent RT bronchodilator order with every 4 hours Frequency and an Albuterol order with Frequency of every 2 hours PRN wheezing or increased work of breathing using Per Protocol order mode. RT to enter RT Home Evaluation for COPD & MDI Assessment order using Per Protocol order mode. Electronically signed by RT Penelope on 5/23/2022 at 7:41 PMRT Inhaler-Nebulizer Bronchodilator Protocol Note    There is a bronchodilator order in the chart from a provider indicating to follow the RT Bronchodilator Protocol and there is an Initiate RT Bronchodilator Protocol order as well (see protocol at bottom of note). CXR Findings:  Recent Results (from the past 2 days)    XR CHEST PA LAT 05/23/2022    Impression  Findings suggestive of developing right upper lobe infiltration.       The findings from the last RT Protocol Assessment were as follows:  History of Pulmonary Disease: None or smoker <15 pack years  Respiratory Pattern: Dyspnea on exertion or RR 21-25 bpm  Breath Sounds: Intermittent or unilateral wheezes  Cough: Strong, productive  Indication for Bronchodilator Therapy: Decreased or absent breath sounds,Wheezing associated with pulmonary disorder  Bronchodilator Assessment Score: 7    Aerosolized bronchodilator medication orders have been revised according to the RT Inhaler-Nebulizer Bronchodilator Protocol below. Respiratory Therapist to perform RT Therapy Protocol Assessment initially then follow the protocol. Repeat RT Therapy Protocol Assessment PRN for score 0-3 or on second treatment, BID, and PRN for scores above 3. No Indications  adjust the frequency to every 6 hours PRN wheezing or bronchospasm, if no treatments needed after 48 hours then discontinue using Per Protocol order mode. If indication present, adjust the RT bronchodilator orders based on the Bronchodilator Assessment Score as indicated below. Use Inhaler orders unless patient has one or more of the following: on home nebulizer, not able to hold breath for 10 seconds, is not alert and oriented, cannot activate and use MDI correctly, or respiratory rate 25 breaths per minute or more, then use the equivalent nebulizer order(s) with same Frequency and PRN reasons based on the score. If a patient is on this medication at home then do not decrease Frequency below that used at home. 0-3  enter or revise RT bronchodilator order(s) to equivalent RT Bronchodilator order with Frequency of every 4 hours PRN for wheezing or increased work of breathing using Per Protocol order mode. 4-6  enter or revise RT Bronchodilator order(s) to two equivalent RT bronchodilator orders with one order with BID Frequency and one order with Frequency of every 4 hours PRN wheezing or increased work of breathing using Per Protocol order mode. 7-10  enter or revise RT Bronchodilator order(s) to two equivalent RT bronchodilator orders with one order with TID Frequency and one order with Frequency of every 4 hours PRN wheezing or increased work of breathing using Per Protocol order mode.        11-13  enter or revise RT Bronchodilator order(s) to one equivalent RT bronchodilator order with QID Frequency and an Albuterol order with Frequency of every 4 hours PRN wheezing or increased work of breathing using Per Protocol order mode. Greater than 13  enter or revise RT Bronchodilator order(s) to one equivalent RT bronchodilator order with every 4 hours Frequency and an Albuterol order with Frequency of every 2 hours PRN wheezing or increased work of breathing using Per Protocol order mode. RT to enter RT Home Evaluation for COPD & MDI Assessment order using Per Protocol order mode. Electronically signed by RT Jeannette on 5/23/2022 at 7:41 PMRT Nebulizer Bronchodilator Protocol Note    There is a bronchodilator order in the chart from a provider indicating to follow the RT Bronchodilator Protocol and there is an Initiate RT Bronchodilator Protocol order as well (see protocol at bottom of note). CXR Findings:  Recent Results (from the past 2 days)    XR CHEST PA LAT 05/23/2022    Impression  Findings suggestive of developing right upper lobe infiltration. The findings from the last RT Protocol Assessment were as follows:  History of Pulmonary Disease: None or smoker <15 pack years  Respiratory Pattern: Dyspnea on exertion or RR 21-25 bpm  Breath Sounds: Intermittent or unilateral wheezes  Cough: Strong, productive  Indication for Bronchodilator Therapy: Decreased or absent breath sounds,Wheezing associated with pulmonary disorder  Bronchodilator Assessment Score: 7     Aerosolized bronchodilator medication orders have been revised according to the RT Nebulizer Bronchodilator Protocol below. Respiratory Therapist to perform RT Therapy Protocol Assessment initially then follow the protocol. Repeat RT Therapy Protocol Assessment PRN for score 0-3 or on second treatment, BID, and PRN for scores above 3. No Indications  adjust the frequency to every 6 hours PRN wheezing or bronchospasm, if no treatments needed after 48 hours then discontinue using Per Protocol order mode.      If indication present, adjust the RT bronchodilator orders based on the Bronchodilator Assessment Score as indicated below. If a patient is on this medication at home then do not decrease Frequency below that used at home. 0-3  enter or revise RT bronchodilator order(s) to equivalent RT Bronchodilator order with Frequency of every 4 hours PRN for wheezing or increased work of breathing using Per Protocol order mode. 4-6  enter or revise RT Bronchodilator order(s) to two equivalent RT bronchodilator orders with one order with BID Frequency and one order with Frequency of every 4 hours PRN wheezing or increased work of breathing using Per Protocol order mode. 7-10  enter or revise RT Bronchodilator order(s) to two equivalent RT bronchodilator orders with one order with TID Frequency and one order with Frequency of every 4 hours PRN wheezing or increased work of breathing using Per Protocol order mode. 11-13  enter or revise RT Bronchodilator order(s) to one equivalent RT bronchodilator order with QID Frequency and an Albuterol order with Frequency of every 4 hours PRN wheezing or increased work of breathing using Per Protocol order mode. Greater than 13  enter or revise RT Bronchodilator order(s) to one equivalent RT bronchodilator order with every 4 hours Frequency and an Albuterol order with Frequency of every 2 hours PRN wheezing or increased work of breathing using Per Protocol order mode. RT to enter RT Home Evaluation for COPD & MDI Assessment order using Per Protocol order mode. Electronically signed by RT Maricruz on 5/23/2022 at 7:41 PMRT Inhaler-Nebulizer Bronchodilator Protocol Note    There is a bronchodilator order in the chart from a provider indicating to follow the RT Bronchodilator Protocol and there is an Initiate RT Bronchodilator Protocol order as well (see protocol at bottom of note).     CXR Findings:  Recent Results (from the past 2 days)    XR CHEST PA LAT 05/23/2022    Impression  Findings suggestive of developing right upper lobe infiltration. The findings from the last RT Protocol Assessment were as follows:  History of Pulmonary Disease: None or smoker <15 pack years  Respiratory Pattern: Dyspnea on exertion or RR 21-25 bpm  Breath Sounds: Intermittent or unilateral wheezes  Cough: Strong, productive  Indication for Bronchodilator Therapy: Decreased or absent breath sounds,Wheezing associated with pulmonary disorder  Bronchodilator Assessment Score: 7    Aerosolized bronchodilator medication orders have been revised according to the RT Inhaler-Nebulizer Bronchodilator Protocol below. Respiratory Therapist to perform RT Therapy Protocol Assessment initially then follow the protocol. Repeat RT Therapy Protocol Assessment PRN for score 0-3 or on second treatment, BID, and PRN for scores above 3. No Indications  adjust the frequency to every 6 hours PRN wheezing or bronchospasm, if no treatments needed after 48 hours then discontinue using Per Protocol order mode. If indication present, adjust the RT bronchodilator orders based on the Bronchodilator Assessment Score as indicated below. Use Inhaler orders unless patient has one or more of the following: on home nebulizer, not able to hold breath for 10 seconds, is not alert and oriented, cannot activate and use MDI correctly, or respiratory rate 25 breaths per minute or more, then use the equivalent nebulizer order(s) with same Frequency and PRN reasons based on the score. If a patient is on this medication at home then do not decrease Frequency below that used at home. 0-3  enter or revise RT bronchodilator order(s) to equivalent RT Bronchodilator order with Frequency of every 4 hours PRN for wheezing or increased work of breathing using Per Protocol order mode.         4-6  enter or revise RT Bronchodilator order(s) to two equivalent RT bronchodilator orders with one order with BID Frequency and one order with Frequency of every 4 hours PRN wheezing or increased work of breathing using Per Protocol order mode. 7-10  enter or revise RT Bronchodilator order(s) to two equivalent RT bronchodilator orders with one order with TID Frequency and one order with Frequency of every 4 hours PRN wheezing or increased work of breathing using Per Protocol order mode. 11-13  enter or revise RT Bronchodilator order(s) to one equivalent RT bronchodilator order with QID Frequency and an Albuterol order with Frequency of every 4 hours PRN wheezing or increased work of breathing using Per Protocol order mode. Greater than 13  enter or revise RT Bronchodilator order(s) to one equivalent RT bronchodilator order with every 4 hours Frequency and an Albuterol order with Frequency of every 2 hours PRN wheezing or increased work of breathing using Per Protocol order mode. RT to enter RT Home Evaluation for COPD & MDI Assessment order using Per Protocol order mode. Electronically signed by RT Carlton on 5/23/2022 at 7:41 PMRT Inhaler-Nebulizer Bronchodilator Protocol Note    There is a bronchodilator order in the chart from a provider indicating to follow the RT Bronchodilator Protocol and there is an Initiate RT Bronchodilator Protocol order as well (see protocol at bottom of note). CXR Findings:  Recent Results (from the past 2 days)    XR CHEST PA LAT 05/23/2022    Impression  Findings suggestive of developing right upper lobe infiltration.       The findings from the last RT Protocol Assessment were as follows:  History of Pulmonary Disease: None or smoker <15 pack years  Respiratory Pattern: Dyspnea on exertion or RR 21-25 bpm  Breath Sounds: Intermittent or unilateral wheezes  Cough: Strong, productive  Indication for Bronchodilator Therapy: Decreased or absent breath sounds,Wheezing associated with pulmonary disorder  Bronchodilator Assessment Score: 7    Aerosolized bronchodilator medication orders have been revised according to the RT Inhaler-Nebulizer Bronchodilator Protocol below. Respiratory Therapist to perform RT Therapy Protocol Assessment initially then follow the protocol. Repeat RT Therapy Protocol Assessment PRN for score 0-3 or on second treatment, BID, and PRN for scores above 3. No Indications  adjust the frequency to every 6 hours PRN wheezing or bronchospasm, if no treatments needed after 48 hours then discontinue using Per Protocol order mode. If indication present, adjust the RT bronchodilator orders based on the Bronchodilator Assessment Score as indicated below. Use Inhaler orders unless patient has one or more of the following: on home nebulizer, not able to hold breath for 10 seconds, is not alert and oriented, cannot activate and use MDI correctly, or respiratory rate 25 breaths per minute or more, then use the equivalent nebulizer order(s) with same Frequency and PRN reasons based on the score. If a patient is on this medication at home then do not decrease Frequency below that used at home. 0-3  enter or revise RT bronchodilator order(s) to equivalent RT Bronchodilator order with Frequency of every 4 hours PRN for wheezing or increased work of breathing using Per Protocol order mode. 4-6  enter or revise RT Bronchodilator order(s) to two equivalent RT bronchodilator orders with one order with BID Frequency and one order with Frequency of every 4 hours PRN wheezing or increased work of breathing using Per Protocol order mode. 7-10  enter or revise RT Bronchodilator order(s) to two equivalent RT bronchodilator orders with one order with TID Frequency and one order with Frequency of every 4 hours PRN wheezing or increased work of breathing using Per Protocol order mode.        11-13  enter or revise RT Bronchodilator order(s) to one equivalent RT bronchodilator order with QID Frequency and an Albuterol order with Frequency of every 4 hours PRN wheezing or increased work of breathing using Per Protocol order mode. Greater than 13  enter or revise RT Bronchodilator order(s) to one equivalent RT bronchodilator order with every 4 hours Frequency and an Albuterol order with Frequency of every 2 hours PRN wheezing or increased work of breathing using Per Protocol order mode. RT to enter RT Home Evaluation for COPD & MDI Assessment order using Per Protocol order mode.     Electronically signed by RT Lucía on 5/23/2022 at 7:41 PM

## 2022-05-23 NOTE — ED NOTES
TRANSFER - OUT REPORT:    Verbal report given to Magnus Sigala RN(name) on Dexter Cea  being transferred to [facility](unit) for routine progression of care       Report consisted of patients Situation, Background, Assessment and   Recommendations(SBAR). Information from the following report(s) SBAR, ED Summary, [facility] and Recent Results was reviewed with the receiving nurse. Lines:   Peripheral IV Right Antecubital (Active)        Opportunity for questions and clarification was provided.       Patient transported with:

## 2022-05-23 NOTE — ED TRIAGE NOTES
Pt was dx with URI on Tuesday at PCP rx for abx and medrol does pack. On Friday pt came to ED d/t not feeling better. Abx changed to Doxy. Pt reports that today she is not feeling any better and has lost her appetite.  Afebrile and VSS

## 2022-05-24 LAB
ANION GAP SERPL CALC-SCNC: 9 MMOL/L (ref 5–15)
BASOPHILS # BLD: 0 K/UL (ref 0–0.1)
BASOPHILS NFR BLD: 0 % (ref 0–1)
BUN SERPL-MCNC: 16 MG/DL (ref 6–20)
BUN/CREAT SERPL: 19 (ref 12–20)
CALCIUM SERPL-MCNC: 8.7 MG/DL (ref 8.5–10.1)
CHLORIDE SERPL-SCNC: 106 MMOL/L (ref 97–108)
CO2 SERPL-SCNC: 27 MMOL/L (ref 21–32)
CREAT SERPL-MCNC: 0.86 MG/DL (ref 0.55–1.02)
DIFFERENTIAL METHOD BLD: ABNORMAL
EOSINOPHIL # BLD: 0.3 K/UL (ref 0–0.4)
EOSINOPHIL NFR BLD: 3 % (ref 0–7)
ERYTHROCYTE [DISTWIDTH] IN BLOOD BY AUTOMATED COUNT: 15.1 % (ref 11.5–14.5)
GLUCOSE SERPL-MCNC: 84 MG/DL (ref 65–100)
HCT VFR BLD AUTO: 37.4 % (ref 35–47)
HGB BLD-MCNC: 12.4 G/DL (ref 11.5–16)
IMM GRANULOCYTES # BLD AUTO: 0 K/UL (ref 0–0.04)
IMM GRANULOCYTES NFR BLD AUTO: 0 % (ref 0–0.5)
LYMPHOCYTES # BLD: 4.4 K/UL (ref 0.8–3.5)
LYMPHOCYTES NFR BLD: 39 % (ref 12–49)
MAGNESIUM SERPL-MCNC: 1.7 MG/DL (ref 1.6–2.4)
MCH RBC QN AUTO: 30.3 PG (ref 26–34)
MCHC RBC AUTO-ENTMCNC: 33.2 G/DL (ref 30–36.5)
MCV RBC AUTO: 91.4 FL (ref 80–99)
MONOCYTES # BLD: 1.1 K/UL (ref 0–1)
MONOCYTES NFR BLD: 10 % (ref 5–13)
NEUTS SEG # BLD: 5.4 K/UL (ref 1.8–8)
NEUTS SEG NFR BLD: 48 % (ref 32–75)
NRBC # BLD: 0 K/UL (ref 0–0.01)
NRBC BLD-RTO: 0 PER 100 WBC
PLATELET # BLD AUTO: 311 K/UL (ref 150–400)
PMV BLD AUTO: 10.6 FL (ref 8.9–12.9)
POTASSIUM SERPL-SCNC: 3.3 MMOL/L (ref 3.5–5.1)
RBC # BLD AUTO: 4.09 M/UL (ref 3.8–5.2)
SODIUM SERPL-SCNC: 142 MMOL/L (ref 136–145)
WBC # BLD AUTO: 11.3 K/UL (ref 3.6–11)

## 2022-05-24 PROCEDURE — 36415 COLL VENOUS BLD VENIPUNCTURE: CPT

## 2022-05-24 PROCEDURE — 94761 N-INVAS EAR/PLS OXIMETRY MLT: CPT

## 2022-05-24 PROCEDURE — 74011000258 HC RX REV CODE- 258: Performed by: INTERNAL MEDICINE

## 2022-05-24 PROCEDURE — 83735 ASSAY OF MAGNESIUM: CPT

## 2022-05-24 PROCEDURE — 65270000029 HC RM PRIVATE

## 2022-05-24 PROCEDURE — 74011250637 HC RX REV CODE- 250/637: Performed by: INTERNAL MEDICINE

## 2022-05-24 PROCEDURE — 80048 BASIC METABOLIC PNL TOTAL CA: CPT

## 2022-05-24 PROCEDURE — 94760 N-INVAS EAR/PLS OXIMETRY 1: CPT

## 2022-05-24 PROCEDURE — 94640 AIRWAY INHALATION TREATMENT: CPT

## 2022-05-24 PROCEDURE — 74011000250 HC RX REV CODE- 250: Performed by: INTERNAL MEDICINE

## 2022-05-24 PROCEDURE — 85025 COMPLETE CBC W/AUTO DIFF WBC: CPT

## 2022-05-24 PROCEDURE — 74011250636 HC RX REV CODE- 250/636: Performed by: INTERNAL MEDICINE

## 2022-05-24 RX ORDER — BENZONATATE 100 MG/1
200 CAPSULE ORAL 3 TIMES DAILY
Status: DISCONTINUED | OUTPATIENT
Start: 2022-05-24 | End: 2022-05-26

## 2022-05-24 RX ORDER — HYDROCODONE POLISTIREX AND CHLORPHENIRAMINE POLISTIREX 10; 8 MG/5ML; MG/5ML
5 SUSPENSION, EXTENDED RELEASE ORAL
Status: DISCONTINUED | OUTPATIENT
Start: 2022-05-24 | End: 2022-05-27 | Stop reason: HOSPADM

## 2022-05-24 RX ADMIN — CEFTRIAXONE SODIUM 1 G: 1 INJECTION, POWDER, FOR SOLUTION INTRAMUSCULAR; INTRAVENOUS at 08:44

## 2022-05-24 RX ADMIN — TIZANIDINE 4 MG: 4 TABLET ORAL at 21:19

## 2022-05-24 RX ADMIN — OLANZAPINE 2.5 MG: 2.5 TABLET, FILM COATED ORAL at 21:19

## 2022-05-24 RX ADMIN — BENZONATATE 200 MG: 100 CAPSULE ORAL at 04:29

## 2022-05-24 RX ADMIN — FLUOXETINE 20 MG: 20 CAPSULE ORAL at 08:43

## 2022-05-24 RX ADMIN — ACETAMINOPHEN 650 MG: 325 TABLET ORAL at 13:24

## 2022-05-24 RX ADMIN — SODIUM CHLORIDE, PRESERVATIVE FREE 10 ML: 5 INJECTION INTRAVENOUS at 21:25

## 2022-05-24 RX ADMIN — CLONAZEPAM 0.5 MG: 0.5 TABLET ORAL at 21:31

## 2022-05-24 RX ADMIN — HYDROCODONE POLISTIREX AND CHLORPHENIRAMINE POLISTIREX 5 ML: 10; 8 SUSPENSION, EXTENDED RELEASE ORAL at 15:39

## 2022-05-24 RX ADMIN — IPRATROPIUM BROMIDE AND ALBUTEROL SULFATE 3 ML: 2.5; .5 SOLUTION RESPIRATORY (INHALATION) at 07:57

## 2022-05-24 RX ADMIN — IPRATROPIUM BROMIDE AND ALBUTEROL SULFATE 3 ML: 2.5; .5 SOLUTION RESPIRATORY (INHALATION) at 21:38

## 2022-05-24 RX ADMIN — BENZONATATE 200 MG: 100 CAPSULE ORAL at 09:43

## 2022-05-24 RX ADMIN — IPRATROPIUM BROMIDE AND ALBUTEROL SULFATE 3 ML: 2.5; .5 SOLUTION RESPIRATORY (INHALATION) at 14:01

## 2022-05-24 RX ADMIN — LOSARTAN POTASSIUM 100 MG: 100 TABLET, FILM COATED ORAL at 08:44

## 2022-05-24 RX ADMIN — SODIUM CHLORIDE, PRESERVATIVE FREE 10 ML: 5 INJECTION INTRAVENOUS at 14:00

## 2022-05-24 RX ADMIN — DOXYCYCLINE 100 MG: 100 CAPSULE ORAL at 08:44

## 2022-05-24 RX ADMIN — ACETAMINOPHEN 650 MG: 325 TABLET ORAL at 21:19

## 2022-05-24 RX ADMIN — METFORMIN HYDROCHLORIDE 500 MG: 500 TABLET, EXTENDED RELEASE ORAL at 17:01

## 2022-05-24 RX ADMIN — ENOXAPARIN SODIUM 40 MG: 100 INJECTION SUBCUTANEOUS at 08:43

## 2022-05-24 RX ADMIN — LEVOTHYROXINE SODIUM 100 MCG: 0.1 TABLET ORAL at 06:44

## 2022-05-24 RX ADMIN — SPIRONOLACTONE 50 MG: 25 TABLET ORAL at 08:44

## 2022-05-24 RX ADMIN — DOXYCYCLINE 100 MG: 100 CAPSULE ORAL at 21:19

## 2022-05-24 RX ADMIN — ACETAMINOPHEN 650 MG: 325 TABLET ORAL at 04:29

## 2022-05-24 RX ADMIN — METHYLPREDNISOLONE SODIUM SUCCINATE 40 MG: 40 INJECTION, POWDER, FOR SOLUTION INTRAMUSCULAR; INTRAVENOUS at 21:20

## 2022-05-24 RX ADMIN — FAMOTIDINE 40 MG: 20 TABLET, FILM COATED ORAL at 08:43

## 2022-05-24 RX ADMIN — SODIUM CHLORIDE, PRESERVATIVE FREE 5 ML: 5 INJECTION INTRAVENOUS at 06:45

## 2022-05-24 NOTE — PROGRESS NOTES
Patient doing very well at this time. Answered all questions and concerns. Patient is progressing towards goal.

## 2022-05-24 NOTE — PROGRESS NOTES
Problem: Airway Clearance - Ineffective  Goal: Able to cough effectively  5/24/2022 0551 by Dylan Adrian, RT  Outcome: Progressing Towards Goal  5/24/2022 0550 by Dylan Adrian, RT  Outcome: Progressing Towards Goal  Goal: Absence of airway secretions  5/24/2022 0551 by Dylan Adrian, RT  Outcome: Progressing Towards Goal  5/24/2022 0550 by Dylan [place], RT  Outcome: Progressing Towards Goal  Goal: Lung sounds clear or within normal limits for patient  5/24/2022 0551 by Dylan Adrian, RT  Outcome: Progressing Towards Goal  5/24/2022 0550 by Dylan Adrian, RT  Outcome: Progressing Towards Goal     Problem: Bronchodilation Therapy  Goal: Able to breathe comfortably (Bronchodilation Therapy)  5/24/2022 0551 by Dylan Adrian, RT  Outcome: Progressing Towards Goal  5/24/2022 0550 by Dylan Adrian, RT  Outcome: Progressing Towards Goal     Problem: Oxygen/Respiratory Function  Goal: Adequate oxygenation  5/24/2022 0551 by Dylan Adrian, RT  Outcome: Progressing Towards Goal  5/24/2022 0550 by Dylan Adrian, RT  Outcome: Progressing Towards Goal

## 2022-05-24 NOTE — PROGRESS NOTES
Problem: Patient Education: Go to Patient Education Activity  Goal: Patient/Family Education  Outcome: Progressing Towards Goal     Problem: Pneumonia: Day 1  Goal: Off Pathway (Use only if patient is Off Pathway)  Outcome: Progressing Towards Goal  Goal: Activity/Safety  Outcome: Progressing Towards Goal  Goal: Consults, if ordered  Outcome: Progressing Towards Goal  Goal: Diagnostic Test/Procedures  Outcome: Progressing Towards Goal  Goal: Nutrition/Diet  Outcome: Progressing Towards Goal  Goal: Medications  Outcome: Progressing Towards Goal  Goal: Respiratory  Outcome: Progressing Towards Goal  Goal: Treatments/Interventions/Procedures  Outcome: Progressing Towards Goal  Goal: Psychosocial  Outcome: Progressing Towards Goal  Goal: *Oxygen saturation within defined limits  Outcome: Progressing Towards Goal  Goal: *Hemodynamically stable  Outcome: Progressing Towards Goal  Goal: *Demonstrates progressive activity  Outcome: Progressing Towards Goal  Goal: *Tolerating diet  Outcome: Progressing Towards Goal

## 2022-05-24 NOTE — PROGRESS NOTES
Bedside and Verbal shift change report given to Alverto Kimball RN (oncoming nurse) by Serena Figueroa LPN (offgoing nurse). Report included the following information SBAR, Kardex, Intake/Output and MAR.

## 2022-05-24 NOTE — PROGRESS NOTES
IDR Team; MD, Nursing, Care Manager, occupational therapy, Nursing Supervisor, Pharmacy and Dietician, met to review patient's plan of care. Discussed goals, interventions, barriers and progress. Team will continue to monitor progress and report any concerns to the physician and care management as indicated. Transition of Care Plan: Per MD, patient has new infiltrate in lung. RN said patient failed outpatient antibiotics. Has a cough.

## 2022-05-24 NOTE — PROGRESS NOTES
Spiritual Care Assessment/Progress Note  Juan Hebert      NAME: Asher Shwa      MRN: [id number]  AGE: 55 y.o.  SEX: female  Sabianist Affiliation: No preference   Language: English     5/24/2022     Total Time (in minutes): 11     Spiritual Assessment begun in [facility] MED[facility]SURG through conversation with:         [x]Patient        [x] Family    [] Friend(s)        Reason for Consult: Initial/Spiritual assessment, patient floor     Spiritual beliefs: (Please include comment if needed)     [x] Identifies with a shona tradition:         [] Supported by a shona community:            [] Claims no spiritual orientation:           [] Seeking spiritual identity:                [] Adheres to an individual form of spirituality:           [] Not able to assess:                           Identified resources for coping:      [] Prayer                               [] Music                  [] Guided Imagery     [x] Family/friends                 [] Pet visits     [] Devotional reading                         [] Unknown     [] Other:                                          Interventions offered during this visit: (See comments for more details)    Patient Interventions: Affirmation of emotions/emotional suffering,Affirmation of shona,Iconic (affirming the presence of God/Higher Power),Initial/Spiritual assessment, patient floor,Prayer (assurance of)           Plan of Care:     [x] Support spiritual and/or cultural needs    [] Support AMD and/or advance care planning process      [] Support grieving process   [] Coordinate Rites and/or Rituals    [] Coordination with community clergy   [] No spiritual needs identified at this time   [] Detailed Plan of Care below (See Comments)  [] Make referral to Music Therapy  [] Make referral to Pet Therapy     [] Make referral to Addiction services  [] Make referral to [facility]  [] Make referral to Spiritual Care Partner  [] No future visits requested        [] Contact Spiritual Care for further referrals     Comments: INITIAL SPIRITUAL ASSESSMENT in Rm 124 in Med/Surg

## 2022-05-24 NOTE — ROUTINE PROCESS
Bedside and Verbal shift change report given to JOSHUA Rivera RN (oncoming nurse) by Matilde Bergman RN (offgoing nurse). Report included the following information SBAR, MAR and Recent Results. Haney score 0  Bed/chair alarm is not in use. If in use it is set at the highest volume. SL  Patient Vitals for the past 12 hrs:   Temp Pulse Resp BP SpO2   05/24/22 1601 98.2 °F (36.8 °C) (!) 114 20 (!) 167/72 95 %   05/24/22 1404     92 %   05/24/22 0757     96 %   05/24/22 0735 97.5 °F (36.4 °C) 62 20 136/69 94 %     No flowsheet data found. All lab results for the last 24 hours reviewed.

## 2022-05-24 NOTE — PROGRESS NOTES
Pharmacy Medication Reconciliation     The patient was interviewed regarding current PTA medication list, use and drug allergies.  Jose Roberto Hernandez present in room and permission was obtained from patient to discuss drug regimen with visitor(s) present. Clarified PTA med list with patient. The patient was questioned regarding use of any:  inhalers, topical products, over the counter medications, herbal medications, vitamin products or ophthalmic/nasal/otic medication use. Allergies were verified. Allergy Update: latex, pitted fruits, almond, Cipro, Levaquin, Morphine, Penicillin, Vancomycin    Recommendations/Findings: The following amendments were made to the patient's active medication list on file at [facility]:   1) Additions: none    2) Deletions: none    3) Changes: Meloxicam, Spironolactone, Tizanidine    Counseling provided includes side effects/adverse drug reaction: yes    Drug Interactions and duplicate therapies include: N/A    PTA medication list was corrected to the following:     Prior to Admission Medications   Prescriptions Last Dose Informant Patient Reported? Taking? EPINEPHrine (EPIPEN) 0.3 mg/0.3 mL injection 5/23/2022 at Unknown time  Yes Yes   Sig: INJECT 0.3 ML INTO THE MUSCLE AS DIRECTED ONCE AS NEEDED FOR ALLERGIC RESPONSE. CALL HEALTHCARE PROVIDER OR EMERGENCY ROOM IMMEDIATELY   FLUoxetine (PROzac) 20 mg capsule 5/23/2022 at Unknown time  No Yes   Sig: Take 1 Capsule by mouth daily. OLANZapine (ZyPREXA) 2.5 mg tablet 5/22/2022 at Unknown time  No Yes   Sig: Take 1 Tablet by mouth nightly. albuterol (ProAir HFA) 90 mcg/actuation inhaler 5/23/2022 at Unknown time  No Yes   Sig: Take 2 Puffs by inhalation every four (4) hours as needed for Wheezing or Shortness of Breath. benzonatate (Tessalon Perles) 100 mg capsule 5/23/2022 at Unknown time  No Yes   Sig: Take 2 Capsules by mouth three (3) times daily as needed for Cough for up to 7 days.    clonazePAM (KlonoPIN) 0.5 mg tablet 5/23/2022 at Unknown time  No Yes   Sig: Take 1 Tablet by mouth daily as needed (Anxiety). doxycycline (VIBRA-TABS) 100 mg tablet 5/23/2022 at Unknown time  No Yes   Sig: Take 1 Tablet by mouth two (2) times a day for 7 days. famotidine (Pepcid) 40 mg tablet 5/23/2022 at Unknown time  Yes Yes   Sig: Take 40 mg by mouth daily. hydroxychloroquine (Plaquenil) 200 mg tablet 5/23/2022 at Unknown time  Yes Yes   Sig: Take 200 mg by mouth two (2) times a day. levothyroxine (SYNTHROID) 100 mcg tablet 5/23/2022 at Unknown time  No Yes   Sig: TAKE 1 TABLET DAILY ONE HOUR PRIOR TO BREAKFAST OR 2-3 HRS AFTER DINNER   losartan (COZAAR) 100 mg tablet 5/23/2022 at Unknown time  No Yes   Sig: TAKE 1 TABLET BY MOUTH EVERY DAY   meloxicam (MOBIC) 7.5 mg tablet 5/23/2022 at Unknown time  Yes Yes   Sig: Take 7.5 mg by mouth daily as needed. metFORMIN ER (GLUCOPHAGE XR) 500 mg tablet 5/23/2022 at Unknown time  No Yes   Sig: TAKE 1 TABLET BY MOUTH ONCE DAILY WITH DINNER   rosuvastatin (CRESTOR) 10 mg tablet 5/23/2022 at Unknown time  No Yes   Sig: TAKE 1 TABLET BY MOUTH DAILY   spironolactone (ALDACTONE) 25 mg tablet 5/23/2022 at Unknown time  No Yes   Sig: Take 2 Tablets by mouth daily. Patient taking differently: Take 25 mg by mouth daily. tiZANidine (ZANAFLEX) 6 mg capsule 5/23/2022 at Unknown time  Yes Yes   Sig: Take 6 mg by mouth nightly.       Facility-Administered Medications: None        Thank you,  Francisco Barros, PHARMD

## 2022-05-24 NOTE — PROGRESS NOTES
Spiritual Care Assessment/Progress Note  Juan Hebert      NAME: Brayden Patel      MRN: [id number]  AGE: 55 y.o.  SEX: female  Faith Affiliation: No preference   Language: English     5/24/2022     Total Time (in minutes): 11     Spiritual Assessment begun in [facility] MED/SURG through conversation with:         [x]Patient        [] Family    [] Friend(s)        Reason for Consult: Initial/Spiritual assessment, patient floor     Spiritual beliefs: (Please include comment if needed)     [x] Identifies with a shona tradition:         [] Supported by a shona community:            [] Claims no spiritual orientation:           [] Seeking spiritual identity:                [] Adheres to an individual form of spirituality:           [] Not able to assess:                           Identified resources for coping:      [] Prayer                               [] Music                  [] Guided Imagery     [x] Family/friends                 [] Pet visits     [] Devotional reading                         [] Unknown     [] Other:                                             Interventions offered during this visit: (See comments for more details)    Patient Interventions: Affirmation of emotions/emotional suffering,Affirmation of shona,Iconic (affirming the presence of God/Higher Power),Initial/Spiritual assessment, patient floor,Prayer (assurance of)           Plan of Care:     [x] Support spiritual and/or cultural needs    [] Support AMD and/or advance care planning process      [] Support grieving process   [] Coordinate Rites and/or Rituals    [] Coordination with community clergy   [] No spiritual needs identified at this time   [] Detailed Plan of Care below (See Comments)  [] Make referral to Music Therapy  [] Make referral to Pet Therapy     [] Make referral to Addiction services  [] Make referral to [facility]  [] Make referral to Spiritual Care Partner  [] No future visits requested        [] Contact Spiritual Care for further referrals     Comments: Initial spiritual assessment in Rm 124 in Med/Surg  Patient and  were present during the visit. 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   Provided empathic listening and spiritual support   Advised of  Availability   [address] [place] [address]

## 2022-05-25 ENCOUNTER — APPOINTMENT (OUTPATIENT)
Dept: GENERAL RADIOLOGY | Age: 57
DRG: 195 | End: 2022-05-25
Attending: INTERNAL MEDICINE
Payer: COMMERCIAL

## 2022-05-25 PROCEDURE — 74011250636 HC RX REV CODE- 250/636: Performed by: INTERNAL MEDICINE

## 2022-05-25 PROCEDURE — 94640 AIRWAY INHALATION TREATMENT: CPT

## 2022-05-25 PROCEDURE — 71046 X-RAY EXAM CHEST 2 VIEWS: CPT

## 2022-05-25 PROCEDURE — 74011000258 HC RX REV CODE- 258: Performed by: INTERNAL MEDICINE

## 2022-05-25 PROCEDURE — 94761 N-INVAS EAR/PLS OXIMETRY MLT: CPT

## 2022-05-25 PROCEDURE — 65270000029 HC RM PRIVATE

## 2022-05-25 PROCEDURE — 74011250637 HC RX REV CODE- 250/637: Performed by: INTERNAL MEDICINE

## 2022-05-25 PROCEDURE — 74011000250 HC RX REV CODE- 250: Performed by: INTERNAL MEDICINE

## 2022-05-25 RX ADMIN — DOXYCYCLINE 100 MG: 100 CAPSULE ORAL at 21:19

## 2022-05-25 RX ADMIN — OLANZAPINE 2.5 MG: 2.5 TABLET, FILM COATED ORAL at 21:19

## 2022-05-25 RX ADMIN — IPRATROPIUM BROMIDE AND ALBUTEROL SULFATE 3 ML: 2.5; .5 SOLUTION RESPIRATORY (INHALATION) at 07:42

## 2022-05-25 RX ADMIN — SODIUM CHLORIDE, PRESERVATIVE FREE 10 ML: 5 INJECTION INTRAVENOUS at 06:41

## 2022-05-25 RX ADMIN — FLUOXETINE 20 MG: 20 CAPSULE ORAL at 09:23

## 2022-05-25 RX ADMIN — SODIUM CHLORIDE, PRESERVATIVE FREE 10 ML: 5 INJECTION INTRAVENOUS at 16:38

## 2022-05-25 RX ADMIN — TIZANIDINE 4 MG: 4 TABLET ORAL at 21:19

## 2022-05-25 RX ADMIN — CEFTRIAXONE SODIUM 1 G: 1 INJECTION, POWDER, FOR SOLUTION INTRAMUSCULAR; INTRAVENOUS at 09:24

## 2022-05-25 RX ADMIN — IPRATROPIUM BROMIDE AND ALBUTEROL SULFATE 3 ML: 2.5; .5 SOLUTION RESPIRATORY (INHALATION) at 20:51

## 2022-05-25 RX ADMIN — HYDROCODONE POLISTIREX AND CHLORPHENIRAMINE POLISTIREX 5 ML: 10; 8 SUSPENSION, EXTENDED RELEASE ORAL at 16:46

## 2022-05-25 RX ADMIN — METHYLPREDNISOLONE SODIUM SUCCINATE 40 MG: 40 INJECTION, POWDER, FOR SOLUTION INTRAMUSCULAR; INTRAVENOUS at 21:20

## 2022-05-25 RX ADMIN — DOXYCYCLINE 100 MG: 100 CAPSULE ORAL at 09:24

## 2022-05-25 RX ADMIN — METHYLPREDNISOLONE SODIUM SUCCINATE 40 MG: 40 INJECTION, POWDER, FOR SOLUTION INTRAMUSCULAR; INTRAVENOUS at 16:38

## 2022-05-25 RX ADMIN — IPRATROPIUM BROMIDE AND ALBUTEROL SULFATE 3 ML: 2.5; .5 SOLUTION RESPIRATORY (INHALATION) at 13:12

## 2022-05-25 RX ADMIN — ENOXAPARIN SODIUM 40 MG: 100 INJECTION SUBCUTANEOUS at 09:23

## 2022-05-25 RX ADMIN — CLONAZEPAM 0.5 MG: 0.5 TABLET ORAL at 21:20

## 2022-05-25 RX ADMIN — LEVOTHYROXINE SODIUM 100 MCG: 0.1 TABLET ORAL at 06:42

## 2022-05-25 RX ADMIN — METFORMIN HYDROCHLORIDE 500 MG: 500 TABLET, EXTENDED RELEASE ORAL at 16:38

## 2022-05-25 RX ADMIN — HYDROCODONE POLISTIREX AND CHLORPHENIRAMINE POLISTIREX 5 ML: 10; 8 SUSPENSION, EXTENDED RELEASE ORAL at 05:17

## 2022-05-25 RX ADMIN — LOSARTAN POTASSIUM 100 MG: 100 TABLET, FILM COATED ORAL at 09:23

## 2022-05-25 RX ADMIN — METHYLPREDNISOLONE SODIUM SUCCINATE 40 MG: 40 INJECTION, POWDER, FOR SOLUTION INTRAMUSCULAR; INTRAVENOUS at 06:42

## 2022-05-25 RX ADMIN — SODIUM CHLORIDE, PRESERVATIVE FREE 10 ML: 5 INJECTION INTRAVENOUS at 21:21

## 2022-05-25 RX ADMIN — FAMOTIDINE 40 MG: 20 TABLET, FILM COATED ORAL at 09:23

## 2022-05-25 RX ADMIN — SPIRONOLACTONE 50 MG: 25 TABLET ORAL at 09:24

## 2022-05-25 NOTE — PROGRESS NOTES
IDR Team; MD, Care Manager, , Pharmacy and Dietician, met to review patient's plan of care. Discussed goals, interventions, barriers and progress. Team will continue to monitor progress and report any concerns to the physician and care management as indicated. Transition of Care Plan: Per MD, infiltrate on right side. Repeat xray today. Dc possible for tomorrow.

## 2022-05-25 NOTE — PROGRESS NOTES
Problem: Patient Education: Go to Patient Education Activity  Goal: Patient/Family Education  Outcome: Progressing Towards Goal     Problem: Pneumonia: Day 1  Goal: Respiratory  Outcome: Progressing Towards Goal     Problem: Patient Education: Go to Patient Education Activity  Goal: Patient/Family Education  Outcome: Progressing Towards Goal     Problem: Pneumonia: Day 1  Goal: Respiratory  Outcome: Progressing Towards Goal

## 2022-05-25 NOTE — RT PROTOCOL NOTE
RT Nebulizer Bronchodilator Protocol Note    There is a bronchodilator order in the chart from a provider indicating to follow the RT Bronchodilator Protocol and there is an Initiate RT Bronchodilator Protocol order as well (see protocol at bottom of note). CXR Findings:  Recent Results (from the past 2 days)    XR CHEST PA LAT 05/23/2022    Impression  Findings suggestive of developing right upper lobe infiltration. The findings from the last RT Protocol Assessment were as follows:  History of Pulmonary Disease: None or smoker <15 pack years  Respiratory Pattern: Regular pattern and RR 12-20 bpm  Breath Sounds: Intermittent or unilateral wheezes  Cough: Strong, spontaneous, non-productive  Indication for Bronchodilator Therapy: None  Bronchodilator Assessment Score: 4     Aerosolized bronchodilator medication orders have been revised according to the RT Nebulizer Bronchodilator Protocol below. Respiratory Therapist to perform RT Therapy Protocol Assessment initially then follow the protocol. Repeat RT Therapy Protocol Assessment PRN for score 0-3 or on second treatment, BID, and PRN for scores above 3. No Indications  adjust the frequency to every 6 hours PRN wheezing or bronchospasm, if no treatments needed after 48 hours then discontinue using Per Protocol order mode. If indication present, adjust the RT bronchodilator orders based on the Bronchodilator Assessment Score as indicated below. If a patient is on this medication at home then do not decrease Frequency below that used at home. 0-3  enter or revise RT bronchodilator order(s) to equivalent RT Bronchodilator order with Frequency of every 4 hours PRN for wheezing or increased work of breathing using Per Protocol order mode.         4-6  enter or revise RT Bronchodilator order(s) to two equivalent RT bronchodilator orders with one order with BID Frequency and one order with Frequency of every 4 hours PRN wheezing or increased work of breathing using Per Protocol order mode. 7-10  enter or revise RT Bronchodilator order(s) to two equivalent RT bronchodilator orders with one order with TID Frequency and one order with Frequency of every 4 hours PRN wheezing or increased work of breathing using Per Protocol order mode. 11-13  enter or revise RT Bronchodilator order(s) to one equivalent RT bronchodilator order with QID Frequency and an Albuterol order with Frequency of every 4 hours PRN wheezing or increased work of breathing using Per Protocol order mode. Greater than 13  enter or revise RT Bronchodilator order(s) to one equivalent RT bronchodilator order with every 4 hours Frequency and an Albuterol order with Frequency of every 2 hours PRN wheezing or increased work of breathing using Per Protocol order mode. RT to enter RT Home Evaluation for COPD & MDI Assessment order using Per Protocol order mode.     Electronically signed by RT Trae on 5/24/2022 at 9:46 PM

## 2022-05-25 NOTE — PROGRESS NOTES
Problem: Patient Education: Go to Patient Education Activity  Goal: Patient/Family Education  Outcome: Progressing Towards Goal     Problem: Pneumonia: Day 1  Goal: Respiratory  Outcome: Progressing Towards Goal     Problem: Hypertension  Goal: *Blood pressure within specified parameters  Outcome: Progressing Towards Goal

## 2022-05-25 NOTE — PROGRESS NOTES
[facility]  Hospitalist Progress Note    NAME: Wero Moore   :  1965   MRN:  [id number]     Total duration of encounter: 2 days      Interim Hospital Summary: 55 y.o. female who presented on 2022 with Community acquired pneumonia of right upper lobe of lung. She has a past medical history of Depression, Diabetes ([facility] [address] Fibromyalgia, Grade 2 ankle sprain (2018), Hypertension, Lupus (systemic lupus erythematosus) ([facility] [address] and Menopause. She has no past medical history of Endocrine disorder. .     Pt admitted with one week h/o worsening cough, wheezing, SOB failing to respond to outpt tx. Has been tx with Azithromycin / Doxycycline / Medrol dose pack. Also has home nebulizer        Subjective:     Chief Complaint / Reason for Physician Visit  \"some improvement\".   Discussed with RN   Less tight  Some anxiety / tachycardia a/w tx  Did not sleep well, somewhat due to nagging cough  Prefers Tussionex over Tessalon perls    Review of Systems:  Symptom Y/N Comments  Symptom Y/N Comments   Fever/Chills n   Chest Pain n    Poor Appetite n   Edema n    Cough y   Abdominal Pain n    Sputum y   Joint Pain n    SOB/RAHMAN y   Pruritis/Rash n    Nausea/vomit n   Tolerating PT/OT     Diarrhea n   Tolerating Diet y    Constipation n   Other         Current Facility-Administered Medications:     benzonatate (TESSALON) capsule 200 mg, 200 mg, Oral, TID, Meir Leggett MD    HYDROcodone-chlorpheniramine (TUSSIONEX) oral suspension 5 mL, 5 mL, Oral, Q12H PRN, Meir Leggett MD, 5 mL at 22 0517    methylPREDNISolone (PF) (SOLU-MEDROL) injection 40 mg, 40 mg, IntraVENous, Q8H, Meir Leggett MD, 40 mg at 22 5285    sodium chloride (NS) flush 5-10 mL, 5-10 mL, IntraVENous, PRN, Meir Leggett MD    famotidine (PEPCID) tablet 40 mg, 40 mg, Oral, DAILY, Meir Leggett MD, 40 mg at 22 0843    FLUoxetine (PROzac) capsule 20 mg, 20 mg, Oral, DAILY, Meir Leggett MD, 20 mg at 05/24/22 0843    levothyroxine (SYNTHROID) tablet 100 mcg, 100 mcg, Oral, ACB, Karen Chery MD, 100 mcg at 05/25/22 2220    losartan (COZAAR) tablet 100 mg, 100 mg, Oral, DAILY, Karen Chery MD, 100 mg at 05/24/22 0844    meloxicam (MOBIC) tablet 7.5 mg, 7.5 mg, Oral, DAILY PRN, Karen Chery MD    metFORMIN ER (GLUCOPHAGE XR) tablet 500 mg, 500 mg, Oral, DAILY WITH DINNER, Karen Chery MD, 500 mg at 05/24/22 1701    OLANZapine (ZyPREXA) tablet 2.5 mg, 2.5 mg, Oral, QHS, Karen Chery MD, 2.5 mg at 05/24/22 2119    spironolactone (ALDACTONE) tablet 50 mg, 50 mg, Oral, DAILY, Karen Chery MD, 50 mg at 05/24/22 0844    sodium chloride (NS) flush 5-40 mL, 5-40 mL, IntraVENous, Q8H, Karen Chery MD, 10 mL at 05/25/22 0641    sodium chloride (NS) flush 5-40 mL, 5-40 mL, IntraVENous, PRN, Karen Chery MD    acetaminophen (TYLENOL) tablet 650 mg, 650 mg, Oral, Q6H PRN, 650 mg at 05/24/22 2119 **OR** acetaminophen (TYLENOL) suppository 650 mg, 650 mg, Rectal, Q6H PRN, Karen Chery MD    polyethylene glycol (MIRALAX) packet 17 g, 17 g, Oral, DAILY PRN, Karen Chery MD    enoxaparin (LOVENOX) injection 40 mg, 40 mg, SubCUTAneous, DAILY, Karen Chery MD, 40 mg at 05/24/22 0843    cefTRIAXone (ROCEPHIN) 1 g in 0.9% sodium chloride (MBP/ADV) 50 mL MBP, 1 g, IntraVENous, Q24H, Karen Chery MD, IV Completed at 05/24/22 0914    doxycycline (MONODOX) capsule 100 mg, 100 mg, Oral, Q12H, Karen Chery MD, 100 mg at 05/24/22 2119    clonazePAM (KlonoPIN) tablet 0.5 mg, 0.5 mg, Oral, QHS PRN, Karen Chery MD, 0.5 mg at 05/24/22 2131    tiZANidine (ZANAFLEX) tablet 4 mg, 4 mg, Oral, QHS, Karen Chery MD, 4 mg at 05/24/22 2119    albuterol-ipratropium (DUO-NEB) 2.5 MG-0.5 MG/3 ML, 3 mL, Nebulization, TID, Karen Chery MD, 3 mL at 05/25/22 0742    Objective:     VITALS:   Patient Vitals for the past 12 hrs:   Temp Pulse Resp BP SpO2   05/25/22 0742     92 %   05/24/22 2334 98.3 °F (36.8 °C) 79 20 101/69 94 %   05/24/22 2139     96 %       Intake/Output Summary (Last 24 hours) at 5/25/2022 0748  Last data filed at 5/24/2022 0903  Gross per 24 hour   Intake 360 ml   Output    Net 360 ml        PHYSICAL EXAM:  General: WD, WN. Alert, cooperative, no acute distress    EENT:  EOMI. Anicteric sclerae. MMM  Resp:  Reduced excursion, B wheeze, scant rales. No accessory muscle use  CV:  Regular  rhythm,  No edema  GI:  Soft, Non distended, Non tender. +Bowel sounds  Neurologic:  Alert and oriented X 3, normal speech, nonfocal  Psych: Moderately anxious  Skin:  No rashes. No jaundice    LABS:  I reviewed today's most current labs and imaging studies. Pertinent labs include:  Recent Labs     05/24/22  0539 05/23/22  1226   WBC 11.3* 20.5*   HGB 12.4 14.4   HCT 37.4 43.0    369     Recent Labs     05/24/22  0539 05/23/22  1226    142   K 3.3* 3.4*    104   CO2 27 24   GLU 84 102*   BUN 16 21*   CREA 0.86 0.98   CA 8.7 10.0   MG 1.7  --    ALB  --  4.0   TBILI  --  0.5   ALT  --  62     CULTURES  Paired BC 5/23: NG x 2d  (machine flagges, GmSt however neg)  Sputum 5/23:  Rare wbc, rare Epi, few GPC clusters - FINAL: Mod Normal Respiratory Bhavana      EKG: SB 55 bpm, PRWP, [address]      Radiology:  CXR PA/LAT 5/20:  Frontal and lateral views of the chest show clear lungs. The heart, mediastinum  and pulmonary vasculature are normal.  The bony thorax is unremarkable.   IMPRESSION: NORMAL CHEST    CXR PA/LAT 5/23:  PA and lateral views of the chest were obtained. The cardiac silhouette is  normal in size. A somewhat focal area of hazy density is noted in the region of  the right upper lobe. This may represent developing infiltration. A follow-up  chest examination is recommended. There is evidence of thoracic spondylosis.   IMPRESSION: Findings suggestive of developing right upper lobe infiltration    CXR PA/LAT 5/25:   PA and lateral views of the chest were obtained.  There has been very slight  improvement of the aeration of the right upper lobe. Specifically, the  previously described right upper lobe infiltrate is less conspicuous. A  follow-up chest examination is recommended.   IMPRESSION: Interval improvement of the previously described right upper lobe infiltrate. Procedures: see electronic medical records for all procedures/Xrays and details which were not copied into this note but were reviewed prior to creation of Plan. Assessment / Plan:    Principal Problem:    Community acquired pneumonia of right upper lobe of lung (5/23/2022)  Active Problems:    Asplenia (12/27/2018)      Overview: After horse riding accident  Patient admitted due to failure of outpatient treatment  New infiltrate documented in the right upper lung fields  Has already completed 5-day course of Azithromycin beginning 5/17 and Medrol 6d pack. Also was started on Doxycycline 100 mg twice daily beginning 5/20  Monitor oxygenation  DuoNeb by nebulizer 4 times daily  Paired blood culture, sputum for Gram stain and culture  Empiric treatment with Rocephin 1 g daily x7 days, doxycycline 100 mg twice daily orally x7 days  Add Solumedrol.   Add Tussionex bid       Lupus (systemic lupus erythematosus) ([facility] [address] (10/17/2018)  Patient notes a diagnosis of lupus versus fibromyalgia  Patient's med rec noted she is not using Plaquenil  Maintain Zanaflex and Klonopin at bedtime  Continue Mobic 7.5 mg daily as needed       Controlled type 2 diabetes mellitus with complication, without long-term current use of insulin ([facility] [address] (10/19/2018)  Monitor fasting glucose daily  Maintain Metformin 500 mg daily with supper  FBS 84 this AM  Follow on Solumedrol 40mg   Results for Sylvia Vega (MRN [id number]) as of 5/25/2022 21:42   5/20/2022 18:00 5/23/2022 12:26 5/24/2022 05:39   Glucose 110 (H) 102 (H) 84       Hypertension (10/19/2018)  Continue home regiment of Cozaar 100 mg daily, Spironolactone 25 mg daily       Pure hyperglyceridemia (10/21/2018)  Resume Crestor on discharge       Acquired hypothyroidism (5/23/2022)  Maintain Levothyroxine  100 mcg daily        SAFETY:   Code Status:Full  DVT prophylaxis:Lovenox  Stress Ulcer prophylaxis: Pepcid  Bladder catheter:no  Family Contact Info:  Primary Emergency Contact: Elvis White Phone: [phone]  Bedded: [facility] Room 124/01  Disposition: TBD, likely home when stable  Admission status:  Inpatient       Reviewed most current lab test results and cultures  YES  Reviewed most current radiology test results   YES  Review and summation of old records today    NO  Reviewed patient's current orders and MAR    YES  PMH/SH reviewed - no change compared to H&P    Care Plan discussed with:                                   Comments  Patient x     Family      RN x     Care Manager  x     Consultant                          x Multidiciplinary team rounds were held today with , nursing, pharmacist and clinical coordinator. Patient's plan of care was discussed; medications were reviewed and discharge planning was addressed.         ____________________________________________    Total NON Critical Care TIME:  30   Minutes        Comments   >50% of visit spent in counseling and coordination of care x      Signed: Conda Fabry, MD  [facility] Hospitalist  [phone]

## 2022-05-25 NOTE — PROGRESS NOTES
[facility]  Hospitalist Progress Note    NAME: Laura Loco   :  1965   MRN:  [id number]     Total duration of encounter: 1 day      Interim Hospital Summary: 55 y.o. female who presented on 2022 with Community acquired pneumonia of right upper lobe of lung. She has a past medical history of Depression, Diabetes ([facility] [address] Fibromyalgia, Grade 2 ankle sprain (2018), Hypertension, Lupus (systemic lupus erythematosus) ([facility] [address] and Menopause. She has no past medical history of Endocrine disorder. .     Pt admitted with one week h/o worsening cough, wheezing, SOB failing to respond to outpt tx. Has been tx with Azithromycin / Doxycycline / Medrol dose pack. Also has home nebulizer        Subjective:     Chief Complaint / Reason for Physician Visit  \"some improvement\".   Discussed with RN   Less tight  Some anxiety / tachycardia a/w tx  Did not sleep well, somewhat due to nagging cough    Review of Systems:  Symptom Y/N Comments  Symptom Y/N Comments   Fever/Chills n   Chest Pain n    Poor Appetite n   Edema n    Cough y   Abdominal Pain n    Sputum y   Joint Pain n    SOB/RAHMAN y   Pruritis/Rash n    Nausea/vomit n   Tolerating PT/OT     Diarrhea n   Tolerating Diet y    Constipation n   Other         Current Facility-Administered Medications:     benzonatate (TESSALON) capsule 200 mg, 200 mg, Oral, TID, Latoya Shahid MD    HYDROcodone-chlorpheniramine (TUSSIONEX) oral suspension 5 mL, 5 mL, Oral, Q12H PRN, Latoya Shahid MD, 5 mL at 22 1539    sodium chloride (NS) flush 5-10 mL, 5-10 mL, IntraVENous, PRN, Latoya Shahid MD    famotidine (PEPCID) tablet 40 mg, 40 mg, Oral, DAILY, Latoya Shahid MD, 40 mg at 22 0843    FLUoxetine (PROzac) capsule 20 mg, 20 mg, Oral, DAILY, Latoya Shahid MD, 20 mg at 22 1816    levothyroxine (SYNTHROID) tablet 100 mcg, 100 mcg, Oral, ACB, Latoya Shahid MD, 100 mcg at 22 0644    losartan (COZAAR) tablet 100 mg, 100 mg, Oral, DAILY, Yvette Garcia MD, 100 mg at 05/24/22 6653    meloxicam (MOBIC) tablet 7.5 mg, 7.5 mg, Oral, DAILY PRN, Yvette Garcia MD    metFORMIN ER (GLUCOPHAGE XR) tablet 500 mg, 500 mg, Oral, DAILY WITH DINNER, Yvette Garcia MD, 500 mg at 05/24/22 1701    OLANZapine (ZyPREXA) tablet 2.5 mg, 2.5 mg, Oral, QHS, Yvette Garcia MD, 2.5 mg at 05/23/22 2135    spironolactone (ALDACTONE) tablet 50 mg, 50 mg, Oral, DAILY, Yvette Garcia MD, 50 mg at 05/24/22 0844    sodium chloride (NS) flush 5-40 mL, 5-40 mL, IntraVENous, Q8H, Yvette Garcia MD, 10 mL at 05/24/22 1400    sodium chloride (NS) flush 5-40 mL, 5-40 mL, IntraVENous, PRN, Yvette Garcia MD    acetaminophen (TYLENOL) tablet 650 mg, 650 mg, Oral, Q6H PRN, 650 mg at 05/24/22 1324 **OR** acetaminophen (TYLENOL) suppository 650 mg, 650 mg, Rectal, Q6H PRN, Yvette Garcia MD    polyethylene glycol (MIRALAX) packet 17 g, 17 g, Oral, DAILY PRN, Yvette Garcia MD    enoxaparin (LOVENOX) injection 40 mg, 40 mg, SubCUTAneous, DAILY, Yvette Garcia MD, 40 mg at 05/24/22 0843    cefTRIAXone (ROCEPHIN) 1 g in 0.9% sodium chloride (MBP/ADV) 50 mL MBP, 1 g, IntraVENous, Q24H, Yvette Garcia MD, IV Completed at 05/24/22 0914    doxycycline (MONODOX) capsule 100 mg, 100 mg, Oral, Q12H, Yvette Garcia MD, 100 mg at 05/24/22 0844    clonazePAM (KlonoPIN) tablet 0.5 mg, 0.5 mg, Oral, QHS PRN, Yvette Garcia MD, 0.5 mg at 05/23/22 2142    tiZANidine (ZANAFLEX) tablet 4 mg, 4 mg, Oral, QHS, Yvette Garcia MD, 4 mg at 05/23/22 2135    albuterol-ipratropium (DUO-NEB) 2.5 MG-0.5 MG/3 ML, 3 mL, Nebulization, TID, Yvette Garcia MD, 3 mL at 05/24/22 1401    Objective:     VITALS:   Patient Vitals for the past 12 hrs:   Temp Pulse Resp BP SpO2   05/24/22 1601 98.2 °F (36.8 °C) (!) 114 20 (!) 167/72 95 %   05/24/22 1404     92 %       Intake/Output Summary (Last 24 hours) at 5/24/2022 2011  Last data filed at 5/24/2022 0903  Gross per 24 hour   Intake 360 ml   Output    Net 360 ml        PHYSICAL EXAM:  General: WD, WN. Alert, cooperative, no acute distress    EENT:  EOMI. Anicteric sclerae. MMM  Resp:  Reduced excursion, B wheeze, scant rales. No accessory muscle use  CV:  Regular  rhythm,  No edema  GI:  Soft, Non distended, Non tender. +Bowel sounds  Neurologic:  Alert and oriented X 3, normal speech, nonfocal  Psych: Moderately anxious  Skin:  No rashes. No jaundice    LABS:  I reviewed today's most current labs and imaging studies. Pertinent labs include:  Recent Labs     05/24/22  0539 05/23/22  1226   WBC 11.3* 20.5*   HGB 12.4 14.4   HCT 37.4 43.0    369     Recent Labs     05/24/22  0539 05/23/22  1226    142   K 3.3* 3.4*    104   CO2 27 24   GLU 84 102*   BUN 16 21*   CREA 0.86 0.98   CA 8.7 10.0   MG 1.7  --    ALB  --  4.0   TBILI  --  0.5   ALT  --  62     EKG: SB 55 bpm, PRWP, [address]      Radiology:  XR CHEST PA LAT   Final Result   Findings suggestive of developing right upper lobe infiltration       Procedures: see electronic medical records for all procedures/Xrays and details which were not copied into this note but were reviewed prior to creation of Plan. Assessment / Plan:    Principal Problem:    Community acquired pneumonia of right upper lobe of lung (5/23/2022)  Active Problems:    Asplenia (12/27/2018)      Overview: After horse riding accident  Patient admitted due to failure of outpatient treatment  New infiltrate documented in the right upper lung fields  Has already completed 5-day course of Azithromycin beginning 5/17 and Medrol 6d pack. Also was started on Doxycycline 100 mg twice daily beginning 5/20  Monitor oxygenation  DuoNeb by nebulizer 4 times daily  Paired blood culture, sputum for Gram stain and culture  Empiric treatment with Rocephin 1 g daily x7 days, doxycycline 100 mg twice daily orally x7 days  Add Solumedrol.   Add Tussionex bid       Lupus (systemic lupus erythematosus) ([facility] [address] (10/17/2018)  Patient notes a diagnosis of lupus versus fibromyalgia  Patient's med rec noted she is not using Plaquenil  Maintain Zanaflex and Klonopin at bedtime  Continue Mobic 7.5 mg daily as needed       Controlled type 2 diabetes mellitus with complication, without long-term current use of insulin (HCC) (10/19/2018)  Monitor fasting glucose daily  Maintain Metformin 500 mg daily with supper  FBS 84 this AM  Follow on Solumedrol 40mg       Hypertension (10/19/2018)  Continue home regiment of Cozaar 100 mg daily, Spironolactone 25 mg daily       Pure hyperglyceridemia (10/21/2018)  Resume Crestor on discharge       Acquired hypothyroidism (5/23/2022)  Maintain Levothyroxine  100 mcg daily        SAFETY:   Code Status:Full  DVT prophylaxis:Lovenox  Stress Ulcer prophylaxis: Pepcid  Bladder catheter:no  Family Contact Info:  Primary Emergency Contact: [address], Home Phone: [phone]  Bedded: [facility] Room 124/01  Disposition: TBD, likely home when stable  Admission status:  Inpatient       Reviewed most current lab test results and cultures  YES  Reviewed most current radiology test results   YES  Review and summation of old records today    NO  Reviewed patient's current orders and MAR    YES  PMH/SH reviewed - no change compared to H&P    Care Plan discussed with:                                   Comments  Patient x     Family      RN x     Care Manager  x     Consultant                          x Multidiciplinary team rounds were held today with , nursing, pharmacist and clinical coordinator. Patient's plan of care was discussed; medications were reviewed and discharge planning was addressed.         ____________________________________________    Total NON Critical Care TIME:  35   Minutes        Comments   >50% of visit spent in counseling and coordination of care x      Signed: Mya Ma MD  [facility] Hospitalist  [phone]

## 2022-05-25 NOTE — PROGRESS NOTES
Follow up visit with patient in  124 in  Med/Surg  Patient was alone in the room during the visit  Provided empathic listening and spiritual support  Advised of  Availability   [address] [place] [address]

## 2022-05-25 NOTE — PROGRESS NOTES
Bedside shift change report given to Audra (oncoming nurse) by Digna (offgoing nurse). Report included the following information SBAR, Kardex and MAR.

## 2022-05-26 LAB
ANION GAP SERPL CALC-SCNC: 15 MMOL/L (ref 5–15)
BACTERIA SPEC CULT: NORMAL
BASOPHILS # BLD: 0 K/UL (ref 0–0.1)
BASOPHILS NFR BLD: 0 % (ref 0–1)
BUN SERPL-MCNC: 20 MG/DL (ref 6–20)
BUN/CREAT SERPL: 21 (ref 12–20)
CALCIUM SERPL-MCNC: 9.8 MG/DL (ref 8.5–10.1)
CHLORIDE SERPL-SCNC: 104 MMOL/L (ref 97–108)
CO2 SERPL-SCNC: 22 MMOL/L (ref 21–32)
CREAT SERPL-MCNC: 0.95 MG/DL (ref 0.55–1.02)
DIFFERENTIAL METHOD BLD: ABNORMAL
EOSINOPHIL # BLD: 0 K/UL (ref 0–0.4)
EOSINOPHIL NFR BLD: 0 % (ref 0–7)
ERYTHROCYTE [DISTWIDTH] IN BLOOD BY AUTOMATED COUNT: 15.5 % (ref 11.5–14.5)
GLUCOSE SERPL-MCNC: 136 MG/DL (ref 65–100)
GRAM STN SPEC: NORMAL
HCT VFR BLD AUTO: 39.7 % (ref 35–47)
HGB BLD-MCNC: 13.1 G/DL (ref 11.5–16)
IMM GRANULOCYTES # BLD AUTO: 0.2 K/UL (ref 0–0.04)
IMM GRANULOCYTES NFR BLD AUTO: 1 % (ref 0–0.5)
LYMPHOCYTES # BLD: 2.2 K/UL (ref 0.8–3.5)
LYMPHOCYTES NFR BLD: 9 % (ref 12–49)
MCH RBC QN AUTO: 30.1 PG (ref 26–34)
MCHC RBC AUTO-ENTMCNC: 33 G/DL (ref 30–36.5)
MCV RBC AUTO: 91.3 FL (ref 80–99)
MONOCYTES # BLD: 1 K/UL (ref 0–1)
MONOCYTES NFR BLD: 4 % (ref 5–13)
NEUTS SEG # BLD: 20.9 K/UL (ref 1.8–8)
NEUTS SEG NFR BLD: 86 % (ref 32–75)
NRBC # BLD: 0 K/UL (ref 0–0.01)
NRBC BLD-RTO: 0 PER 100 WBC
PLATELET # BLD AUTO: 203 K/UL (ref 150–400)
PMV BLD AUTO: 11.4 FL (ref 8.9–12.9)
POTASSIUM SERPL-SCNC: 4.1 MMOL/L (ref 3.5–5.1)
RBC # BLD AUTO: 4.35 M/UL (ref 3.8–5.2)
RBC MORPH BLD: ABNORMAL
SERVICE CMNT-IMP: NORMAL
SODIUM SERPL-SCNC: 141 MMOL/L (ref 136–145)
WBC # BLD AUTO: 24.3 K/UL (ref 3.6–11)

## 2022-05-26 PROCEDURE — 74011250636 HC RX REV CODE- 250/636: Performed by: INTERNAL MEDICINE

## 2022-05-26 PROCEDURE — 65270000029 HC RM PRIVATE

## 2022-05-26 PROCEDURE — 36415 COLL VENOUS BLD VENIPUNCTURE: CPT

## 2022-05-26 PROCEDURE — 85025 COMPLETE CBC W/AUTO DIFF WBC: CPT

## 2022-05-26 PROCEDURE — 94640 AIRWAY INHALATION TREATMENT: CPT

## 2022-05-26 PROCEDURE — 74011250637 HC RX REV CODE- 250/637: Performed by: INTERNAL MEDICINE

## 2022-05-26 PROCEDURE — 80048 BASIC METABOLIC PNL TOTAL CA: CPT

## 2022-05-26 PROCEDURE — 74011000250 HC RX REV CODE- 250: Performed by: INTERNAL MEDICINE

## 2022-05-26 PROCEDURE — 74011000258 HC RX REV CODE- 258: Performed by: INTERNAL MEDICINE

## 2022-05-26 PROCEDURE — 94761 N-INVAS EAR/PLS OXIMETRY MLT: CPT

## 2022-05-26 RX ORDER — IPRATROPIUM BROMIDE AND ALBUTEROL SULFATE 2.5; .5 MG/3ML; MG/3ML
3 SOLUTION RESPIRATORY (INHALATION) 2 TIMES DAILY
Status: DISCONTINUED | OUTPATIENT
Start: 2022-05-26 | End: 2022-05-27 | Stop reason: HOSPADM

## 2022-05-26 RX ORDER — ALBUTEROL SULFATE 0.83 MG/ML
2.5 SOLUTION RESPIRATORY (INHALATION)
Status: DISCONTINUED | OUTPATIENT
Start: 2022-05-26 | End: 2022-05-27 | Stop reason: HOSPADM

## 2022-05-26 RX ORDER — GUAIFENESIN 600 MG/1
600 TABLET, EXTENDED RELEASE ORAL EVERY 12 HOURS
Status: DISCONTINUED | OUTPATIENT
Start: 2022-05-26 | End: 2022-05-27 | Stop reason: HOSPADM

## 2022-05-26 RX ORDER — PREDNISONE 10 MG/1
10 TABLET ORAL
Status: DISCONTINUED | OUTPATIENT
Start: 2022-05-27 | End: 2022-05-27 | Stop reason: HOSPADM

## 2022-05-26 RX ADMIN — DOXYCYCLINE 100 MG: 100 CAPSULE ORAL at 10:54

## 2022-05-26 RX ADMIN — ENOXAPARIN SODIUM 40 MG: 100 INJECTION SUBCUTANEOUS at 10:55

## 2022-05-26 RX ADMIN — FAMOTIDINE 40 MG: 20 TABLET, FILM COATED ORAL at 10:54

## 2022-05-26 RX ADMIN — FLUOXETINE 20 MG: 20 CAPSULE ORAL at 10:54

## 2022-05-26 RX ADMIN — LEVOTHYROXINE SODIUM 100 MCG: 0.1 TABLET ORAL at 06:38

## 2022-05-26 RX ADMIN — IPRATROPIUM BROMIDE AND ALBUTEROL SULFATE 3 ML: 2.5; .5 SOLUTION RESPIRATORY (INHALATION) at 20:54

## 2022-05-26 RX ADMIN — CLONAZEPAM 0.5 MG: 0.5 TABLET ORAL at 22:22

## 2022-05-26 RX ADMIN — GUAIFENESIN 600 MG: 600 TABLET, EXTENDED RELEASE ORAL at 10:54

## 2022-05-26 RX ADMIN — HYDROCODONE POLISTIREX AND CHLORPHENIRAMINE POLISTIREX 5 ML: 10; 8 SUSPENSION, EXTENDED RELEASE ORAL at 05:45

## 2022-05-26 RX ADMIN — METHYLPREDNISOLONE SODIUM SUCCINATE 40 MG: 40 INJECTION, POWDER, FOR SOLUTION INTRAMUSCULAR; INTRAVENOUS at 13:09

## 2022-05-26 RX ADMIN — METHYLPREDNISOLONE SODIUM SUCCINATE 40 MG: 40 INJECTION, POWDER, FOR SOLUTION INTRAMUSCULAR; INTRAVENOUS at 05:45

## 2022-05-26 RX ADMIN — SODIUM CHLORIDE, PRESERVATIVE FREE 10 ML: 5 INJECTION INTRAVENOUS at 22:25

## 2022-05-26 RX ADMIN — SPIRONOLACTONE 50 MG: 25 TABLET ORAL at 10:54

## 2022-05-26 RX ADMIN — METFORMIN HYDROCHLORIDE 500 MG: 500 TABLET, EXTENDED RELEASE ORAL at 17:38

## 2022-05-26 RX ADMIN — LOSARTAN POTASSIUM 100 MG: 100 TABLET, FILM COATED ORAL at 10:54

## 2022-05-26 RX ADMIN — CEFTRIAXONE SODIUM 1 G: 1 INJECTION, POWDER, FOR SOLUTION INTRAMUSCULAR; INTRAVENOUS at 10:53

## 2022-05-26 RX ADMIN — IPRATROPIUM BROMIDE AND ALBUTEROL SULFATE 3 ML: 2.5; .5 SOLUTION RESPIRATORY (INHALATION) at 08:12

## 2022-05-26 RX ADMIN — DOXYCYCLINE 100 MG: 100 CAPSULE ORAL at 21:24

## 2022-05-26 RX ADMIN — TIZANIDINE 4 MG: 4 TABLET ORAL at 21:24

## 2022-05-26 RX ADMIN — GUAIFENESIN 600 MG: 600 TABLET, EXTENDED RELEASE ORAL at 21:24

## 2022-05-26 RX ADMIN — OLANZAPINE 2.5 MG: 2.5 TABLET, FILM COATED ORAL at 22:22

## 2022-05-26 RX ADMIN — SODIUM CHLORIDE, PRESERVATIVE FREE 10 ML: 5 INJECTION INTRAVENOUS at 05:49

## 2022-05-26 RX ADMIN — SODIUM CHLORIDE, PRESERVATIVE FREE 10 ML: 5 INJECTION INTRAVENOUS at 13:18

## 2022-05-26 NOTE — PROGRESS NOTES
Follow up visit with patient in Rm 124  Patient was alone during the visit  Patient shared that she was feeling better and   wanted to walk around.   Provided empathic listening and spiritual support  Advised of  Availability [address] [place] [address]

## 2022-05-26 NOTE — RT PROTOCOL NOTE
RT Nebulizer Bronchodilator Protocol Note    There is a bronchodilator order in the chart from a provider indicating to follow the RT Bronchodilator Protocol and there is an Initiate RT Bronchodilator Protocol order as well (see protocol at bottom of note). CXR Findings:  Recent Results (from the past 2 days)    XR CHEST PA LAT 05/25/2022    Impression  Interval improvement of the previously described right upper lobe  infiltrate. The findings from the last RT Protocol Assessment were as follows:  History of Pulmonary Disease: None or smoker <15 pack years  Respiratory Pattern: Regular pattern and RR 12-20 bpm  Breath Sounds: Slighly diminished and/or crackles  Cough: Strong, spontaneous, non-productive  Indication for Bronchodilator Therapy: Mucolytic ordered  Bronchodilator Assessment Score: 2     Aerosolized bronchodilator medication orders have been revised according to the RT Nebulizer Bronchodilator Protocol below. Respiratory Therapist to perform RT Therapy Protocol Assessment initially then follow the protocol. Repeat RT Therapy Protocol Assessment PRN for score 0-3 or on second treatment, BID, and PRN for scores above 3. No Indications  adjust the frequency to every 6 hours PRN wheezing or bronchospasm, if no treatments needed after 48 hours then discontinue using Per Protocol order mode. If indication present, adjust the RT bronchodilator orders based on the Bronchodilator Assessment Score as indicated below. If a patient is on this medication at home then do not decrease Frequency below that used at home. 0-3  enter or revise RT bronchodilator order(s) to equivalent RT Bronchodilator order with Frequency of every 4 hours PRN for wheezing or increased work of breathing using Per Protocol order mode.         4-6  enter or revise RT Bronchodilator order(s) to two equivalent RT bronchodilator orders with one order with BID Frequency and one order with Frequency of every 4 hours PRN wheezing or increased work of breathing using Per Protocol order mode. 7-10  enter or revise RT Bronchodilator order(s) to two equivalent RT bronchodilator orders with one order with TID Frequency and one order with Frequency of every 4 hours PRN wheezing or increased work of breathing using Per Protocol order mode. 11-13  enter or revise RT Bronchodilator order(s) to one equivalent RT bronchodilator order with QID Frequency and an Albuterol order with Frequency of every 4 hours PRN wheezing or increased work of breathing using Per Protocol order mode. Greater than 13  enter or revise RT Bronchodilator order(s) to one equivalent RT bronchodilator order with every 4 hours Frequency and an Albuterol order with Frequency of every 2 hours PRN wheezing or increased work of breathing using Per Protocol order mode. RT to enter RT Home Evaluation for COPD & MDI Assessment order using Per Protocol order mode.     Electronically signed by RT Matthew on 5/26/2022 at 11:19 AM

## 2022-05-26 NOTE — PROGRESS NOTES
Bedside shift change report given to THALIA Dunham RN (oncoming nurse) by Hoa Toth RN (offgoing nurse). Report included the following information Kardex and Recent Results.

## 2022-05-26 NOTE — PROGRESS NOTES
Problem: Patient Education: Go to Patient Education Activity  Goal: Patient/Family Education  Outcome: Progressing Towards Goal     Problem: Hypertension  Goal: *Blood pressure within specified parameters  Outcome: Progressing Towards Goal     Problem: Patient Education: Go to Patient Education Activity  Goal: Patient/Family Education  Outcome: Progressing Towards Goal

## 2022-05-26 NOTE — RT PROTOCOL NOTE
RT Inhaler-Nebulizer Bronchodilator Protocol Note    There is a bronchodilator order in the chart from a provider indicating to follow the RT Bronchodilator Protocol and there is an Initiate RT Bronchodilator Protocol order as well (see protocol at bottom of note). CXR Findings:  Recent Results (from the past 2 days)    XR CHEST PA LAT 05/25/2022    Impression  Interval improvement of the previously described right upper lobe  infiltrate. The findings from the last RT Protocol Assessment were as follows:  History of Pulmonary Disease: None or smoker <15 pack years  Respiratory Pattern: Regular pattern and RR 12-20 bpm  Breath Sounds: Slighly diminished and/or crackles  Cough: Strong, spontaneous, non-productive  Indication for Bronchodilator Therapy: Decreased or absent breath sounds  Bronchodilator Assessment Score: 2    Aerosolized bronchodilator medication orders have been revised according to the RT Inhaler-Nebulizer Bronchodilator Protocol below. Respiratory Therapist to perform RT Therapy Protocol Assessment initially then follow the protocol. Repeat RT Therapy Protocol Assessment PRN for score 0-3 or on second treatment, BID, and PRN for scores above 3. No Indications  adjust the frequency to every 6 hours PRN wheezing or bronchospasm, if no treatments needed after 48 hours then discontinue using Per Protocol order mode. If indication present, adjust the RT bronchodilator orders based on the Bronchodilator Assessment Score as indicated below. Use Inhaler orders unless patient has one or more of the following: on home nebulizer, not able to hold breath for 10 seconds, is not alert and oriented, cannot activate and use MDI correctly, or respiratory rate 25 breaths per minute or more, then use the equivalent nebulizer order(s) with same Frequency and PRN reasons based on the score. If a patient is on this medication at home then do not decrease Frequency below that used at home.     0-3  enter or revise RT bronchodilator order(s) to equivalent RT Bronchodilator order with Frequency of every 4 hours PRN for wheezing or increased work of breathing using Per Protocol order mode. 4-6  enter or revise RT Bronchodilator order(s) to two equivalent RT bronchodilator orders with one order with BID Frequency and one order with Frequency of every 4 hours PRN wheezing or increased work of breathing using Per Protocol order mode. 7-10  enter or revise RT Bronchodilator order(s) to two equivalent RT bronchodilator orders with one order with TID Frequency and one order with Frequency of every 4 hours PRN wheezing or increased work of breathing using Per Protocol order mode. 11-13  enter or revise RT Bronchodilator order(s) to one equivalent RT bronchodilator order with QID Frequency and an Albuterol order with Frequency of every 4 hours PRN wheezing or increased work of breathing using Per Protocol order mode. Greater than 13  enter or revise RT Bronchodilator order(s) to one equivalent RT bronchodilator order with every 4 hours Frequency and an Albuterol order with Frequency of every 2 hours PRN wheezing or increased work of breathing using Per Protocol order mode. RT to enter RT Home Evaluation for COPD & MDI Assessment order using Per Protocol order mode.     Electronically signed by RT Jasson on 5/25/2022 at 9:00 PM

## 2022-05-26 NOTE — PROGRESS NOTES
Pt resting quietly in bed. Fatigues easily, Tussionex q12 prn effective for cough. Offers no c/o pain, pleasant and cooperative.

## 2022-05-27 VITALS
DIASTOLIC BLOOD PRESSURE: 64 MMHG | BODY MASS INDEX: 34.04 KG/M2 | HEART RATE: 80 BPM | RESPIRATION RATE: 20 BRPM | HEIGHT: 62 IN | WEIGHT: 185 LBS | OXYGEN SATURATION: 92 % | TEMPERATURE: 98.4 F | SYSTOLIC BLOOD PRESSURE: 140 MMHG

## 2022-05-27 PROBLEM — J18.9 PNEUMONIA: Status: RESOLVED | Noted: 2022-05-23 | Resolved: 2022-05-27

## 2022-05-27 PROBLEM — E78.1 PURE HYPERGLYCERIDEMIA: Status: RESOLVED | Noted: 2018-10-21 | Resolved: 2022-05-27

## 2022-05-27 PROCEDURE — 74011000258 HC RX REV CODE- 258: Performed by: INTERNAL MEDICINE

## 2022-05-27 PROCEDURE — 74011250637 HC RX REV CODE- 250/637: Performed by: INTERNAL MEDICINE

## 2022-05-27 PROCEDURE — 94640 AIRWAY INHALATION TREATMENT: CPT

## 2022-05-27 PROCEDURE — 74011000250 HC RX REV CODE- 250: Performed by: INTERNAL MEDICINE

## 2022-05-27 PROCEDURE — 94761 N-INVAS EAR/PLS OXIMETRY MLT: CPT

## 2022-05-27 PROCEDURE — 74011250636 HC RX REV CODE- 250/636: Performed by: INTERNAL MEDICINE

## 2022-05-27 PROCEDURE — 74011636637 HC RX REV CODE- 636/637: Performed by: INTERNAL MEDICINE

## 2022-05-27 RX ORDER — AZITHROMYCIN 250 MG/1
250 TABLET, FILM COATED ORAL DAILY
Qty: 6 TABLET | Refills: 0 | Status: SHIPPED | OUTPATIENT
Start: 2022-05-27

## 2022-05-27 RX ORDER — FLUTICASONE PROPIONATE 50 MCG
2 SPRAY, SUSPENSION (ML) NASAL DAILY
Qty: 1 EACH | Refills: 0 | Status: SHIPPED | OUTPATIENT
Start: 2022-05-27

## 2022-05-27 RX ORDER — FLUTICASONE PROPIONATE 50 MCG
2 SPRAY, SUSPENSION (ML) NASAL DAILY
Status: DISCONTINUED | OUTPATIENT
Start: 2022-05-27 | End: 2022-05-27 | Stop reason: HOSPADM

## 2022-05-27 RX ADMIN — ENOXAPARIN SODIUM 40 MG: 100 INJECTION SUBCUTANEOUS at 08:41

## 2022-05-27 RX ADMIN — PREDNISONE 10 MG: 10 TABLET ORAL at 11:55

## 2022-05-27 RX ADMIN — DOXYCYCLINE 100 MG: 100 CAPSULE ORAL at 08:40

## 2022-05-27 RX ADMIN — LEVOTHYROXINE SODIUM 100 MCG: 0.1 TABLET ORAL at 06:58

## 2022-05-27 RX ADMIN — SODIUM CHLORIDE, PRESERVATIVE FREE 10 ML: 5 INJECTION INTRAVENOUS at 06:58

## 2022-05-27 RX ADMIN — CEFTRIAXONE SODIUM 1 G: 1 INJECTION, POWDER, FOR SOLUTION INTRAMUSCULAR; INTRAVENOUS at 08:41

## 2022-05-27 RX ADMIN — FAMOTIDINE 40 MG: 20 TABLET, FILM COATED ORAL at 08:40

## 2022-05-27 RX ADMIN — SPIRONOLACTONE 50 MG: 25 TABLET ORAL at 08:40

## 2022-05-27 RX ADMIN — FLUOXETINE 20 MG: 20 CAPSULE ORAL at 08:40

## 2022-05-27 RX ADMIN — FLUTICASONE PROPIONATE 2 SPRAY: 50 SPRAY, METERED NASAL at 11:10

## 2022-05-27 RX ADMIN — IPRATROPIUM BROMIDE AND ALBUTEROL SULFATE 3 ML: 2.5; .5 SOLUTION RESPIRATORY (INHALATION) at 07:49

## 2022-05-27 RX ADMIN — LOSARTAN POTASSIUM 100 MG: 100 TABLET, FILM COATED ORAL at 08:41

## 2022-05-27 RX ADMIN — GUAIFENESIN 600 MG: 600 TABLET, EXTENDED RELEASE ORAL at 08:40

## 2022-05-27 RX ADMIN — PREDNISONE 10 MG: 10 TABLET ORAL at 08:41

## 2022-05-27 NOTE — PROGRESS NOTES
Bedside and Verbal shift change report given to RICKI Nicolas RN (oncoming nurse) by Geetha Tadeo RN   (offgoing nurse). Report included the following information SBAR, Kardex, Procedure Summary, Intake/Output, MAR, Accordion, Recent Results and Med Rec Status. Haney score 1  Bed/chair alarm no in use. If in use it is set at the highest volume. Intravenous fluids were administered, IV antibiotic and IV Solumedrol. Patient Vitals for the past 12 hrs:   SpO2   05/26/22 2054 93 %     No flowsheet data found. All lab results for the last 24 hours reviewed.

## 2022-05-27 NOTE — PROGRESS NOTES
Problem: Patient Education: Go to Patient Education Activity  Goal: Patient/Family Education  Outcome: Progressing Towards Goal     Problem: Pneumonia: Day 1  Goal: Respiratory  Outcome: Resolved/Met  Goal: *Pneumoccocal vaccine administered  Outcome: Resolved/Met     Problem: Pneumonia: Day 2  Goal: Discharge Planning  Outcome: Resolved/Met     Problem: Pneumonia: Day 3  Goal: Off Pathway (Use only if patient is Off Pathway)  Outcome: Resolved/Met  Goal: Activity/Safety  Outcome: Resolved/Met  Goal: Consults, if ordered  Outcome: Resolved/Met  Goal: Diagnostic Test/Procedures  Outcome: Resolved/Met  Goal: Nutrition/Diet  Outcome: Resolved/Met  Goal: Discharge Planning  Outcome: Resolved/Met  Goal: Medications  Outcome: Resolved/Met  Goal: Respiratory  Outcome: Resolved/Met  Goal: Treatments/Interventions/Procedures  Outcome: Resolved/Met  Goal: Psychosocial  Outcome: Resolved/Met  Goal: *Oxygen saturation within defined limits  Outcome: Resolved/Met  Goal: *Hemodynamically stable  Outcome: Resolved/Met  Goal: *Demonstrates progressive activity  Outcome: Resolved/Met  Goal: *Tolerating diet  Outcome: Resolved/Met  Goal: *Describes available resources and support systems  Outcome: Resolved/Met  Goal: *Optimal pain control at patient's stated goal  Outcome: Resolved/Met     Problem: Pneumonia: Day 4  Goal: Off Pathway (Use only if patient is Off Pathway)  Outcome: Progressing Towards Goal  Goal: Activity/Safety  Outcome: Progressing Towards Goal  Goal: Nutrition/Diet  Outcome: Progressing Towards Goal  Goal: Discharge Planning  Outcome: Progressing Towards Goal  Goal: Medications  Outcome: Progressing Towards Goal  Goal: Respiratory  Outcome: Progressing Towards Goal  Goal: Treatments/Interventions/Procedures  Outcome: Progressing Towards Goal  Goal: Psychosocial  Outcome: Progressing Towards Goal     Problem: Pneumonia: Discharge Outcomes  Goal: *Demonstrates progressive activity  Outcome: Progressing Towards Goal  Goal: *Describes follow-up/return visits to physicians  Outcome: Progressing Towards Goal  Goal: *Tolerating diet  Outcome: Progressing Towards Goal  Goal: *Verbalizes name, dosage, time, side effects, and number of days to continue medications  Outcome: Progressing Towards Goal  Goal: *Influenza immunization  Outcome: Progressing Towards Goal  Goal: *Pneumococcal immunization  Outcome: Progressing Towards Goal  Goal: *Respiratory status at baseline  Outcome: Progressing Towards Goal  Goal: *Vital signs within defined limits  Outcome: Progressing Towards Goal  Goal: *Describes available resources and support systems  Outcome: Progressing Towards Goal  Goal: *Optimal pain control at patient's stated goal  Outcome: Progressing Towards Goal     Problem: Hypertension  Goal: *Blood pressure within specified parameters  Outcome: Progressing Towards Goal  Goal: *Labs within defined limits  Outcome: Progressing Towards Goal     Problem: Patient Education: Go to Patient Education Activity  Goal: Patient/Family Education  Outcome: Progressing Towards Goal     Problem: Falls - Risk of  Goal: *Absence of Falls  Description: Document Davis Fall Risk and appropriate interventions in the flowsheet.   Outcome: Progressing Towards Goal  Note: Fall Risk Interventions:            Medication Interventions: Bed/chair exit alarm,Patient to call before getting OOB         History of Falls Interventions: Door open when patient unattended,Room close to nurse's station         Problem: Patient Education: Go to Patient Education Activity  Goal: Patient/Family Education  Outcome: Progressing Towards Goal

## 2022-05-27 NOTE — PROGRESS NOTES
Care Management Interventions  PCP Verified by CM: Yes Trish Reinoso MD)  Last Visit to PCP: 05/20/22  Palliative Care Criteria Met (RRAT>21 & CHF Dx)?: No (No MD order)  Mode of Transport at Discharge: Other (see comment) (POV)  Transition of Care Consult (CM Consult): Discharge Planning  MyChart Signup: No  Discharge Durable Medical Equipment: No  Physical Therapy Consult: No  Occupational Therapy Consult: No  Speech Therapy Consult: No  Support Systems: Spouse/Significant Other  Confirm Follow Up Transport: Family  The Plan for Transition of Care is Related to the Following Treatment Goals : Treat pneumonia  Discharge Location  Patient Expects to be Discharged to[de-identified] Home    Patient is being discharged home today. No needs identified after discharge. Patient states she is aware and agrees with discharge plan. Told patient to call case management for any questions or concerns after discharge. RUR: 10% LOW     Transition of Care (MAREK) Plan:  Home     MAREK Transportation:       How is patient being transported at discharge? POV     When? Today      Is transport scheduled? N/a     Follow-up appointment and transportation:     PCPMelissa Sommer December DO 5/31 10:20am     Who is transporting to the follow-up appointment? POV       Is transport for follow up appointment scheduled? N/a     Communication plan (with patient/family): Patient aware and agrees with discharge plan. Who is being called? Patient or Next of Kin? Responsible party? Patient       What number(s) is to be used? [phone]      What service provider is calling for [facility] services? [address]       When are they calling?  24--48 hours prior to hailey       Click here to complete [address] including selection of the Healthcare Decision Maker Relationship (ie \"Primary\")  @[email or web address]

## 2022-05-27 NOTE — PROGRESS NOTES
Bedside and Verbal shift change report given to M. Alba Fleischer RN (oncoming nurse) by RICKI Villegas (offgoing nurse). Report included the following information SBAR, Kardex, Intake/Output, MAR, Recent Results and Quality Measures. Haney score 1  Bed/chair alarm are in use. If in use it is set at the highest volume. Patient Vitals for the past 12 hrs:   Temp Pulse Resp BP SpO2   05/27/22 0000 98.8 °F (37.1 °C) 95 18 125/60 94 %   05/26/22 2054     93 %     No flowsheet data found. All lab results for the last 24 hours reviewed.

## 2022-05-27 NOTE — PROGRESS NOTES
Problem: Patient Education: Go to Patient Education Activity  Goal: Patient/Family Education  Outcome: Progressing Towards Goal     Problem: Pneumonia: Day 1  Goal: Respiratory  Outcome: Progressing Towards Goal     Problem: Pneumonia: Discharge Outcomes  Goal: *Tolerating diet  Outcome: Progressing Towards Goal     Problem: Hypertension  Goal: *Blood pressure within specified parameters  Outcome: Progressing Towards Goal  Goal: *Labs within defined limits  Outcome: Progressing Towards Goal     Problem: Falls - Risk of  Goal: *Absence of Falls  Description: Document Davis Fall Risk and appropriate interventions in the flowsheet.   Outcome: Progressing Towards Goal  Note: Fall Risk Interventions:            Medication Interventions: Bed/chair exit alarm,Patient to call before getting OOB         History of Falls Interventions: Door open when patient unattended,Room close to nurse's station

## 2022-05-27 NOTE — PROGRESS NOTES
[facility]  Hospitalist Progress Note    NAME: Asher Shaw   :  1965   MRN:  [id number]     Total duration of encounter: 3 days      Interim Hospital Summary: 55 y.o. female who presented on 2022 with Community acquired pneumonia of right upper lobe of lung. She has a past medical history of Depression, Diabetes ([facility] [address] Fibromyalgia, Grade 2 ankle sprain (2018), Hypertension, Lupus (systemic lupus erythematosus) ([facility] [address] and Menopause. She has no past medical history of Endocrine disorder. .     Pt admitted with one week h/o worsening cough, wheezing, SOB failing to respond to outpt tx. Has been tx with Azithromycin / Doxycycline / Medrol dose pack. Also has home nebulizer. Pt admitted for IV antibiotics , IV steroids, and Nebulized bronchodilators. Subjective:     Chief Complaint / Reason for Physician Visit  \"better\".   Discussed with RN   Still very weak  Slept some better  Prefers Tussionex over Tessalon perls    Review of Systems:  Symptom Y/N Comments  Symptom Y/N Comments   Fever/Chills n   Chest Pain n    Poor Appetite n   Edema n    Cough y   Abdominal Pain n    Sputum y   Joint Pain n    SOB/RAHMAN y   Pruritis/Rash n    Nausea/vomit n   Tolerating PT/OT     Diarrhea n   Tolerating Diet y    Constipation n   Other         Current Facility-Administered Medications:     guaiFENesin ER (MUCINEX) tablet 600 mg, 600 mg, Oral, Q12H, Meli Montemayor MD, 600 mg at 22    albuterol-ipratropium (DUO-NEB) 2.5 MG-0.5 MG/3 ML, 3 mL, Nebulization, BID, Meli Montemayor MD, 3 mL at 22    albuterol (PROVENTIL VENTOLIN) nebulizer solution 2.5 mg, 2.5 mg, Nebulization, Q4H PRN, Meli Montemayor MD    HYDROcodone-chlorpheniramine (TUSSIONEX) oral suspension 5 mL, 5 mL, Oral, Q12H PRN, Meli Montemayor MD, 5 mL at 22 0545    methylPREDNISolone (PF) (SOLU-MEDROL) injection 40 mg, 40 mg, IntraVENous, Q8H, Meli Montemayor MD, 40 mg at 22 1309    sodium chloride (NS) flush 5-10 mL, 5-10 mL, IntraVENous, PRN, Marcia Hankins MD    famotidine (PEPCID) tablet 40 mg, 40 mg, Oral, DAILY, Marcia Hankins MD, 40 mg at 05/26/22 1054    FLUoxetine (PROzac) capsule 20 mg, 20 mg, Oral, DAILY, Marcia Hankins MD, 20 mg at 05/26/22 1054    levothyroxine (SYNTHROID) tablet 100 mcg, 100 mcg, Oral, ACB, Marcia Hankins MD, 100 mcg at 05/26/22 8914    losartan (COZAAR) tablet 100 mg, 100 mg, Oral, DAILY, Marcia Hankins MD, 100 mg at 05/26/22 1054    meloxicam (MOBIC) tablet 7.5 mg, 7.5 mg, Oral, DAILY PRN, Marcia Hankins MD    metFORMIN ER (GLUCOPHAGE XR) tablet 500 mg, 500 mg, Oral, DAILY WITH DINNER, Marcia Hankins MD, 500 mg at 05/26/22 1738    OLANZapine (ZyPREXA) tablet 2.5 mg, 2.5 mg, Oral, QHS, Marcia Hankins MD, 2.5 mg at 05/25/22 2119    spironolactone (ALDACTONE) tablet 50 mg, 50 mg, Oral, DAILY, Marcia Hankins MD, 50 mg at 05/26/22 1054    sodium chloride (NS) flush 5-40 mL, 5-40 mL, IntraVENous, Q8H, Marcia Hankins MD, 10 mL at 05/26/22 1318    sodium chloride (NS) flush 5-40 mL, 5-40 mL, IntraVENous, PRN, Marcia Hankins MD    acetaminophen (TYLENOL) tablet 650 mg, 650 mg, Oral, Q6H PRN, 650 mg at 05/24/22 2119 **OR** acetaminophen (TYLENOL) suppository 650 mg, 650 mg, Rectal, Q6H PRN, Marcia Hankins MD    polyethylene glycol (MIRALAX) packet 17 g, 17 g, Oral, DAILY PRN, Marcia Hankins MD    enoxaparin (LOVENOX) injection 40 mg, 40 mg, SubCUTAneous, DAILY, Marcia Hankins MD, 40 mg at 05/26/22 1055    cefTRIAXone (ROCEPHIN) 1 g in 0.9% sodium chloride (MBP/ADV) 50 mL MBP, 1 g, IntraVENous, Q24H, Marcia Hankins MD, Last Rate: 100 mL/hr at 05/26/22 1053, 1 g at 05/26/22 1053    doxycycline (MONODOX) capsule 100 mg, 100 mg, Oral, Q12H, Marcia Hankins MD, 100 mg at 05/26/22 2124    clonazePAM (KlonoPIN) tablet 0.5 mg, 0.5 mg, Oral, QHS PRN, Marcia Hankins MD, 0.5 mg at 05/25/22 2120    tiZANidine (ZANAFLEX) tablet 4 mg, 4 mg, Oral, QHS, Marcia Hankins MD, 4 mg at 05/26/22 2124    Objective:     VITALS:   Patient Vitals for the past 12 hrs:   Temp Resp BP SpO2   05/26/22 2054    93 %   05/26/22 1600 98.6 °F (37 °C) 18 137/73 93 %       Intake/Output Summary (Last 24 hours) at 5/26/2022 2205  Last data filed at 5/26/2022 1730  Gross per 24 hour   Intake 1760 ml   Output    Net 1760 ml        PHYSICAL EXAM:  General: WD, WN. Alert, cooperative, no acute distress    EENT:  EOMI. Anicteric sclerae. MMM  Resp:  Reduced excursion, no wheeze, scant baslilar rales. No accessory muscle use  CV:  Regular  rhythm,  No edema  GI:  Soft, Non distended, Non tender. +Bowel sounds  Neurologic:  Alert and oriented X 3, normal speech, nonfocal  Psych:   No depression or anxiety  Skin:  No rashes. No jaundice    LABS:  I reviewed today's most current labs and imaging studies. Pertinent labs include:  Recent Labs     05/26/22  0553 05/24/22  0539   WBC 24.3* 11.3*   HGB 13.1 12.4   HCT 39.7 37.4    311     Recent Labs     05/26/22  0553 05/24/22  0539    142   K 4.1 3.3*    106   CO2 22 27   * 84   BUN 20 16   CREA 0.95 0.86   CA 9.8 8.7   MG  --  1.7     CULTURES  Paired BC 5/23: NG x 3d  (machine flagged single bottle)    Sputum 5/23:  Rare wbc, rare Epi, few GPC clusters - FINAL: Mod Normal Respiratory Bhavana      EKG: SB 55 bpm, PRWP, [address] 48      Radiology:  CXR PA/LAT 5/20:  Frontal and lateral views of the chest show clear lungs. The heart, mediastinum  and pulmonary vasculature are normal.  The bony thorax is unremarkable.   IMPRESSION: NORMAL CHEST    CXR PA/LAT 5/23:  PA and lateral views of the chest were obtained. The cardiac silhouette is  normal in size. A somewhat focal area of hazy density is noted in the region of  the right upper lobe. This may represent developing infiltration. A follow-up  chest examination is recommended.  There is evidence of thoracic spondylosis.   IMPRESSION: Findings suggestive of developing right upper lobe infiltration    CXR PA/LAT 5/25:   PA and lateral views of the chest were obtained. There has been very slight  improvement of the aeration of the right upper lobe. Specifically, the  previously described right upper lobe infiltrate is less conspicuous. A  follow-up chest examination is recommended.   IMPRESSION: Interval improvement of the previously described right upper lobe infiltrate. Procedures: see electronic medical records for all procedures/Xrays and details which were not copied into this note but were reviewed prior to creation of Plan. Assessment / Plan:    Principal Problem:    Community acquired pneumonia of right upper lobe of lung (5/23/2022)  Active Problems:    Asplenia (12/27/2018)      Overview: After horse riding accident  Patient admitted due to failure of outpatient treatment  New infiltrate documented in the right upper lung fields  Has already completed 5-day course of Azithromycin beginning 5/17 and Medrol 6d pack.   Also was started on Doxycycline 100 mg twice daily beginning 5/20  Oxygenation adequate  Elevated WBC attributed to steroids  DuoNeb by nebulizer 4 times daily  Paired blood culture, sputum for Gram stain and culture  Empiric treatment with Rocephin 1 g daily x7 days, doxycycline 100 mg twice daily orally x7 days  Solumedrol tapered to oral Prednisone 10mg tid 5d, then bid 5d, then daily 5d  Added Tussionex bid prn  Mucinex 600mg bid       Lupus (systemic lupus erythematosus) ([facility] [address] (10/17/2018)  Patient notes a diagnosis of lupus versus fibromyalgia  Patient's med rec noted she is not using Plaquenil  Maintain Zanaflex and Klonopin at bedtime  Continue Mobic 7.5 mg daily as needed       Controlled type 2 diabetes mellitus with complication, without long-term current use of insulin ([facility]) (10/19/2018)  Monitor fasting glucose daily  Maintain Metformin 500 mg daily with supper  FBS 84 this AM  Wean off Solumedrol  Results for Yehuda Almeida (MRN [id number]) as of 5/26/2022 22:08   5/20/2022 18:00 5/23/2022 12:26 5/24/2022 05:39 5/26/2022 05:53   Glucose 110 (H) 102 (H) 84 136 (H)         Hypertension (10/19/2018)  Continue home regiment of Cozaar 100 mg daily, Spironolactone 25 mg daily       Pure hyperglyceridemia (10/21/2018)  Resume Crestor on discharge       Acquired hypothyroidism (5/23/2022)  Maintain Levothyroxine  100 mcg daily        SAFETY:   Code Status:Full  DVT prophylaxis:Lovenox  Stress Ulcer prophylaxis: Pepcid  Bladder catheter:no  Family Contact Info:  Primary Emergency Contact: [address], Home Phone: [phone]  Bedded: [facility] Room 124/01  Disposition: TBD, likely home Fri / Sat  (Gave pt work slip for 5/23 - 6/5. RTW 6/6  Admission status:  Inpatient       Reviewed most current lab test results and cultures  YES  Reviewed most current radiology test results   YES  Review and summation of old records today    NO  Reviewed patient's current orders and MAR    YES  PMH/SH reviewed - no change compared to H&P    Care Plan discussed with:                                   Comments  Patient x     Family      RN x     Care Manager  x     Consultant                          x Multidiciplinary team rounds were held today with , nursing, pharmacist and clinical coordinator. Patient's plan of care was discussed; medications were reviewed and discharge planning was addressed.         ____________________________________________    Total NON Critical Care TIME:  30   Minutes        Comments   >50% of visit spent in counseling and coordination of care x      Signed: Wesley Martin MD  [facility] Hospitalist  [phone]

## 2022-05-27 NOTE — DISCHARGE SUMMARY
Hospitalist Discharge Summary     Patient ID:  Jacob Brown  [id number]  16 y.o.  1965 5/23/2022    PCP on record: Ronald Palmer DO    Admit date: 5/23/2022  Discharge date and time: 5/27/2022    DISCHARGE DIAGNOSIS:  Community acquired pneumonia Improved      CONSULTATIONS:  None    Excerpted HPI from H&P of Ava Barajas MD:    55 y.o. female presenting for admission to [facility] for further evaluation and treatment for Community acquired pneumonia of right upper lobe of lung. She  has a past medical history of Depression, Diabetes ([facility] [address] Fibromyalgia, Grade 2 ankle sprain (12/9/2018), Hypertension, Lupus (systemic lupus erythematosus) ([facility] [address] and Menopause. She has no past medical history of Endocrine disorder.      Patient presenting to the ED for further evaluation and treatment of protracted cough, malaise, and weakness. Noted the onset of symptoms last Tuesday when she presented to the PCP. Diagnosed with an upper respiratory tract infection and prescribed Azithromycin with Z-Spencer for 5 days. With persistent complaints she returned to her PCP on May 20th. She also presented to the ED on the evening of May 20. Evaluation with a chest x-ray was clear, at that time. Oratory included a CBC with a white count of 14.2, negative urine, unremarkable chemistries. Discharged to continue treatment with Doxycycline 100 mg twice daily with Tessalon Perle and Albuterol inhalers. She initially was symptomatic with a headache and sore throat, however the symptoms subsided. SHe has some sputum production which is not discolored. She denies any pleurisy or chest pain. However she does complain with chest tightness and a sense of dyspnea. She has never smoked tobacco.  She does not have any history for asthma.   Past history is noted for asplenia post splenectomy for trauma and treatment with Prevnar vaccine every 5 years     Reassessment in the ED today was noted for a significant jump in the white cell count of 20.5. Screws were noted for a low potassium of 3.4 and GFR of 59. Chest x-ray was noted for a suspected infiltrate in the right upper lung field that was not observed on the previous study 5/20. Influenza A and B as well as COVID were Not Detected on May 20. Paired blood cultures were obtained. Patient received Rocephin and Azithromycin in the ED. She has known allergy to quinolones.   ______________________________________________________________________  DISCHARGE SUMMARY/HOSPITAL COURSE:  Th e patient who came with the above history was admitted and managed as below:  Community acquired pneumonia of right upper lobe of lung (5/23/2022)  Patient being admitted due to failure of outpatient treatment  New infiltrate documented in the right upper lung fields  Is already completed 5-day course of azithromycin beginning 5/17  Also was started on doxycycline 100 mg twice daily beginning 5/20  Monitor oxygenation  DuoNeb by nebulizer 4 times daily  Paired blood culture, sputum for Gram stain and culture  Empiric treatment with Rocephin 1 g daily x7 days, doxycycline 100 mg twice daily orally x7 days       Lupus (systemic lupus erythematosus) ([facility] [address] (10/17/2018)  Patient notes a diagnosis of lupus versus fibromyalgia  Patient's med rec noted she is not using Plaquenil  Maintain Zanaflex and Klonopin at bedtime  Continue Mobic 7.5 mg daily as needed       Controlled type 2 diabetes mellitus with complication, without long-term current use of insulin ([facility]) (10/19/2018)  Monitor fasting glucose daily  Maintain Metformin 500 mg daily with supper       Hypertension (10/19/2018)  Continue home regiment of Cozaar 100 mg daily, Spironolactone 25 mg daily       Pure hyperglyceridemia (10/21/2018)  Resume Crestor on discharge       Acquired hypothyroidism (5/23/2022)  Maintain Levothyroxine  100 mcg daily      The  patient was continued with the treatments as above; she has shown improvement  and on the day of discharge the patient was seen at the bedside; she has no complaints of cough, SOB, chest pain, Fever or chills. Her Vital signs remained stable. Has no records of fever and her oxygenation  Normal at room temperature. Her lasb remain stable. No reported  Growth from blood and sputum culture as of todate. The  Patient was updated of her status  and  Agreed on the  Plan to continue her  treatments at home as usual, and for the pneumonia given a course of azithromycin. She has hx of DM  And preferred to avoid steroids. She was given advise on   ER return precautions. Also follow with her PCP in 1- 2 weeks. _______________________________________________________________________  Patient seen and examined by me on discharge day. Pertinent Findings:  Gen:    Not in distress  Chest: Clear lungs  CVS:   Regular rhythm. No edema  Abd:  Soft, not distended, not tender  Neuro:  Alert, AOX4. LABS:  Pertinent labs include:  Recent Labs     05/26/22  0553   WBC 24.3*   HGB 13.1   HCT 39.7        Recent Labs     05/26/22  0553      K 4.1      CO2 22   *   BUN 20   CREA 0.95   CA 9.8       IMAGING:  No results found. EKG:   unchanged from previous tracings. _______________________________________________________________________  DISCHARGE MEDICATIONS:   Current Discharge Medication List      START taking these medications    Details   fluticasone propionate (FLONASE) 50 mcg/actuation nasal spray 2 Sprays by Both Nostrils route daily. Qty: 1 Each, Refills: 0  Start date: 5/27/2022      azithromycin (ZITHROMAX) 250 mg tablet Take 1 Tablet by mouth daily. Qty: 6 Tablet, Refills: 0  Start date: 5/27/2022         CONTINUE these medications which have NOT CHANGED    Details   benzonatate (Tessalon Perles) 100 mg capsule Take 2 Capsules by mouth three (3) times daily as needed for Cough for up to 7 days.   Qty: 30 Capsule, Refills: 0      OLANZapine (ZyPREXA) 2.5 mg tablet Take 1 Tablet by mouth nightly. Qty: 90 Tablet, Refills: 1      spironolactone (ALDACTONE) 25 mg tablet Take 2 Tablets by mouth daily. Qty: 180 Tablet, Refills: 1      clonazePAM (KlonoPIN) 0.5 mg tablet Take 1 Tablet by mouth daily as needed (Anxiety). Qty: 90 Tablet, Refills: 1    Associated Diagnoses: Bipolar II disorder (HCC)      FLUoxetine (PROzac) 20 mg capsule Take 1 Capsule by mouth daily. Qty: 90 Capsule, Refills: 1      rosuvastatin (CRESTOR) 10 mg tablet TAKE 1 TABLET BY MOUTH DAILY  Qty: 90 Tablet, Refills: 0      levothyroxine (SYNTHROID) 100 mcg tablet TAKE 1 TABLET DAILY ONE HOUR PRIOR TO BREAKFAST OR 2-3 HRS AFTER DINNER  Qty: 90 Tablet, Refills: 1      losartan (COZAAR) 100 mg tablet TAKE 1 TABLET BY MOUTH EVERY DAY  Qty: 90 Tablet, Refills: 3      metFORMIN ER (GLUCOPHAGE XR) 500 mg tablet TAKE 1 TABLET BY MOUTH ONCE DAILY WITH DINNER  Qty: 90 Tablet, Refills: 3      famotidine (Pepcid) 40 mg tablet Take 40 mg by mouth daily. EPINEPHrine (EPIPEN) 0.3 mg/0.3 mL injection INJECT 0.3 ML INTO THE MUSCLE AS DIRECTED ONCE AS NEEDED FOR ALLERGIC RESPONSE. CALL HEALTHCARE PROVIDER OR EMERGENCY ROOM IMMEDIATELY      albuterol (ProAir HFA) 90 mcg/actuation inhaler Take 2 Puffs by inhalation every four (4) hours as needed for Wheezing or Shortness of Breath. Qty: 1 Inhaler, Refills: 2      hydroxychloroquine (Plaquenil) 200 mg tablet Take 200 mg by mouth two (2) times a day. meloxicam (MOBIC) 7.5 mg tablet Take 7.5 mg by mouth daily as needed. tiZANidine (ZANAFLEX) 6 mg capsule Take 6 mg by mouth nightly. STOP taking these medications       doxycycline (VIBRA-TABS) 100 mg tablet Comments:   Reason for Stopping:                 Patient Follow Up Instructions:    Activity: Activity as tolerated  Diet: Regular Diet and Diabetic Diet  Wound Care: None needed    Follow-up with PCP in 1 week    Follow-up tests/labs None  Follow-up Information     Follow up With Specialties Details Why Contact Info    Good, Raquel Barcenas DO Internal Medicine Physician, Pediatric Medicine   [address] [facility] [address] [facility]  [phone]          ________________________________________________________________    Risk of deterioration: Low    Condition at Discharge:  Stable  __________________________________________________________________    Disposition  Home with family, no needs    ____________________________________________________________________    Code Status: Full Code  ___________________________________________________________________      Total time in minutes spent coordinating this discharge (includes going over instructions, follow-up, prescriptions, and preparing report for sign off to her PCP) :  35  minutes    Signed:  Lorenzo Bence, MD

## 2022-05-27 NOTE — ROUTINE PROCESS
Discharge instructions reviewed with patient  Personal belongings returned: n/a  Home meds returned: n/a  To front entrance via ambulated  Discharged home with   @ [id number]

## 2022-05-29 LAB
BACTERIA SPEC CULT: NORMAL
SERVICE CMNT-IMP: NORMAL

## 2022-05-30 LAB
BACTERIA SPEC CULT: NORMAL
SERVICE CMNT-IMP: NORMAL

## 2022-07-13 RX ORDER — ROSUVASTATIN CALCIUM 10 MG/1
TABLET, COATED ORAL
Qty: 90 TABLET | Refills: 0 | Status: SHIPPED | OUTPATIENT
Start: 2022-07-13 | End: 2022-09-29

## 2022-07-13 RX ORDER — LEVOTHYROXINE SODIUM 100 UG/1
TABLET ORAL
Qty: 90 TABLET | Refills: 1 | Status: SHIPPED | OUTPATIENT
Start: 2022-07-13 | End: 2022-09-29

## 2022-07-13 RX ORDER — SPIRONOLACTONE 25 MG/1
25 TABLET ORAL DAILY
Qty: 90 TABLET | Refills: 1 | Status: SHIPPED | OUTPATIENT
Start: 2022-07-13

## 2022-07-13 RX ORDER — FLUOXETINE HYDROCHLORIDE 20 MG/1
20 CAPSULE ORAL DAILY
Qty: 90 CAPSULE | Refills: 0 | Status: SHIPPED | OUTPATIENT
Start: 2022-07-13 | End: 2022-09-14 | Stop reason: SDUPTHER

## 2022-09-09 ENCOUNTER — TRANSCRIBE ORDER (OUTPATIENT)
Dept: SCHEDULING | Age: 57
End: 2022-09-09

## 2022-09-09 DIAGNOSIS — Z12.31 SCREENING MAMMOGRAM FOR HIGH-RISK PATIENT: Primary | ICD-10-CM

## 2022-09-14 ENCOUNTER — APPOINTMENT (OUTPATIENT)
Dept: BEHAVIORAL/MENTAL HEALTH | Age: 57
End: 2022-09-14

## 2022-09-14 ENCOUNTER — HOSPITAL ENCOUNTER (OUTPATIENT)
Dept: BEHAVIORAL/MENTAL HEALTH | Age: 57
Discharge: HOME | End: 2022-09-14
Payer: COMMERCIAL

## 2022-09-14 DIAGNOSIS — F31.81 BIPOLAR II DISORDER (HCC): ICD-10-CM

## 2022-09-14 PROCEDURE — 99442 PR PHYS/QHP TELEPHONE EVALUATION 11-20 MIN: CPT | Performed by: PSYCHIATRY & NEUROLOGY

## 2022-09-14 RX ORDER — FLUOXETINE HYDROCHLORIDE 20 MG/1
20 CAPSULE ORAL DAILY
Qty: 90 CAPSULE | Refills: 3 | Status: SHIPPED | OUTPATIENT
Start: 2022-09-14

## 2022-09-14 RX ORDER — OLANZAPINE 2.5 MG/1
2.5 TABLET ORAL
Qty: 90 TABLET | Refills: 3 | Status: SHIPPED | OUTPATIENT
Start: 2022-09-14

## 2022-09-14 RX ORDER — CLONAZEPAM 0.5 MG/1
0.5 TABLET ORAL
Qty: 90 TABLET | Refills: 1 | Status: SHIPPED | OUTPATIENT
Start: 2022-09-14

## 2022-09-29 RX ORDER — LEVOTHYROXINE SODIUM 100 UG/1
TABLET ORAL
Qty: 90 TABLET | Refills: 1 | Status: SHIPPED | OUTPATIENT
Start: 2022-09-29

## 2022-09-29 RX ORDER — ROSUVASTATIN CALCIUM 10 MG/1
TABLET, COATED ORAL
Qty: 90 TABLET | Refills: 0 | Status: SHIPPED | OUTPATIENT
Start: 2022-09-29

## 2022-10-07 NOTE — TELEPHONE ENCOUNTER
[facility]  Office: [address], DO, Bennie Richter, DO, Ana Barajas, DO, Tres Isaac Blood, DO, Heather Morrison MD, Alison Naqvi MD, Williams Flores MD, Álvaro López MD,  Erickson Cobb MD, Dinh Brush MD, Haily Cotton, DO, Isabel Presley MD,  Courtney Jones MD, Reji Anthony MD, Dorothy Triana DO, Luna Bright MD, Chon Shepard MD, Ronel Porter MD, Henna Bhatia MD, Qamar Lema MD, Cornelia Nash MD, More Negrete, DO, Valorie Saul MD, Robinson Mills MD, Jesus Trevizo, CNP,  Marzena Dangelo, CNP, Dragan Mckeon, CNP, Katy Morel, CNP,  Mccoy Baumgarten, DNP, Drake Lubin, CNP, Zeina Phillips, CNP, Abraham Sheikh, CNP, Giselle Carrillo, CNP, Tanya Mcclelland, CNP, Grace Toney PAElanC, Betito Thomas, CNS, Goyo Bowens, [facility], Mari Guido, CNP, Venia Dandy, [facility], [facility]    Progress Note    10/7/2022    12:24 PM    Name:   Carson Fragoso  MRN:     [id number]     [place]:      [de-identified]   Room:   2020/2020-01   Day:  3  Admit Date:  10/4/2022  9:10 PM    PCP:   Naina Ruiz MD  Code Status:  Full Code    Subjective:     C/C: foot infection  Interval History Status: improved. Feels better today  Denies cp/sob/n/v  Foot feels ok    Brief History:     Per my CANDY:  \"Amol is a 71-year-old male with a unification of medical history of CKD 3, type 2 diabetes and hypertension who follows with Dr. Corrina Cobb, who presents for concern of right foot cellulitis and possible osteomyelitis. Patient has a known history of Charcot deformity and has had multiple admissions for right foot infection in the past.  Care has been transferred to Dr. Christopher Valles (podiatry) for possible Charcot reconstruction.   We have been consulted for assistance with medical management\"    Review of Systems:     Constitutional:  negative for chills, fevers, sweats  Respiratory:  negative Pt called with sy of sore throat, cough and chest congestion that are mild  Tested positive for Covid now  She is vaccinated, working as a nurse    Advised her to isolate, rest, increase fluids, take Vits, may take Tylenol cold and sinus and Robitussin DM, Abx are not working  But if risk of severe illness call PCP on Monday to set up for Monoclonal Ab infusion, go to ER if any SOB for cough, dyspnea on exertion, shortness of breath, wheezing  Cardiovascular:  negative for chest pain, chest pressure/discomfort, palpitations  Gastrointestinal:  negative for abdominal pain, constipation, diarrhea, nausea, vomiting  Neurological:  negative for dizziness, headache    Medications: Allergies:     Allergies   Allergen Reactions    Morphine Itching    Other Other (See Comments)     Other reaction(s): Unknown    Percocet [Oxycodone-Acetaminophen]      Facial swelling       Current Meds:   Scheduled Meds:    cefepime  2,000 mg IntraVENous Q12H    vancomycin (VANCOCIN) intermittent dosing (placeholder)   Other RX Placeholder    enoxaparin  30 mg SubCUTAneous BID    vancomycin  1,250 mg IntraVENous Q24H    insulin glargine  10 Units SubCUTAneous BID    glipiZIDE  20 mg Oral BID AC    cetirizine  10 mg Oral Nightly    nicotine  1 patch TransDERmal Daily    sodium chloride flush  5-40 mL IntraVENous 2 times per day    insulin lispro  0-8 Units SubCUTAneous TID WC    insulin lispro  0-4 Units SubCUTAneous Nightly    gabapentin  900 mg Oral TID    aspirin  81 mg Oral Daily    amLODIPine  5 mg Oral Daily    atorvastatin  20 mg Oral Nightly     Continuous Infusions:    sodium chloride Stopped (10/07/22 0333)    dextrose       PRN Meds: HYDROcodone 5 mg - acetaminophen **OR** HYDROcodone 5 mg - acetaminophen, diphenhydrAMINE, sodium chloride flush, sodium chloride, ondansetron **OR** ondansetron, polyethylene glycol, acetaminophen **OR** acetaminophen, glucose, dextrose bolus **OR** dextrose bolus, glucagon (rDNA), dextrose, albuterol sulfate HFA    Data:     Past Medical History:   has a past medical history of Abscess of right foot, Acquired hammer toe deformity of lesser toe of right foot, ALEJANDRO (acute kidney injury) ([facility] [address] Cellulitis, Cellulitis of left foot, Cellulitis of right foot, Charcot foot due to diabetes mellitus ([facility] [address] Chest pain at rest, Chronic multifocal osteomyelitis of right foot ([facility] [address] Diabetic polyneuropathy associated with type 2 diabetes mellitus ([facility] [address] Essential hypertension, Fractures, multiple, Hyperlipidemia, Hypertension, Leukocytosis, Neuropathy, Pain in right foot, Pneumonia, Right foot infection, Right foot pain, Tobacco abuse, Type II or unspecified type diabetes mellitus without mention of complication, not stated as uncontrolled, Vertigo, Well controlled type 2 diabetes mellitus with neurological manifestations ([facility] [address] Wound dehiscence, Wound, open, and Wound, open. Social History:   reports that he has been smoking cigarettes. He has been smoking an average of 1 pack per day. He has never used smokeless tobacco. He reports that he does not currently use drugs. He reports that he does not drink alcohol. Family History:   Family History   Problem Relation Age of Onset    Diabetes Mother     Cancer Father     Hypertension Maternal Grandmother        Vitals:  BP (!) 128/56   Pulse 79   Temp 98.6 °F (37 °C) (Oral)   Resp 16   Ht 5' 11\" (1.803 m)   Wt 234 lb (106.1 kg)   SpO2 96%   BMI 32.64 kg/m²   Temp (24hrs), Av.5 °F (36.9 °C), Min:98.1 °F (36.7 °C), Max:98.6 °F (37 °C)    Recent Labs     10/06/22  1622 10/06/22  2023 10/07/22  0615 10/07/22  1112   POCGLU 98 155* 97 173*       I/O (24Hr):     Intake/Output Summary (Last 24 hours) at 10/7/2022 1224  Last data filed at 10/7/2022 0622  Gross per 24 hour   Intake 1168.19 ml   Output 1825 ml   Net -656.81 ml       Labs:  Hematology:  Recent Labs     10/04/22  2218 10/05/22  0601   WBC  --  8.8   RBC  --  3.48*   HGB  --  10.2*   HCT  --  30.8*   MCV  --  88.5   MCH  --  29.3   MCHC  --  33.1   RDW  --  13.2   PLT  --  276   MPV  --  8.8   SEDRATE 64*  --    CRP 30.1*  --      Chemistry:  Recent Labs     10/05/22  0601 10/06/22  0644 10/07/22  06     --   --    K 4.4  --   --      --   --    CO2 24  --   --    GLUCOSE 184*  --   --    BUN 18 18 17   CREATININE 1.99* 1.95* 1.94*   ANIONGAP 10  --   -- LABGLOM 36* 37* 37*   CALCIUM 8.8  --   --      Recent Labs     10/05/22  0601 10/05/22  0621 10/06/22  0646 10/06/22  1118 10/06/22  1622 10/06/22  2023 10/07/22  0615 10/07/22  1112   LABA1C 6.9*  --   --   --   --   --   --   --    POCGLU  --    < > 111* 183* 98 155* 97 173*    < > = values in this interval not displayed. ABG:No results found for: POCPH, PHART, PH, POCPCO2, STQ7PSF, PCO2, POCPO2, PO2ART, PO2, POCHCO3, UAU2TAM, HCO3, NBEA, PBEA, BEART, BE, THGBART, THB, YQQ3VAS, TJOM8BRY, H6FCZNZL, O2SAT, FIO2  Lab Results   Component Value Date/Time    SPECIAL 10ML 09/13/2022 06:41 PM     Lab Results   Component Value Date/Time    CULTURE CULTURE IN PROGRESS 10/05/2022 08:45 AM       Radiology:  XR FOOT RIGHT (MIN 3 VIEWS)    Result Date: 10/6/2022  Air is seen in the plantar soft tissues of the medial midfoot. Chronic bony changes at the TMT joints compatible with a neuropathic arthropathy. No obvious new bony erosions are seen. MRI ANKLE RIGHT WO CONTRAST    Result Date: 10/6/2022  Soft tissue defect of the medial plantar aspect of the midfoot. Thin fluid-filled sinus tract extending into the deep soft tissues of the midfoot, measuring up to 2.0 x 0.9 cm on the coronal sequence, raising suspicion for a phlegmon/abscess. There is air in the adjacent soft tissues. Susceptibility artifact in the soft tissues adjacent to the soft tissue defect could represent sequela of prior intervention or retained metallic foreign body. No MR evidence of acute osteomyelitis. Chronic bony changes at the TMT joints compatible with a neuropathic arthropathy. Additional chronic findings as described above. MRI FOOT RIGHT WO CONTRAST    Result Date: 10/6/2022  Soft tissue defect of the medial plantar aspect of the midfoot. Thin fluid-filled sinus tract extending into the deep soft tissues of the midfoot, measuring up to 2.0 x 0.9 cm on the coronal sequence, raising suspicion for a phlegmon/abscess.   There is air in the adjacent soft tissues. Susceptibility artifact in the soft tissues adjacent to the soft tissue defect could represent sequela of prior intervention or retained metallic foreign body. No MR evidence of acute osteomyelitis. Chronic bony changes at the TMT joints compatible with a neuropathic arthropathy. Additional chronic findings as described above.        Physical Examination:        General appearance:  alert, cooperative and no distress  Mental Status:  oriented to person, place and time and normal affect  Lungs:  clear to auscultation bilaterally, normal effort  Heart:  regular rate and rhythm, no murmur  Abdomen:  soft, nontender, nondistended, normal bowel sounds, no masses, hepatomegaly, splenomegaly  Extremities:  no edema, redness, tenderness in the calves  Skin:  foot bandaged    Assessment:        Hospital Problems             Last Modified POA    * (Principal) Type 2 diabetes mellitus with right diabetic foot ulcer ([address] 10/4/2022 Yes    Charcot's joint of right foot 10/4/2022 Yes    Stage 3b chronic kidney disease ([address] (Chronic) 10/5/2022 Yes    Essential hypertension (Chronic) 10/5/2022 Yes       Plan:        [facility] to discharge per IM  I suggested reducing dose of januvia, stopping glipizide and increasing lantus due to ckd, but he wishes to d/w his renal doc prior to making any changes  A1c controlled  D/w wife    Tami Isbell Irma, DO  10/7/2022  12:24 PM

## 2022-10-28 ENCOUNTER — HOSPITAL ENCOUNTER (OUTPATIENT)
Dept: MAMMOGRAPHY | Age: 57
Discharge: HOME | End: 2022-10-28
Attending: OBSTETRICS & GYNECOLOGY
Payer: COMMERCIAL

## 2022-10-28 DIAGNOSIS — Z12.31 SCREENING MAMMOGRAM FOR HIGH-RISK PATIENT: ICD-10-CM

## 2022-10-28 PROCEDURE — 77063 BREAST TOMOSYNTHESIS BI: CPT

## 2022-12-24 RX ORDER — ROSUVASTATIN CALCIUM 10 MG/1
TABLET, COATED ORAL
Qty: 90 TABLET | Refills: 0 | Status: SHIPPED | OUTPATIENT
Start: 2022-12-24

## 2022-12-24 RX ORDER — LEVOTHYROXINE SODIUM 100 UG/1
TABLET ORAL
Qty: 90 TABLET | Refills: 1 | Status: SHIPPED | OUTPATIENT
Start: 2022-12-24

## 2022-12-28 ENCOUNTER — OFFICE VISIT (OUTPATIENT)
Dept: SURGERY | Age: 57
End: 2022-12-28

## 2022-12-28 VITALS
WEIGHT: 200 LBS | TEMPERATURE: 98.6 F | SYSTOLIC BLOOD PRESSURE: 145 MMHG | HEART RATE: 82 BPM | OXYGEN SATURATION: 96 % | DIASTOLIC BLOOD PRESSURE: 95 MMHG | RESPIRATION RATE: 18 BRPM | HEIGHT: 62 IN | BODY MASS INDEX: 36.8 KG/M2

## 2022-12-28 DIAGNOSIS — Z86.010 HX OF COLONIC POLYPS: Primary | ICD-10-CM

## 2022-12-28 RX ORDER — GUAIFENESIN 100 MG/5ML
81 LIQUID (ML) ORAL DAILY
COMMUNITY

## 2022-12-28 NOTE — PATIENT INSTRUCTIONS
Learning About Colonoscopy  What is a colonoscopy? A colonoscopy is a test (also called a procedure) that lets a doctor look inside your large intestine. The doctor uses a thin, lighted tube called a colonoscope. The doctor uses it to look for small growths called polyps, colon or rectal cancer (colorectal cancer), or other problems like bleeding. During the procedure, the doctor can take samples of tissue. The samples can then be checked for cancer or other conditions. The doctor can also take out polyps. How is a colonoscopy done? This procedure is done in a doctor's office or a clinic or hospital. You will get medicine to help you relax and not feel pain. Some people find that they don't remember having the test because of the medicine. The doctor gently moves the colonoscope, or scope, through the colon. The scope is also a small video camera. It lets the doctor see the colon and take pictures. How do you prepare for the procedure? You need to clean out your colon before the procedure so the doctor can see your colon. This depends on which \"colon prep\" your doctor recommends. To clean out your colon, you'll do a \"colon prep\" before the test. This means you stop eating solid foods and drink only clear liquids. You can have water, tea, coffee, clear juices, clear broths, flavored ice pops, and gelatin (such as Jell-O). Do not drink anything red or purple. The day or night before the procedure, you drink a large amount of a special liquid. This causes loose, frequent stools. You will go to the bathroom a lot. Your doctor may have you drink part of the liquid the evening before and the rest on the day of the test. It's very important to drink all of the liquid. If you have problems drinking it, call your doctor. Arrange to have someone take you home after the test.  What can you expect after a colonoscopy? Your doctor will tell you when you can eat and do your usual activities.   Drink a lot of fluid after the test to replace the fluids you may have lost during the colon prep. But don't drink alcohol. Your doctor will talk to you about when you'll need your next colonoscopy. The results of your test and your risk for colorectal cancer will help your doctor decide how often you need to be checked. After the test, you may be bloated or have gas pains. You may need to pass gas. If a biopsy was done or a polyp was removed, you may have streaks of blood in your stool (feces) for a few days. Check with your doctor to see when it is safe to take aspirin and nonsteroidal anti-inflammatory drugs (NSAIDs) again. Problems such as heavy rectal bleeding may not occur until several weeks after the test. This isn't common. But it can happen after polyps are removed. Follow-up care is a key part of your treatment and safety. Be sure to make and go to all appointments, and call your doctor if you are having problems. It's also a good idea to know your test results and keep a list of the medicines you take. Where can you learn more? Go to http://www.gray.com/  Enter Z368 in the search box to learn more about \"Learning About Colonoscopy. \"  Current as of: May 4, 2022               Content Version: 13.4  © 2006-2022 [employer]wise[employer] Inc[employer]. Care instructions adapted under license by [employer] (which disclaims liability or warranty for this information). If you have questions about a medical condition or this instruction, always ask your healthcare professional. [place] [address] any warranty or liability for your use of this information.

## 2022-12-28 NOTE — PROGRESS NOTES
Marylen Ehlers is a 65 y.o. female who presents today with the following:  Chief Complaint   Patient presents with    Colon Cancer Screening     5 year follow-up       HPI    64-year-old female patient of Dr. Aj Jett who presents for possible surveillance colonoscopy. She believes her last colonoscopy about 5 years ago and they found polyps and was told that she needed a repeat colonoscopy in 5 years. She has no family history of colon cancer. She denies any melena or hematochezia or diarrhea or constipation. She has had no abdominal pain or unexpected weight loss. She has had no change in the caliber of her stool and no recent stool testing. 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 She is also had a D&C and a pilonidal cyst excision. She does not smoke and rarely drinks alcohol. She does have a history of hypertension and lupus and diabetes that is well controlled. She does take an 81 mg aspirin. She has been vaccinated for COVID and contracted COVID in January 22 but did not require hospitalization.   Past Medical History:   Diagnosis Date    Depression     Diabetes ([address]     Fibromyalgia     Grade 2 ankle sprain 12/9/2018    Hypertension     Lupus (systemic lupus erythematosus) ([facility] [address]     Menopause        Past Surgical History:   Procedure Laterality Date    HX COLONOSCOPY  2017    HX CYST REMOVAL      pilondal cyst    HX DILATION AND CURETTAGE  2015    HX KNEE ARTHROSCOPY Left 2020    HX SPLENECTOMY         Social History     Socioeconomic History    Marital status:      Spouse name: Not on file    Number of children: Not on file    Years of education: Not on file    Highest education level: Not on file   Occupational History    Occupation: [employer]   Tobacco Use    Smoking status: Never    Smokeless tobacco: Never   Substance and Sexual Activity    Alcohol use: No    Drug use: No    Sexual activity: Yes   Other Topics Concern     Service No Blood Transfusions No    Caffeine Concern No    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 Hobby Hazards No    Sleep Concern No    Stress Concern Yes    Weight Concern Yes    Special Diet Yes     Comment: Food Allergies & Diabetic    Back Care Yes    Exercise Yes    Bike Helmet Not Asked    Seat Belt Yes    Self-Exams Yes   Social History Narrative    Not on file     Social Determinants of Health     Financial Resource Strain: Not on file   Food Insecurity: Not on file   Transportation Needs: Not on file   Physical Activity: Not on file   Stress: Not on file   Social Connections: Not on file   Intimate Partner Violence: Not on file   Housing Stability: Not on file       Family History   Problem Relation Age of Onset    Other Mother         Mental Illness    Cancer Father         CLL. Diabetes Father     Hypertension Father        Allergies   Allergen Reactions    Latex Unable to Obtain    Other Food Anaphylaxis     Pitted Fruits. [place] Anaphylaxis    Ciprofloxacin Unable to Obtain    Levaquin [Levofloxacin] Unable to Obtain    Morphine Unable to Obtain    Pcn [Penicillins] Unable to Obtain    Vancomycin Unable to Obtain       Current Outpatient Medications   Medication Sig    aspirin 81 mg chewable tablet Take 81 mg by mouth daily. rosuvastatin (CRESTOR) 10 mg tablet TAKE 1 TABLET BY MOUTH DAILY    levothyroxine (SYNTHROID) 100 mcg tablet TAKE 1 TABLET BY MOUTH DAILY 1 HOUR BEFORE BREAKFAST AND 2 TO 3 HOURS AFTER DINNER    clonazePAM (KlonoPIN) 0.5 mg tablet Take 1 Tablet by mouth daily as needed (Anxiety). FLUoxetine (PROzac) 20 mg capsule Take 1 Capsule by mouth daily. OLANZapine (ZyPREXA) 2.5 mg tablet Take 1 Tablet by mouth nightly. spironolactone (ALDACTONE) 25 mg tablet Take 1 Tablet by mouth daily. fluticasone propionate (FLONASE) 50 mcg/actuation nasal spray 2 Sprays by Both Nostrils route daily. azithromycin (ZITHROMAX) 250 mg tablet Take 1 Tablet by mouth daily. losartan (COZAAR) 100 mg tablet TAKE 1 TABLET BY MOUTH EVERY DAY    metFORMIN ER (GLUCOPHAGE XR) 500 mg tablet TAKE 1 TABLET BY MOUTH ONCE DAILY WITH DINNER    famotidine (PEPCID) 40 mg tablet Take 40 mg by mouth daily. EPINEPHrine (EPIPEN) 0.3 mg/0.3 mL injection INJECT 0.3 ML INTO THE MUSCLE AS DIRECTED ONCE AS NEEDED FOR ALLERGIC RESPONSE. CALL HEALTHCARE PROVIDER OR EMERGENCY ROOM IMMEDIATELY    albuterol (ProAir HFA) 90 mcg/actuation inhaler Take 2 Puffs by inhalation every four (4) hours as needed for Wheezing or Shortness of Breath. hydrOXYchloroQUINE (PLAQUENIL) 200 mg tablet Take 200 mg by mouth two (2) times a day. meloxicam (MOBIC) 7.5 mg tablet Take 7.5 mg by mouth daily as needed. tiZANidine (ZANAFLEX) 6 mg capsule Take 6 mg by mouth nightly. No current facility-administered medications for this visit. The above histories, medications and allergies have been reviewed. ROS    Visit Vitals  BP (!) 145/95   Pulse 82   Temp 98.6 °F (37 °C) (Temporal)   Resp 18   Ht 5' 2\" (1.575 m)   Wt 200 lb (90.7 kg)   SpO2 96%   BMI 36.58 kg/m²     Physical Exam  Constitutional:       Appearance: Normal appearance. Cardiovascular:      Rate and Rhythm: Normal rate and regular rhythm. Pulmonary:      Effort: No respiratory distress. Breath sounds: Normal breath sounds. Abdominal:      General: There is no distension. Palpations: Abdomen is soft. There is no mass. Tenderness: There is no abdominal tenderness. Comments: Midline scar well-healed. Neurological:      Mental Status: She is alert. 1. Hx of colonic polyps  Recommend colonoscopy. The procedure was explained in detail including the risks and benefits. Risks shared included risks of missed lesions, incomplete exam, colon injury or perforation. Risks associated with anesthesia were also discussed. The patient wishes to proceed and we will schedule.     The patient was counseled at length about the risks of yelena Covid-19 during their perioperative period and any recovery window from their procedure. The patient was made aware that yelena Covid-19  may worsen their prognosis for recovering from their procedure and lend to a higher morbidity and/or mortality risk. All material risks, benefits, and reasonable alternatives including postponing the procedure were discussed. The patient does  wish to proceed with the procedure at this time. Follow-up and Dispositions    Return for post procedure.          Cody Porter MD

## 2022-12-28 NOTE — LETTER
12/28/2022    Patient: Aracely Leiva   YOB: 1965   Date of Visit: 12/28/2022     Darryle Homer, DO  [address] [facility] [address]  Via Fax: [phone]    Dear Darryle Homer, DO,      Thank you for referring Ms. Markus Saleem to [address] [place] [address] for evaluation. My notes for this consultation are attached. If you have questions, please do not hesitate to call me. I look forward to following your patient along with you.       Sincerely,    Arcelia Harris MD

## 2022-12-28 NOTE — PROGRESS NOTES
Identified pt with two pt identifiers (name and ). Reviewed chart in preparation for visit and have obtained necessary documentation. Flex Conway is a 65 y.o. female  Chief Complaint   Patient presents with    Colon Cancer Screening     5 year follow-up     There were no vitals taken for this visit. 1. Have you been to the ER, urgent care clinic since your last visit? Hospitalized since your last visit? No    2. Have you seen or consulted any other health care providers outside of the [address] [place] [address] since your last visit? Include any pap smears or colon screening. Yes Where: [facility]      Patient and provider made aware of elevated BP x2. Patient asymptomatic. Patient reminded to monitor BP, continue to take BP medications if prescribed, and follow up with PCP/Cardiologist.  Patient expressed understanding and agreement.

## 2023-01-06 ENCOUNTER — ANESTHESIA EVENT (OUTPATIENT)
Dept: SURGERY | Age: 58
End: 2023-01-06
Payer: COMMERCIAL

## 2023-01-11 NOTE — PERIOP NOTES
[address] [place] [address]  SURGICAL PRE-ADMISSION INSTRUCTIONS    ARRIVAL  You will be called the day before your surgery with your expected arrival time. Sign in at the  of the hospital.  You will be directed to the Surgical Waiting Room. Please arrive at your scheduled appointment time. You have been scheduled to arrive for your procedure one or two hours prior to the expected start time of your procedure. Every effort will be made to minimize your wait but please be aware that unforeseen circumstances may affect our schedule. EATING  DO NOT EAT OR DRINK ANYTHING AFTER MIDNIGHT ON THE EVENING BEFORE YOUR SURGERY OR ON THE DAY OF YOUR SURGERY except for your medications (as instructed) with a sip of water. Do not use gum, mints or lozenges on the morning of your surgery. Please do not smoke or chew tobacco before your surgery. MEDICATIONS   Take the following medications on the morning of your surgery with the smallest amount of water possible : NONE    STOP THESE MEDICATIONS AT THE TIMES LISTED BELOW  Aspirin ;  7 days before                                                   DRIVING/TRANSPORATION  Have a responsible adult to drive you home from the hospital and to stay with you over night. Please have them plan to remain in the hospital during your surgery. Your surgery will not be done if you do not have a responsible adult to take you home and to stay with you. If you have arranged for public transport, you must have a responsible adult to ride with you (who is not the ). You may not drive for 24 hours after anesthesia. PREPARATION  If you have a Living WiIl/Advance Directive, please bring a copy with you to scan into your chart. Please DO NOT wear makeup or nail polish  Please leave valuables at home,  DO NOT wear jewelry. Wear loose, comfortable clothing that is large enough to cover a bulky dressing.     SPECIAL INSTRUCTIONS:  Follow your surgeon's instructions for preoperative bowel prep.     Reviewed above preoperative instructions and answered questions by phone interview    Patient:  Roel Levi   Date:     January 11, 2023  Time:   2:26 PM    RN:  Christianne Lombardi RN    Date:     January 11, 2023  Time:   2:26 PM

## 2023-01-23 ENCOUNTER — TELEPHONE (OUTPATIENT)
Dept: SURGERY | Age: 58
End: 2023-01-23

## 2023-01-23 NOTE — TELEPHONE ENCOUNTER
Pt called and left vm with insurance ID and Group numbers update. I have updated the information in the chart.

## 2023-01-23 NOTE — H&P
History and Physical    HPI    78-year-old female patient of Dr. Garcia Padilla who presents for possible surveillance colonoscopy. She believes her last colonoscopy was about 5 years ago and they found polyps and she was told that she needed a repeat colonoscopy in 5 years. She has no family history of colon cancer. She denies any melena or hematochezia or diarrhea or constipation. She has had no abdominal pain or unexpected weight loss. She has had no change in the caliber of her stool and no recent stool testing. 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 She has also had a D&C and a pilonidal cyst excision. She does not smoke and rarely drinks alcohol. She does have a history of hypertension and lupus and diabetes that is well controlled. She does take an 81 mg aspirin. She has been vaccinated for COVID and contracted COVID in January 22 but did not require hospitalization. Past Medical History:   Diagnosis Date    Depression     Diabetes ([address]     Fibromyalgia     Grade 2 ankle sprain 12/9/2018    Hypertension     Lupus (systemic lupus erythematosus) ([facility] [address]     Menopause        Past Surgical History:   Procedure Laterality Date    HX COLONOSCOPY  2017    HX CYST REMOVAL      pilondal cyst    HX DILATION AND CURETTAGE  2015    HX KNEE ARTHROSCOPY Left 2020    HX SPLENECTOMY         Social History     Socioeconomic History    Marital status:      Spouse name: Not on file    Number of children: Not on file    Years of education: Not on file    Highest education level: Not on file   Occupational History    Occupation: [employer]   Tobacco Use    Smoking status: Never    Smokeless tobacco: Never   Substance and Sexual Activity    Alcohol use: No    Drug use: No    Sexual activity: Yes   Other Topics Concern     Service No    Blood Transfusions No    Caffeine Concern No    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     Hobby Hazards No    Sleep Concern No    Stress Concern Yes    Weight Concern Yes    Special Diet Yes     Comment: Food Allergies & Diabetic    Back Care Yes    Exercise Yes    Bike Helmet Not Asked    Seat Belt Yes    Self-Exams Yes   Social History Narrative    Not on file     Social Determinants of Health     Financial Resource Strain: Not on file   Food Insecurity: Not on file   Transportation Needs: Not on file   Physical Activity: Not on file   Stress: Not on file   Social Connections: Not on file   Intimate Partner Violence: Not on file   Housing Stability: Not on file       Family History   Problem Relation Age of Onset    Other Mother         Mental Illness    Cancer Father         CLL. Diabetes Father     Hypertension Father        Allergies   Allergen Reactions    Latex Unable to Obtain    Other Food Anaphylaxis     Pitted Fruits. [place] Anaphylaxis    Ciprofloxacin Unable to Obtain    Levaquin [Levofloxacin] Unable to Obtain    Morphine Unable to Obtain    Pcn [Penicillins] Unable to Obtain    Vancomycin Unable to Obtain       No current facility-administered medications for this encounter. Current Outpatient Medications   Medication Sig    aspirin 81 mg chewable tablet Take 81 mg by mouth daily. rosuvastatin (CRESTOR) 10 mg tablet TAKE 1 TABLET BY MOUTH DAILY    levothyroxine (SYNTHROID) 100 mcg tablet TAKE 1 TABLET BY MOUTH DAILY 1 HOUR BEFORE BREAKFAST AND 2 TO 3 HOURS AFTER DINNER    clonazePAM (KlonoPIN) 0.5 mg tablet Take 1 Tablet by mouth daily as needed (Anxiety). FLUoxetine (PROzac) 20 mg capsule Take 1 Capsule by mouth daily. OLANZapine (ZyPREXA) 2.5 mg tablet Take 1 Tablet by mouth nightly. spironolactone (ALDACTONE) 25 mg tablet Take 1 Tablet by mouth daily.     losartan (COZAAR) 100 mg tablet TAKE 1 TABLET BY MOUTH EVERY DAY    metFORMIN ER (GLUCOPHAGE XR) 500 mg tablet TAKE 1 TABLET BY MOUTH ONCE DAILY WITH DINNER    EPINEPHrine (EPIPEN) 0.3 mg/0.3 mL injection INJECT 0.3 ML INTO THE MUSCLE AS DIRECTED ONCE AS NEEDED FOR ALLERGIC RESPONSE. CALL HEALTHCARE PROVIDER OR EMERGENCY ROOM IMMEDIATELY    hydrOXYchloroQUINE (PLAQUENIL) 200 mg tablet Take 200 mg by mouth two (2) times a day. tiZANidine (ZANAFLEX) 6 mg capsule Take 6 mg by mouth nightly. azithromycin (ZITHROMAX) 250 mg tablet Take 1 Tablet by mouth daily. (Patient not taking: Reported on 12/28/2022)    albuterol (ProAir HFA) 90 mcg/actuation inhaler Take 2 Puffs by inhalation every four (4) hours as needed for Wheezing or Shortness of Breath. (Patient not taking: No sig reported)    meloxicam (MOBIC) 7.5 mg tablet Take 7.5 mg by mouth daily as needed. (Patient not taking: Reported on 1/11/2023)       The above histories, medications and allergies have been reviewed. ROS    There were no vitals taken for this visit. Physical Exam  Constitutional:       Appearance: Normal appearance. Cardiovascular:      Rate and Rhythm: Normal rate and regular rhythm. Pulmonary:      Effort: No respiratory distress. Breath sounds: Normal breath sounds. Abdominal:      General: There is no distension. Palpations: Abdomen is soft. There is no mass. Tenderness: There is no abdominal tenderness. Comments: Midline scar well-healed. Neurological:      Mental Status: She is alert. 1. Hx of colonic polyps  Recommend colonoscopy. The procedure was explained in detail including the risks and benefits. Risks shared included risks of missed lesions, incomplete exam, colon injury or perforation. Risks associated with anesthesia were also discussed. The patient wishes to proceed and we will schedule. The patient was counseled at length about the risks of yelena Covid-19 during their perioperative period and any recovery window from their procedure. The patient was made aware that yelena Covid-19  may worsen their prognosis for recovering from their procedure and lend to a higher morbidity and/or mortality risk.   All material risks, benefits, and reasonable alternatives including postponing the procedure were discussed. The patient does  wish to proceed with the procedure at this time.                Jerica Mayes MD

## 2023-01-24 ENCOUNTER — ANESTHESIA (OUTPATIENT)
Dept: SURGERY | Age: 58
End: 2023-01-24
Payer: COMMERCIAL

## 2023-01-24 ENCOUNTER — HOSPITAL ENCOUNTER (OUTPATIENT)
Age: 58
Setting detail: OUTPATIENT SURGERY
Discharge: HOME | End: 2023-01-24
Attending: SURGERY | Admitting: SURGERY
Payer: COMMERCIAL

## 2023-01-24 VITALS
HEIGHT: 62 IN | RESPIRATION RATE: 17 BRPM | DIASTOLIC BLOOD PRESSURE: 69 MMHG | SYSTOLIC BLOOD PRESSURE: 127 MMHG | BODY MASS INDEX: 36.8 KG/M2 | TEMPERATURE: 98.2 F | HEART RATE: 75 BPM | WEIGHT: 200 LBS | OXYGEN SATURATION: 96 %

## 2023-01-24 DIAGNOSIS — Z86.010 HISTORY OF COLON POLYPS: Primary | ICD-10-CM

## 2023-01-24 LAB
GLUCOSE BLD STRIP.AUTO-MCNC: 121 MG/DL (ref 65–117)
SERVICE CMNT-IMP: ABNORMAL

## 2023-01-24 PROCEDURE — 74011250636 HC RX REV CODE- 250/636: Performed by: ANESTHESIOLOGY

## 2023-01-24 PROCEDURE — 74011000250 HC RX REV CODE- 250: Performed by: ANESTHESIOLOGY

## 2023-01-24 PROCEDURE — 45378 DIAGNOSTIC COLONOSCOPY: CPT | Performed by: SURGERY

## 2023-01-24 PROCEDURE — 74011250636 HC RX REV CODE- 250/636: Performed by: SURGERY

## 2023-01-24 PROCEDURE — 76010000138 HC OR TIME 0.5 TO 1 HR: Performed by: SURGERY

## 2023-01-24 PROCEDURE — 76210000063 HC OR PH I REC FIRST 0.5 HR: Performed by: SURGERY

## 2023-01-24 PROCEDURE — 76060000032 HC ANESTHESIA 0.5 TO 1 HR: Performed by: SURGERY

## 2023-01-24 PROCEDURE — 82962 GLUCOSE BLOOD TEST: CPT

## 2023-01-24 PROCEDURE — 2709999900 HC NON-CHARGEABLE SUPPLY: Performed by: SURGERY

## 2023-01-24 RX ORDER — MIDAZOLAM HYDROCHLORIDE 1 MG/ML
INJECTION, SOLUTION INTRAMUSCULAR; INTRAVENOUS AS NEEDED
Status: DISCONTINUED | OUTPATIENT
Start: 2023-01-24 | End: 2023-01-24 | Stop reason: HOSPADM

## 2023-01-24 RX ORDER — SODIUM CHLORIDE, SODIUM LACTATE, POTASSIUM CHLORIDE, CALCIUM CHLORIDE 600; 310; 30; 20 MG/100ML; MG/100ML; MG/100ML; MG/100ML
INJECTION, SOLUTION INTRAVENOUS
Status: DISCONTINUED | OUTPATIENT
Start: 2023-01-24 | End: 2023-01-24 | Stop reason: HOSPADM

## 2023-01-24 RX ORDER — SODIUM CHLORIDE 0.9 % (FLUSH) 0.9 %
5-40 SYRINGE (ML) INJECTION EVERY 8 HOURS
Status: DISCONTINUED | OUTPATIENT
Start: 2023-01-24 | End: 2023-01-24 | Stop reason: HOSPADM

## 2023-01-24 RX ORDER — SODIUM CHLORIDE 0.9 % (FLUSH) 0.9 %
5-40 SYRINGE (ML) INJECTION AS NEEDED
Status: DISCONTINUED | OUTPATIENT
Start: 2023-01-24 | End: 2023-01-24 | Stop reason: HOSPADM

## 2023-01-24 RX ORDER — DIPHENHYDRAMINE HYDROCHLORIDE 50 MG/ML
12.5 INJECTION, SOLUTION INTRAMUSCULAR; INTRAVENOUS
Status: DISCONTINUED | OUTPATIENT
Start: 2023-01-24 | End: 2023-01-24 | Stop reason: HOSPADM

## 2023-01-24 RX ORDER — LIDOCAINE HYDROCHLORIDE 20 MG/ML
INJECTION, SOLUTION EPIDURAL; INFILTRATION; INTRACAUDAL; PERINEURAL AS NEEDED
Status: DISCONTINUED | OUTPATIENT
Start: 2023-01-24 | End: 2023-01-24 | Stop reason: HOSPADM

## 2023-01-24 RX ORDER — SODIUM CHLORIDE, SODIUM LACTATE, POTASSIUM CHLORIDE, CALCIUM CHLORIDE 600; 310; 30; 20 MG/100ML; MG/100ML; MG/100ML; MG/100ML
100 INJECTION, SOLUTION INTRAVENOUS CONTINUOUS
Status: DISCONTINUED | OUTPATIENT
Start: 2023-01-24 | End: 2023-01-24 | Stop reason: HOSPADM

## 2023-01-24 RX ORDER — SODIUM CHLORIDE, SODIUM LACTATE, POTASSIUM CHLORIDE, CALCIUM CHLORIDE 600; 310; 30; 20 MG/100ML; MG/100ML; MG/100ML; MG/100ML
125 INJECTION, SOLUTION INTRAVENOUS CONTINUOUS
Status: DISCONTINUED | OUTPATIENT
Start: 2023-01-24 | End: 2023-01-24 | Stop reason: HOSPADM

## 2023-01-24 RX ORDER — PROPOFOL 10 MG/ML
INJECTION, EMULSION INTRAVENOUS AS NEEDED
Status: DISCONTINUED | OUTPATIENT
Start: 2023-01-24 | End: 2023-01-24 | Stop reason: HOSPADM

## 2023-01-24 RX ORDER — GLYCOPYRROLATE 0.2 MG/ML
INJECTION INTRAMUSCULAR; INTRAVENOUS AS NEEDED
Status: DISCONTINUED | OUTPATIENT
Start: 2023-01-24 | End: 2023-01-24 | Stop reason: HOSPADM

## 2023-01-24 RX ADMIN — PROPOFOL 20 MG: 10 INJECTION, EMULSION INTRAVENOUS at 08:10

## 2023-01-24 RX ADMIN — PROPOFOL 20 MG: 10 INJECTION, EMULSION INTRAVENOUS at 08:04

## 2023-01-24 RX ADMIN — LIDOCAINE HYDROCHLORIDE 15 MG: 20 INJECTION, SOLUTION EPIDURAL; INFILTRATION; INTRACAUDAL; PERINEURAL at 07:54

## 2023-01-24 RX ADMIN — PROPOFOL 30 MG: 10 INJECTION, EMULSION INTRAVENOUS at 07:54

## 2023-01-24 RX ADMIN — LIDOCAINE HYDROCHLORIDE 10 MG: 20 INJECTION, SOLUTION EPIDURAL; INFILTRATION; INTRACAUDAL; PERINEURAL at 07:50

## 2023-01-24 RX ADMIN — PROPOFOL 20 MG: 10 INJECTION, EMULSION INTRAVENOUS at 08:02

## 2023-01-24 RX ADMIN — LIDOCAINE HYDROCHLORIDE 10 MG: 20 INJECTION, SOLUTION EPIDURAL; INFILTRATION; INTRACAUDAL; PERINEURAL at 07:58

## 2023-01-24 RX ADMIN — PROPOFOL 20 MG: 10 INJECTION, EMULSION INTRAVENOUS at 08:08

## 2023-01-24 RX ADMIN — SODIUM CHLORIDE, POTASSIUM CHLORIDE, SODIUM LACTATE AND CALCIUM CHLORIDE 125 ML/HR: 600; 310; 30; 20 INJECTION, SOLUTION INTRAVENOUS at 07:39

## 2023-01-24 RX ADMIN — MIDAZOLAM 2 MG: 1 INJECTION INTRAMUSCULAR; INTRAVENOUS at 07:43

## 2023-01-24 RX ADMIN — PROPOFOL 50 MG: 10 INJECTION, EMULSION INTRAVENOUS at 07:45

## 2023-01-24 RX ADMIN — PROPOFOL 30 MG: 10 INJECTION, EMULSION INTRAVENOUS at 07:52

## 2023-01-24 RX ADMIN — LIDOCAINE HYDROCHLORIDE 10 MG: 20 INJECTION, SOLUTION EPIDURAL; INFILTRATION; INTRACAUDAL; PERINEURAL at 08:00

## 2023-01-24 RX ADMIN — LIDOCAINE HYDROCHLORIDE 15 MG: 20 INJECTION, SOLUTION EPIDURAL; INFILTRATION; INTRACAUDAL; PERINEURAL at 07:48

## 2023-01-24 RX ADMIN — PROPOFOL 20 MG: 10 INJECTION, EMULSION INTRAVENOUS at 08:00

## 2023-01-24 RX ADMIN — PROPOFOL 30 MG: 10 INJECTION, EMULSION INTRAVENOUS at 07:48

## 2023-01-24 RX ADMIN — SODIUM CHLORIDE, POTASSIUM CHLORIDE, SODIUM LACTATE AND CALCIUM CHLORIDE: 600; 310; 30; 20 INJECTION, SOLUTION INTRAVENOUS at 07:43

## 2023-01-24 RX ADMIN — PROPOFOL 20 MG: 10 INJECTION, EMULSION INTRAVENOUS at 07:50

## 2023-01-24 RX ADMIN — PROPOFOL 30 MG: 10 INJECTION, EMULSION INTRAVENOUS at 07:56

## 2023-01-24 RX ADMIN — PROPOFOL 20 MG: 10 INJECTION, EMULSION INTRAVENOUS at 08:06

## 2023-01-24 RX ADMIN — GLYCOPYRROLATE 0.2 MG: 0.4 INJECTION INTRAMUSCULAR; INTRAVENOUS at 07:43

## 2023-01-24 RX ADMIN — PROPOFOL 20 MG: 10 INJECTION, EMULSION INTRAVENOUS at 07:58

## 2023-01-24 RX ADMIN — LIDOCAINE HYDROCHLORIDE 15 MG: 20 INJECTION, SOLUTION EPIDURAL; INFILTRATION; INTRACAUDAL; PERINEURAL at 07:52

## 2023-01-24 RX ADMIN — LIDOCAINE HYDROCHLORIDE 25 MG: 20 INJECTION, SOLUTION EPIDURAL; INFILTRATION; INTRACAUDAL; PERINEURAL at 07:45

## 2023-01-24 NOTE — BRIEF OP NOTE
Brief Postoperative Note    Patient: Fransisco Escamilla  YOB: 1965  MRN: [id number]    Date of Procedure: 1/24/2023     Pre-Op Diagnosis: HX OF COLONIC POLYPS    Post-Op Diagnosis: Same as preoperative diagnosis.       Procedure(s):  COLONOSCOPY    Surgeon(s):  Tiffanie Lopez MD    Surgical Assistant: None    Anesthesia: MAC     Estimated Blood Loss (mL): 0    Complications: None    Specimens: * No specimens in log *     Implants: * No implants in log *    Drains: * No LDAs found *    Findings: Internal hemorrhoids     Electronically Signed by Randolph Waggoner MD on 1/24/2023 at 8:16 AM

## 2023-01-24 NOTE — ANESTHESIA POSTPROCEDURE EVALUATION
Post-Anesthesia Evaluation and Assessment    Cardiovascular Function/Vital Signs  Visit Vitals  /69   Pulse 75   Temp 36.8 °C (98.2 °F)   Resp 17   Ht 5' 2\" (1.575 m)   Wt 90.7 kg (200 lb)   SpO2 96%   BMI 36.58 kg/m²       Patient is status post Procedure(s):  COLONOSCOPY. Nausea/Vomiting: Controlled. Postoperative hydration reviewed and adequate. Pain:  Pain Scale 1: Numeric (0 - 10) (01/24/23 0817)  Pain Intensity 1: 0 (01/24/23 0817)   Managed. Neurological Status:   Neuro (WDL): Within Defined Limits (01/24/23 0817)   At baseline. Mental Status and Level of Consciousness: Arousable. Pulmonary Status:   O2 Device: None (01/24/23 0817)   Adequate oxygenation and airway patent. Complications related to anesthesia: None    Post-anesthesia assessment completed. No concerns. Patient has met all discharge requirements.     Signed By: Mara Wilcox MD    January 24, 2023

## 2023-01-24 NOTE — OP NOTES
[facility]  OPERATIVE REPORT    Name:  Drake Camejo  MR#:  [id number]  :  1965  ACCOUNT #:  [de-identified]  DATE OF SERVICE:  2023      PREOPERATIVE DIAGNOSIS:  Personal history of colon polyps. POSTOPERATIVE DIAGNOSIS:  Personal history of colon polyps. PROCEDURE PERFORMED:  Colonoscopy. SURGEON:  Albert Su MD    ASSISTANT:  None    ANESTHESIA:  MAC.    COMPLICATIONS:  None. SPECIMENS REMOVED:  None. IMPLANTS:  None. ESTIMATED BLOOD LOSS:  Zero. DRAINS:  None. FINDINGS:  Internal hemorrhoids. DISPOSITION:  Stable. DESCRIPTION OF OPERATIVE PROCEDURE:  The patient was brought to the operative theater. Monitoring devices and nasal cannula O2 were placed per Anesthesia and the patient was placed in the left side down decubitus position. IV sedation was administered and a time-out was performed. Digital rectal exam was performed which was essentially normal.  A well-lubricated Olympus colonoscope was introduced in the patient's rectum and advanced to the cecum. We did have to apply gentle abdominal pressure for a very brief amount of time. The cecum was identified by the ileocecal valve and the appendiceal orifice. The terminal ileum was intubated. No abnormalities were noted in the terminal ileum or the cecum. The prep was adequate to proceed. The scope was carefully withdrawn with the mucosal surfaces circumferentially evaluated. No significant abnormalities were noted in the cecum, ascending colon, transverse colon, descending colon, or sigmoid colon. The scope was pulled back in to the rectum and retroflexed which revealed internal hemorrhoids. The scope was then straightened out and carefully withdrawn. The patient tolerated the procedure well and was transferred to PACU in stable condition. Recommendations will be for repeat colonoscopy in 5 years based on a personal history of colon polyps.       Jude Valera, MD [email or web address]  D:  01/24/2023 8:18  T:  01/24/2023 10:00  JOB #:  [id number]  CC:  Sirisha Mittal DO

## 2023-01-24 NOTE — ANESTHESIA PREPROCEDURE EVALUATION
Relevant Problems   RESPIRATORY SYSTEM   (+) Community acquired pneumonia of right upper lobe of lung      NEUROLOGY   (+) Bipolar II disorder, most recent episode major depressive (HCC)      CARDIOVASCULAR   (+) Hypertension      ENDOCRINE   (+) Acquired hypothyroidism   (+) Controlled type 2 diabetes mellitus with complication, without long-term current use of insulin (HCC)   (+) Severe obesity (HCC)      HEMATOLOGY   (+) Asplenia       Anesthetic History        Pertinent negatives: No PONV       Review of Systems / Medical History  Patient summary reviewed, nursing notes reviewed and pertinent labs reviewed    Pulmonary              Pertinent negatives: No asthma and smoker     Neuro/Psych         Psychiatric history (depression, bipolar d/o)  Pertinent negatives: No seizures and CVA   Cardiovascular    Hypertension            Pertinent negatives: No past MI  Exercise tolerance: >4 METS     GI/Hepatic/Renal     GERD (occasional)           Endo/Other    Diabetes (fbs 121): type 2  Hypothyroidism  Obesity (BMI 37)     Other Findings   Comments: Lupus    Fibromyalgia         Physical Exam    Airway  Mallampati: III  TM Distance: 4 - 6 cm  Neck ROM: normal range of motion   Mouth opening: Normal     Cardiovascular    Rhythm: regular  Rate: normal         Dental  No notable dental hx       Pulmonary  Breath sounds clear to auscultation               Abdominal  GI exam deferred       Other Findings            Anesthetic Plan    ASA: 2  Anesthesia type: MAC            Anesthetic plan and risks discussed with: Patient      Plan monitored anesthetic care. Patient understands risks including risk of awareness and agrees with plan. All questions answered. Consent signed.

## 2023-01-24 NOTE — INTERVAL H&P NOTE
Update History & Physical    The Patient's History and Physical of January 23, 2023 was reviewed with the patient and I examined the patient. There was no change. The surgical site was confirmed by the patient and me. Plan:  The risk, benefits, expected outcome, and alternative to the recommended procedure have been discussed with the patient. Patient understands and wants to proceed with the procedure.     Electronically signed by Latoya Maya MD on 1/24/2023 at 7:38 AM

## 2023-03-15 ENCOUNTER — HOSPITAL ENCOUNTER (OUTPATIENT)
Dept: BEHAVIORAL/MENTAL HEALTH | Age: 58
Discharge: HOME | End: 2023-03-15

## 2023-03-15 DIAGNOSIS — F31.81 BIPOLAR II DISORDER (HCC): ICD-10-CM

## 2023-03-15 RX ORDER — CLONAZEPAM 0.5 MG/1
0.5 TABLET ORAL
Qty: 180 TABLET | Refills: 1 | Status: SHIPPED | OUTPATIENT
Start: 2023-03-15

## 2023-03-15 RX ORDER — OLANZAPINE 2.5 MG/1
2.5 TABLET ORAL 2 TIMES DAILY
Qty: 180 TABLET | Refills: 1 | Status: SHIPPED | OUTPATIENT
Start: 2023-03-15

## 2023-03-15 NOTE — PROGRESS NOTES
Consent: The patient and/or their healthcare decision maker is aware that they may receive a bill (including co-pays) for this audio only encounter, depending on their insurance coverage, and has provided verbal consent to proceed. The patient was located in [place], where I am licensed to provide care. INTERVAL HISTORY (Audio Only): Mrs. Addison Liu is a 81-year-old  white female who is following up with me 6 months since her last appointment re: a history of Bipolar disorder (type II) with a predominance of depressive symptoms/episodes, along with some mixed Anxiety symptoms. She had been followed by several psychiatrists in [facility] since 1997, but roughly 4.5 years ago she and her  moved to [place] related to his work, and she wanted to be followed closer to home. 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 She was feeling \"really good\" for awhile after that, but then became more \"rodas\" so we tried to increase the Zyprexa to 5 mg (twice), but it was too sedating. She states that she's been feeling \"OK\" overall, but that her anxiety level has increased slightly. No full panic attacks, but she feels she's on the verge of one fairly often. No real trigger for them either, and we discussed Rx options to help control them. Despite that, her mood has actually been a little better and she denies having any notable dysphoria or other depressive symptoms. Her N/V functioning has been fairly good, and she still feels that the Prozac helps her mood. No manic or hypomanic symptoms at all and she denies having any SE's with the current meds.  She wants to increase the Zyprexa and Klonopin slightly to taking each one twice a day--need to watch out for any sedation with the increased Zyprexa. 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 Has failed Ambien and Seroquel (both activating)--other options include Lunesta, Doxepin, etc. No med SE's reported or noted including no weight gain, EPSE's, TD sx's, etc.      CURRENT MEDICATIONS:  1. Prozac 20 mg daily  2. Zyprexa 2.5 mg qhs--since 2009 (sedated with the 5 mg dose)  3. Clonazepam 0.5 mg daily prn--taking 0.5 mg qhs      MENTAL STATUS EXAM:  Becca Duarte was noted to be very pleasant and cooperative, and exhibited a vuong affective range this time. She denied having any depressive sx's and also denied having any feelings of hopelessness, suicidal thinking, or any passive thoughts about wishing she were dead. Her anxiety level has been stronger, as noted above but there was no evidence of any ebony or hypomania at all. There was no evidence of any psychosis such as hallucinations or delusional thinking. Her judgment and insight appears to be quite good. Her cognitive functioning appears to be fully intact with no deficits noted. DIAGNOSTIC IMPRESSION:  AXIS I:  Bipolar disorder type II--stable at this point (F31.81)                 Mixed Anxiety disorder--slightly worse (F41.9)  AXIS II:  Deferred. AXIS III:  Systemic lupus; fibromyalgia; hypertension; hyperglycemia; hypercholesterolemia     PLAN:  1. Continue the Prozac at 20 mg daily--has 6 months of refills (90 day). 2.  Increase the Zyprexa to 2.5 mg bid--#180 with 1 refill (90 day). 3.  Increase the Clonazepam to 0.5 mg bid prn--#180 with 1 refill (90 day). 4.  Follow up with me in 6 months, sooner if needed. I affirm this is a Patient-Initiated-Episode with a patient who has not had a related appointment within my department in the past 7 days or scheduled within the next 24 hours.   Total Time: 12 minutes  Note: not billable if the call serves to triage the patient into an appointment for the relevant concern. Patient was evaluated by phone call and had no access to a computer or smart phone. Chart reviewed. Evaluated and management per the note above. POS: patient at home; provider in office.

## 2023-05-19 RX ORDER — LEVOTHYROXINE SODIUM 0.1 MG/1
TABLET ORAL
Qty: 90 TABLET | OUTPATIENT
Start: 2023-05-19

## 2023-06-02 ENCOUNTER — TELEPHONE (OUTPATIENT)
Age: 58
End: 2023-06-02

## 2023-08-14 LAB
ESTIMATED AVERAGE GLUCOSE: NORMAL
HBA1C MFR BLD: 5.9 %

## 2023-09-13 ENCOUNTER — HOSPITAL ENCOUNTER (OUTPATIENT)
Facility: HOSPITAL | Age: 58
Discharge: HOME | End: 2023-09-16
Payer: COMMERCIAL

## 2023-09-13 DIAGNOSIS — F31.81 BIPOLAR II DISORDER, MOST RECENT EPISODE MAJOR DEPRESSIVE (HCC): Primary | ICD-10-CM

## 2023-09-13 DIAGNOSIS — F41.3 OTHER MIXED ANXIETY DISORDERS: Chronic | ICD-10-CM

## 2023-09-13 PROCEDURE — 99214 OFFICE O/P EST MOD 30 MIN: CPT | Performed by: PSYCHIATRY & NEUROLOGY

## 2023-09-13 RX ORDER — FLUOXETINE HYDROCHLORIDE 20 MG/1
20 CAPSULE ORAL DAILY
Qty: 90 CAPSULE | Refills: 3 | Status: SHIPPED | OUTPATIENT
Start: 2023-09-13

## 2023-09-13 RX ORDER — OLANZAPINE 2.5 MG/1
2.5 TABLET ORAL 2 TIMES DAILY
Qty: 180 TABLET | Refills: 3 | Status: SHIPPED | OUTPATIENT
Start: 2023-09-13

## 2023-09-13 RX ORDER — CLONAZEPAM 0.5 MG/1
0.5 TABLET ORAL 2 TIMES DAILY PRN
Qty: 180 TABLET | Refills: 1 | Status: SHIPPED | OUTPATIENT
Start: 2023-09-13 | End: 2024-03-11

## 2023-09-13 NOTE — PROGRESS NOTES
INTERVAL HISTORY (In Person): Mrs. Néstor Salcedo is a 52-year-old  white female who is following up with me 6 months since her last appointment re: a history of Bipolar disorder (type II) with a predominance of depressive symptoms/episodes, along with some mixed Anxiety symptoms. She had been followed by several psychiatrists in [facility] since 1997, but roughly 5 years ago she and her  moved to [place] related to his work, and she wanted to be followed closer to home. 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 She was feeling \"really good\" for awhile after that, but then became more \"yañez\" so we tried to increase the Zyprexa to 5 mg (twice), but it was too sedating. Last time, I increased it to 2.5 mg bid along with the Clonazepam to a bid prn dose. She comes in today and states that she's been feeling \"really good\" since the last time, and that she's not had any SE's with the increased Olanzapine or Clonazepam, except for increased appetite after the PM dose. Her mood/affect has been stable and essentially euthymic, and she hasn't had any hypomanic or depressive symptoms at all. She's still had some brief moments of irritability, but that's not been overly severe either. She's taking Ozempic and she's lost 20#, but she'll be taken off it soon and she's concerned about the possible weight gain with the Olanzapine. Discussed Lybalvi, but it may come in a 5/10 mg tab, and we don't know if it's scored or not. She'll check with her pharmacist and see if it's a possibility to try. Otherwise, she's feeling and functioning well, and she's pleased with her stability.  Her N/V functioning has been fairly good, and she still feels that the Prozac helps

## 2023-09-19 ENCOUNTER — TELEPHONE (OUTPATIENT)
Facility: HOSPITAL | Age: 58
End: 2023-09-19

## 2023-09-25 ENCOUNTER — TRANSCRIBE ORDERS (OUTPATIENT)
Facility: HOSPITAL | Age: 58
End: 2023-09-25

## 2023-09-25 DIAGNOSIS — Z12.31 SCREENING MAMMOGRAM FOR HIGH-RISK PATIENT: Primary | ICD-10-CM

## 2023-10-18 ENCOUNTER — HOSPITAL ENCOUNTER (OUTPATIENT)
Facility: HOSPITAL | Age: 58
Discharge: HOME | End: 2023-10-21
Payer: COMMERCIAL

## 2023-10-18 VITALS — BODY MASS INDEX: 36.58 KG/M2 | WEIGHT: 200 LBS

## 2023-10-18 DIAGNOSIS — Z12.31 SCREENING MAMMOGRAM FOR HIGH-RISK PATIENT: ICD-10-CM

## 2023-10-18 PROCEDURE — 77063 BREAST TOMOSYNTHESIS BI: CPT

## 2023-11-28 RX ORDER — LEVOTHYROXINE SODIUM 0.1 MG/1
TABLET ORAL
Qty: 90 TABLET | OUTPATIENT
Start: 2023-11-28

## 2023-12-01 RX ORDER — OLANZAPINE 2.5 MG/1
2.5 TABLET, FILM COATED ORAL NIGHTLY
Qty: 90 TABLET | Refills: 0 | Status: SHIPPED | OUTPATIENT
Start: 2023-12-01

## 2023-12-05 RX ORDER — OLANZAPINE 2.5 MG/1
2.5 TABLET, FILM COATED ORAL 2 TIMES DAILY
Qty: 180 TABLET | Refills: 1 | Status: SHIPPED | OUTPATIENT
Start: 2023-12-05

## 2024-03-13 ENCOUNTER — HOSPITAL ENCOUNTER (OUTPATIENT)
Facility: HOSPITAL | Age: 59
Discharge: HOME | End: 2024-03-16
Payer: COMMERCIAL

## 2024-03-13 DIAGNOSIS — F41.3 OTHER MIXED ANXIETY DISORDERS: Chronic | ICD-10-CM

## 2024-03-13 PROCEDURE — 99214 OFFICE O/P EST MOD 30 MIN: CPT | Performed by: PSYCHIATRY & NEUROLOGY

## 2024-03-13 RX ORDER — CLONAZEPAM 0.5 MG/1
0.5 TABLET ORAL 2 TIMES DAILY PRN
Qty: 180 TABLET | Refills: 1 | Status: SHIPPED | OUTPATIENT
Start: 2024-03-13 | End: 2024-09-09

## 2024-03-13 RX ORDER — TOPIRAMATE 25 MG/1
25 TABLET ORAL EVERY EVENING
Qty: 30 TABLET | Refills: 5 | Status: SHIPPED | OUTPATIENT
Start: 2024-03-13

## 2024-03-13 NOTE — PROGRESS NOTES
INTERVAL HISTORY (In Person): Mrs. Escalante is a 58-year-old  female who is following up with me 6 months since her last appointment re: a history of Bipolar disorder (type II) with a predominance of depressive symptoms/episodes, along with some mixed Anxiety symptoms.  She had been followed by several psychiatrists in [place] since 1997, but roughly 5.5 years ago she and her  moved to [place] related to his work, and she wanted to be followed closer to home. 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 She was feeling \"really good\" for awhile after that, but then became more \"yañez\" so we tried to increase the Zyprexa to 5 mg (twice), but it was too sedating. One year ago, I increased it to 2.5 mg bid along with the Clonazepam to a bid prn dose. We tried to switch to Lybalvi, but her insurance wouldn't cover it.      She comes in today and states that she's been feeling \"really good\" over the last 6 months, and that she still feels that the slightly increased dose of Olanzapine still helps keep her mood stable. She's not had any depressive episodes, nor any hypomania. Her mood appears to be fairly bright, but not elevated. She's also not had any of the mixed anxiety symptoms, and she's generally very pleased with how well she's been feeling and functioning. She states that she's not had any SE's with the Olanzapine but she again has an increased appetite at night, after taking the HS meds. She was taken off the Ozempic, and the appetite came back, and she's gained back the 20# that she'd lost. Dr. Lee hasn't started the TPX as she'd discussed, and I explored that with her and will start this at only 25 mg each evening. Otherwise, she

## 2024-03-22 ENCOUNTER — TELEPHONE (OUTPATIENT)
Facility: HOSPITAL | Age: 59
End: 2024-03-22

## 2024-03-22 RX ORDER — TOPIRAMATE 50 MG/1
50 TABLET, FILM COATED ORAL EVERY EVENING
Qty: 30 TABLET | Refills: 5 | Status: SHIPPED | OUTPATIENT
Start: 2024-03-22

## 2024-06-07 ENCOUNTER — TELEPHONE (OUTPATIENT)
Age: 59
End: 2024-06-07

## 2024-06-07 NOTE — TELEPHONE ENCOUNTER
----- Message from Stephanie Duarte sent at 6/7/2024  8:39 AM EDT -----  Subject: Appointment Request    Reason for Call: Established Patient Appointment needed: New Patient   Request Appointment    QUESTIONS    Reason for appointment request? Requested Provider unavailable - Carine     Additional Information for Provider? Tara's  sees Mary and she   would like to get in with her as well, would she take her as a pt? She   needs to be seen this month if possible & Wed appts work best. Please call   her back. if no ans try [phone]  ---------------------------------------------------------------------------  --------------  CALL BACK INFO  [phone]; OK to leave message on voicemail  ---------------------------------------------------------------------------  --------------  SCRIPT ANSWERS

## 2024-06-14 ENCOUNTER — TELEPHONE (OUTPATIENT)
Facility: HOSPITAL | Age: 59
End: 2024-06-14

## 2024-06-14 NOTE — TELEPHONE ENCOUNTER
Wants to taper off the Klonopin (0.5 mg BID). Will taper by 0.25 mg increments every 10 days (30 days total).

## 2024-07-01 ENCOUNTER — OFFICE VISIT (OUTPATIENT)
Age: 59
End: 2024-07-01
Payer: COMMERCIAL

## 2024-07-01 VITALS
DIASTOLIC BLOOD PRESSURE: 76 MMHG | WEIGHT: 181.2 LBS | TEMPERATURE: 98.2 F | HEIGHT: 62 IN | RESPIRATION RATE: 20 BRPM | OXYGEN SATURATION: 98 % | SYSTOLIC BLOOD PRESSURE: 120 MMHG | BODY MASS INDEX: 33.34 KG/M2 | HEART RATE: 76 BPM

## 2024-07-01 DIAGNOSIS — E11.8 CONTROLLED TYPE 2 DIABETES MELLITUS WITH COMPLICATION, WITHOUT LONG-TERM CURRENT USE OF INSULIN (HCC): ICD-10-CM

## 2024-07-01 DIAGNOSIS — E55.9 VITAMIN D DEFICIENCY: ICD-10-CM

## 2024-07-01 DIAGNOSIS — E78.2 MIXED HYPERLIPIDEMIA: ICD-10-CM

## 2024-07-01 DIAGNOSIS — I10 PRIMARY HYPERTENSION: ICD-10-CM

## 2024-07-01 DIAGNOSIS — Z23 IMMUNIZATION DUE: ICD-10-CM

## 2024-07-01 DIAGNOSIS — F51.01 PRIMARY INSOMNIA: ICD-10-CM

## 2024-07-01 DIAGNOSIS — E03.9 ACQUIRED HYPOTHYROIDISM: ICD-10-CM

## 2024-07-01 DIAGNOSIS — Z11.59 SCREENING FOR VIRAL DISEASE: ICD-10-CM

## 2024-07-01 DIAGNOSIS — Z00.00 WELLNESS EXAMINATION: Primary | ICD-10-CM

## 2024-07-01 PROCEDURE — 3078F DIAST BP <80 MM HG: CPT | Performed by: NURSE PRACTITIONER

## 2024-07-01 PROCEDURE — 90471 IMMUNIZATION ADMIN: CPT | Performed by: NURSE PRACTITIONER

## 2024-07-01 PROCEDURE — 99386 PREV VISIT NEW AGE 40-64: CPT | Performed by: NURSE PRACTITIONER

## 2024-07-01 PROCEDURE — 3074F SYST BP LT 130 MM HG: CPT | Performed by: NURSE PRACTITIONER

## 2024-07-01 PROCEDURE — 36415 COLL VENOUS BLD VENIPUNCTURE: CPT | Performed by: NURSE PRACTITIONER

## 2024-07-01 PROCEDURE — 90734 MENACWYD/MENACWYCRM VACC IM: CPT | Performed by: NURSE PRACTITIONER

## 2024-07-01 RX ORDER — AMITRIPTYLINE HYDROCHLORIDE 10 MG/1
10 TABLET, FILM COATED ORAL NIGHTLY
Qty: 30 TABLET | Refills: 0 | Status: SHIPPED | OUTPATIENT
Start: 2024-07-01

## 2024-07-01 ASSESSMENT — PATIENT HEALTH QUESTIONNAIRE - PHQ9
SUM OF ALL RESPONSES TO PHQ QUESTIONS 1-9: 0
10. IF YOU CHECKED OFF ANY PROBLEMS, HOW DIFFICULT HAVE THESE PROBLEMS MADE IT FOR YOU TO DO YOUR WORK, TAKE CARE OF THINGS AT HOME, OR GET ALONG WITH OTHER PEOPLE: NOT DIFFICULT AT ALL
SUM OF ALL RESPONSES TO PHQ QUESTIONS 1-9: 0
1. LITTLE INTEREST OR PLEASURE IN DOING THINGS: NOT AT ALL
8. MOVING OR SPEAKING SO SLOWLY THAT OTHER PEOPLE COULD HAVE NOTICED. OR THE OPPOSITE, BEING SO FIGETY OR RESTLESS THAT YOU HAVE BEEN MOVING AROUND A LOT MORE THAN USUAL: NOT AT ALL
9. THOUGHTS THAT YOU WOULD BE BETTER OFF DEAD, OR OF HURTING YOURSELF: NOT AT ALL
3. TROUBLE FALLING OR STAYING ASLEEP: NOT AT ALL
SUM OF ALL RESPONSES TO PHQ QUESTIONS 1-9: 0
4. FEELING TIRED OR HAVING LITTLE ENERGY: NOT AT ALL
5. POOR APPETITE OR OVEREATING: NOT AT ALL
6. FEELING BAD ABOUT YOURSELF - OR THAT YOU ARE A FAILURE OR HAVE LET YOURSELF OR YOUR FAMILY DOWN: NOT AT ALL
SUM OF ALL RESPONSES TO PHQ QUESTIONS 1-9: 0
2. FEELING DOWN, DEPRESSED OR HOPELESS: NOT AT ALL
7. TROUBLE CONCENTRATING ON THINGS, SUCH AS READING THE NEWSPAPER OR WATCHING TELEVISION: NOT AT ALL
SUM OF ALL RESPONSES TO PHQ9 QUESTIONS 1 & 2: 0

## 2024-07-01 NOTE — PROGRESS NOTES
Chief Complaint   Patient presents with    Establish Care       ASSESSMENT AND PLAN:       1. Wellness examination      2. Mixed hyperlipidemia      3. Acquired hypothyroidism      4. Primary hypertension    - AR COLLECTION VENOUS BLOOD VENIPUNCTURE  - CBC with Auto Differential; Future  - Comprehensive Metabolic Panel; Future  - Lipid Panel; Future  - TSH; Future  - TSH  - Lipid Panel  - Comprehensive Metabolic Panel  - CBC with Auto Differential    5. BMI 33.0-33.9,adult      6. Primary insomnia      7. Vitamin D deficiency    - AR COLLECTION VENOUS BLOOD VENIPUNCTURE  - Vitamin D 25 Hydroxy; Future  - Vitamin D 25 Hydroxy    8. Screening for viral disease    - AR COLLECTION VENOUS BLOOD VENIPUNCTURE  - HIV 1/2 Ag/Ab, 4TH Generation,W Rflx Confirm; Future  - Hepatitis C Antibody; Future  - Hepatitis C Antibody  - HIV 1/2 Ag/Ab, 4TH Generation,W Rflx Confirm    9. Controlled type 2 diabetes mellitus with complication, without long-term current use of insulin (HCC)    - AR COLLECTION VENOUS BLOOD VENIPUNCTURE  - Hemoglobin A1C; Future  - Microalbumin / Creatinine Urine Ratio; Future  - Microalbumin / Creatinine Urine Ratio  - Hemoglobin A1C    10. Immunization due    - Meningococcal B Vac, Recomb, RONALDO; Inject 0.5 mLs into the muscle once for 1 dose  Dispense: 0.5 mL; Refill: 0  - Meningococcal, MENVEO, (age 2m-55y), IM     Check up labs today. RTC for Trumenda dose #3 when she gets this at the pharmacy. Update HM with Menveo, viral screening labs. Reviewed notes from previous PCP, lab results. Meds reconciled. Trial of amitriptyline for insomnia. May help with fibromyalgia as well.     Patient aware of plan of care and verbalized understanding. Questions answered. RTC Q6 months, or sooner if needed.    HPI:     is a 58 y.o. female in today as a new patient. 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    T2DM: Controlled on metformin, Mounjaro. Last A1c 5.9%    Hypothyroidism:

## 2024-07-01 NOTE — PROGRESS NOTES
\"Have you been to the ER, urgent care clinic since your last visit?  Hospitalized since your last visit?\"    NO    “Have you seen or consulted any other health care providers outside of [facility] since your last visit?”    NO     “Have you had a pap smear?”    NO    No cervical cancer screening on file             Click Here for Release of Records Request

## 2024-07-03 LAB
25(OH)D3 SERPL-MCNC: 45.1 NG/ML (ref 30–100)
ALBUMIN SERPL-MCNC: 4.2 G/DL (ref 3.5–5)
ALBUMIN/GLOB SERPL: 1.3 (ref 1.1–2.2)
ALP SERPL-CCNC: 64 U/L (ref 45–117)
ALT SERPL-CCNC: 63 U/L (ref 12–78)
ANION GAP SERPL CALC-SCNC: 9 MMOL/L (ref 5–15)
AST SERPL-CCNC: 39 U/L (ref 15–37)
BASOPHILS # BLD: 0.1 K/UL (ref 0–0.1)
BASOPHILS NFR BLD: 1 % (ref 0–1)
BILIRUB SERPL-MCNC: 0.4 MG/DL (ref 0.2–1)
BUN SERPL-MCNC: 20 MG/DL (ref 6–20)
BUN/CREAT SERPL: 31 (ref 12–20)
CALCIUM SERPL-MCNC: 10 MG/DL (ref 8.5–10.1)
CHLORIDE SERPL-SCNC: 110 MMOL/L (ref 97–108)
CHOLEST SERPL-MCNC: 137 MG/DL
CO2 SERPL-SCNC: 21 MMOL/L (ref 21–32)
CREAT SERPL-MCNC: 0.65 MG/DL (ref 0.55–1.02)
CREAT UR-MCNC: 127 MG/DL
DIFFERENTIAL METHOD BLD: ABNORMAL
EOSINOPHIL # BLD: 0.4 K/UL (ref 0–0.4)
EOSINOPHIL NFR BLD: 5 % (ref 0–7)
ERYTHROCYTE [DISTWIDTH] IN BLOOD BY AUTOMATED COUNT: 15.6 % (ref 11.5–14.5)
EST. AVERAGE GLUCOSE BLD GHB EST-MCNC: 126 MG/DL
GLOBULIN SER CALC-MCNC: 3.2 G/DL (ref 2–4)
GLUCOSE SERPL-MCNC: 89 MG/DL (ref 65–100)
HBA1C MFR BLD: 6 % (ref 4–5.6)
HCT VFR BLD AUTO: 41.4 % (ref 35–47)
HCV AB SER IA-ACNC: <0.02 INDEX
HCV AB SERPL QL IA: NONREACTIVE
HDLC SERPL-MCNC: 62 MG/DL
HDLC SERPL: 2.2 (ref 0–5)
HGB BLD-MCNC: 13.3 G/DL (ref 11.5–16)
HIV 1+2 AB+HIV1 P24 AG SERPL QL IA: NONREACTIVE
HIV 1/2 RESULT COMMENT: NORMAL
IMM GRANULOCYTES # BLD AUTO: 0 K/UL (ref 0–0.04)
IMM GRANULOCYTES NFR BLD AUTO: 0 % (ref 0–0.5)
LDLC SERPL CALC-MCNC: 52 MG/DL (ref 0–100)
LYMPHOCYTES # BLD: 2.6 K/UL (ref 0.8–3.5)
LYMPHOCYTES NFR BLD: 32 % (ref 12–49)
MCH RBC QN AUTO: 30.2 PG (ref 26–34)
MCHC RBC AUTO-ENTMCNC: 32.1 G/DL (ref 30–36.5)
MCV RBC AUTO: 93.9 FL (ref 80–99)
MICROALBUMIN UR-MCNC: 79.5 MG/DL
MICROALBUMIN/CREAT UR-RTO: 626 MG/G (ref 0–30)
MONOCYTES # BLD: 0.7 K/UL (ref 0–1)
MONOCYTES NFR BLD: 9 % (ref 5–13)
NEUTS SEG # BLD: 4.3 K/UL (ref 1.8–8)
NEUTS SEG NFR BLD: 53 % (ref 32–75)
NRBC # BLD: 0 K/UL (ref 0–0.01)
NRBC BLD-RTO: 0 PER 100 WBC
PLATELET # BLD AUTO: 394 K/UL (ref 150–400)
PMV BLD AUTO: 10.6 FL (ref 8.9–12.9)
POTASSIUM SERPL-SCNC: 4.4 MMOL/L (ref 3.5–5.1)
PROT SERPL-MCNC: 7.4 G/DL (ref 6.4–8.2)
RBC # BLD AUTO: 4.41 M/UL (ref 3.8–5.2)
SODIUM SERPL-SCNC: 140 MMOL/L (ref 136–145)
TRIGL SERPL-MCNC: 115 MG/DL
TSH SERPL DL<=0.05 MIU/L-ACNC: 0.3 UIU/ML (ref 0.36–3.74)
VLDLC SERPL CALC-MCNC: 23 MG/DL
WBC # BLD AUTO: 8.1 K/UL (ref 3.6–11)

## 2024-07-05 ENCOUNTER — PATIENT MESSAGE (OUTPATIENT)
Age: 59
End: 2024-07-05

## 2024-07-05 RX ORDER — LEVOTHYROXINE SODIUM 88 UG/1
TABLET ORAL
Qty: 90 TABLET | Refills: 3 | Status: SHIPPED | OUTPATIENT
Start: 2024-07-05

## 2024-07-22 RX ORDER — OLANZAPINE 2.5 MG/1
2.5 TABLET, FILM COATED ORAL 2 TIMES DAILY
Qty: 180 TABLET | Refills: 3 | Status: SHIPPED | OUTPATIENT
Start: 2024-07-22

## 2024-07-23 RX ORDER — TIZANIDINE HYDROCHLORIDE 6 MG/1
6 CAPSULE, GELATIN COATED ORAL
Status: CANCELLED | OUTPATIENT
Start: 2024-07-23

## 2024-07-23 RX ORDER — TIZANIDINE 4 MG/1
TABLET ORAL
Qty: 180 TABLET | Refills: 3 | Status: SHIPPED | OUTPATIENT
Start: 2024-07-23

## 2024-08-17 ENCOUNTER — PATIENT MESSAGE (OUTPATIENT)
Age: 59
End: 2024-08-17

## 2024-08-17 DIAGNOSIS — E11.8 CONTROLLED TYPE 2 DIABETES MELLITUS WITH COMPLICATION, WITHOUT LONG-TERM CURRENT USE OF INSULIN (HCC): Primary | ICD-10-CM

## 2024-09-11 ENCOUNTER — HOSPITAL ENCOUNTER (OUTPATIENT)
Facility: HOSPITAL | Age: 59
Discharge: HOME | End: 2024-09-14
Payer: COMMERCIAL

## 2024-09-11 PROCEDURE — 99442 PR PHYS/QHP TELEPHONE EVALUATION 11-20 MIN: CPT | Performed by: PSYCHIATRY & NEUROLOGY

## 2024-09-16 DIAGNOSIS — E11.8 CONTROLLED TYPE 2 DIABETES MELLITUS WITH COMPLICATION, WITHOUT LONG-TERM CURRENT USE OF INSULIN (HCC): ICD-10-CM

## 2024-09-17 ENCOUNTER — TELEMEDICINE (OUTPATIENT)
Age: 59
End: 2024-09-17
Payer: COMMERCIAL

## 2024-09-17 DIAGNOSIS — J06.9 VIRAL URI: Primary | ICD-10-CM

## 2024-09-17 PROCEDURE — 99442 PR PHYS/QHP TELEPHONE EVALUATION 11-20 MIN: CPT | Performed by: NURSE PRACTITIONER

## 2024-09-24 ENCOUNTER — TRANSCRIBE ORDERS (OUTPATIENT)
Facility: HOSPITAL | Age: 59
End: 2024-09-24

## 2024-09-24 DIAGNOSIS — Z12.31 SCREENING MAMMOGRAM FOR BREAST CANCER: Primary | ICD-10-CM

## 2024-09-26 ENCOUNTER — PATIENT MESSAGE (OUTPATIENT)
Age: 59
End: 2024-09-26

## 2024-09-26 RX ORDER — DEXTROMETHORPHAN HYDROBROMIDE AND PROMETHAZINE HYDROCHLORIDE 15; 6.25 MG/5ML; MG/5ML
5 SYRUP ORAL 4 TIMES DAILY PRN
Qty: 120 ML | Refills: 0 | Status: SHIPPED | OUTPATIENT
Start: 2024-09-26 | End: 2024-10-03

## 2024-09-26 RX ORDER — DOXYCYCLINE HYCLATE 100 MG
100 TABLET ORAL 2 TIMES DAILY
Qty: 20 TABLET | Refills: 0 | Status: SHIPPED | OUTPATIENT
Start: 2024-09-26 | End: 2024-10-06

## 2024-10-18 ENCOUNTER — OFFICE VISIT (OUTPATIENT)
Age: 59
End: 2024-10-18

## 2024-10-18 VITALS
TEMPERATURE: 98.8 F | DIASTOLIC BLOOD PRESSURE: 60 MMHG | RESPIRATION RATE: 20 BRPM | SYSTOLIC BLOOD PRESSURE: 120 MMHG | WEIGHT: 171.6 LBS | HEART RATE: 78 BPM | BODY MASS INDEX: 31.58 KG/M2 | OXYGEN SATURATION: 98 % | HEIGHT: 62 IN

## 2024-10-18 DIAGNOSIS — E11.8 CONTROLLED TYPE 2 DIABETES MELLITUS WITH COMPLICATION, WITHOUT LONG-TERM CURRENT USE OF INSULIN (HCC): Primary | ICD-10-CM

## 2024-10-18 DIAGNOSIS — E78.2 MIXED HYPERLIPIDEMIA: ICD-10-CM

## 2024-10-18 DIAGNOSIS — E03.9 ACQUIRED HYPOTHYROIDISM: ICD-10-CM

## 2024-10-18 DIAGNOSIS — Z23 NEEDS FLU SHOT: ICD-10-CM

## 2024-10-18 DIAGNOSIS — I10 PRIMARY HYPERTENSION: ICD-10-CM

## 2024-10-18 SDOH — ECONOMIC STABILITY: TRANSPORTATION INSECURITY
IN THE PAST 12 MONTHS, HAS LACK OF TRANSPORTATION KEPT YOU FROM MEETINGS, WORK, OR FROM GETTING THINGS NEEDED FOR DAILY LIVING?: NO

## 2024-10-18 SDOH — HEALTH STABILITY: MENTAL HEALTH: HOW OFTEN DO YOU HAVE A DRINK CONTAINING ALCOHOL?: NEVER

## 2024-10-18 SDOH — SOCIAL STABILITY: SOCIAL NETWORK: HOW OFTEN DO YOU ATTENT MEETINGS OF THE CLUB OR ORGANIZATION YOU BELONG TO?: NEVER

## 2024-10-18 SDOH — HEALTH STABILITY: MENTAL HEALTH
STRESS IS WHEN SOMEONE FEELS TENSE, NERVOUS, ANXIOUS, OR CAN'T SLEEP AT NIGHT BECAUSE THEIR MIND IS TROUBLED. HOW STRESSED ARE YOU?: NOT AT ALL

## 2024-10-18 SDOH — SOCIAL STABILITY: SOCIAL INSECURITY: WITHIN THE LAST YEAR, HAVE YOU BEEN HUMILIATED OR EMOTIONALLY ABUSED IN OTHER WAYS BY YOUR PARTNER OR EX-PARTNER?: NO

## 2024-10-18 SDOH — SOCIAL STABILITY: SOCIAL NETWORK: HOW OFTEN DO YOU GET TOGETHER WITH FRIENDS OR RELATIVES?: MORE THAN THREE TIMES A WEEK

## 2024-10-18 SDOH — SOCIAL STABILITY: SOCIAL NETWORK
DO YOU BELONG TO ANY CLUBS OR ORGANIZATIONS SUCH AS CHURCH GROUPS UNIONS, FRATERNAL OR ATHLETIC GROUPS, OR SCHOOL GROUPS?: NO

## 2024-10-18 SDOH — ECONOMIC STABILITY: FOOD INSECURITY: WITHIN THE PAST 12 MONTHS, THE FOOD YOU BOUGHT JUST DIDN'T LAST AND YOU DIDN'T HAVE MONEY TO GET MORE.: NEVER TRUE

## 2024-10-18 SDOH — SOCIAL STABILITY: SOCIAL INSECURITY
WITHIN THE LAST YEAR, HAVE TO BEEN RAPED OR FORCED TO HAVE ANY KIND OF SEXUAL ACTIVITY BY YOUR PARTNER OR EX-PARTNER?: NO

## 2024-10-18 SDOH — HEALTH STABILITY: PHYSICAL HEALTH: ON AVERAGE, HOW MANY MINUTES DO YOU ENGAGE IN EXERCISE AT THIS LEVEL?: 150+ MIN

## 2024-10-18 SDOH — SOCIAL STABILITY: SOCIAL NETWORK: ARE YOU MARRIED, WIDOWED, DIVORCED, SEPARATED, NEVER MARRIED, OR LIVING WITH A PARTNER?: MARRIED

## 2024-10-18 SDOH — ECONOMIC STABILITY: FOOD INSECURITY: WITHIN THE PAST 12 MONTHS, YOU WORRIED THAT YOUR FOOD WOULD RUN OUT BEFORE YOU GOT MONEY TO BUY MORE.: NEVER TRUE

## 2024-10-18 SDOH — ECONOMIC STABILITY: TRANSPORTATION INSECURITY
IN THE PAST 12 MONTHS, HAS THE LACK OF TRANSPORTATION KEPT YOU FROM MEDICAL APPOINTMENTS OR FROM GETTING MEDICATIONS?: NO

## 2024-10-18 SDOH — ECONOMIC STABILITY: INCOME INSECURITY: IN THE LAST 12 MONTHS, WAS THERE A TIME WHEN YOU WERE NOT ABLE TO PAY THE MORTGAGE OR RENT ON TIME?: NO

## 2024-10-18 SDOH — SOCIAL STABILITY: SOCIAL INSECURITY: WITHIN THE LAST YEAR, HAVE YOU BEEN AFRAID OF YOUR PARTNER OR EX-PARTNER?: NO

## 2024-10-18 SDOH — SOCIAL STABILITY: SOCIAL INSECURITY
WITHIN THE LAST YEAR, HAVE YOU BEEN KICKED, HIT, SLAPPED, OR OTHERWISE PHYSICALLY HURT BY YOUR PARTNER OR EX-PARTNER?: NO

## 2024-10-18 SDOH — HEALTH STABILITY: MENTAL HEALTH: HOW MANY STANDARD DRINKS CONTAINING ALCOHOL DO YOU HAVE ON A TYPICAL DAY?: PATIENT DOES NOT DRINK

## 2024-10-18 SDOH — HEALTH STABILITY: PHYSICAL HEALTH: ON AVERAGE, HOW MANY DAYS PER WEEK DO YOU ENGAGE IN MODERATE TO STRENUOUS EXERCISE (LIKE A BRISK WALK)?: 7 DAYS

## 2024-10-18 SDOH — SOCIAL STABILITY: SOCIAL NETWORK: HOW OFTEN DO YOU ATTEND CHURCH OR RELIGIOUS SERVICES?: NEVER

## 2024-10-18 SDOH — SOCIAL STABILITY: SOCIAL NETWORK
IN A TYPICAL WEEK, HOW MANY TIMES DO YOU TALK ON THE PHONE WITH FAMILY, FRIENDS, OR NEIGHBORS?: MORE THAN THREE TIMES A WEEK

## 2024-10-18 SDOH — ECONOMIC STABILITY: INCOME INSECURITY: HOW HARD IS IT FOR YOU TO PAY FOR THE VERY BASICS LIKE FOOD, HOUSING, MEDICAL CARE, AND HEATING?: NOT HARD AT ALL

## 2024-10-18 ASSESSMENT — PATIENT HEALTH QUESTIONNAIRE - PHQ9
SUM OF ALL RESPONSES TO PHQ QUESTIONS 1-9: 0
10. IF YOU CHECKED OFF ANY PROBLEMS, HOW DIFFICULT HAVE THESE PROBLEMS MADE IT FOR YOU TO DO YOUR WORK, TAKE CARE OF THINGS AT HOME, OR GET ALONG WITH OTHER PEOPLE: NOT DIFFICULT AT ALL
SUM OF ALL RESPONSES TO PHQ QUESTIONS 1-9: 0
SUM OF ALL RESPONSES TO PHQ QUESTIONS 1-9: 0
4. FEELING TIRED OR HAVING LITTLE ENERGY: NOT AT ALL
9. THOUGHTS THAT YOU WOULD BE BETTER OFF DEAD, OR OF HURTING YOURSELF: NOT AT ALL
5. POOR APPETITE OR OVEREATING: NOT AT ALL
SUM OF ALL RESPONSES TO PHQ9 QUESTIONS 1 & 2: 0
7. TROUBLE CONCENTRATING ON THINGS, SUCH AS READING THE NEWSPAPER OR WATCHING TELEVISION: NOT AT ALL
8. MOVING OR SPEAKING SO SLOWLY THAT OTHER PEOPLE COULD HAVE NOTICED. OR THE OPPOSITE, BEING SO FIGETY OR RESTLESS THAT YOU HAVE BEEN MOVING AROUND A LOT MORE THAN USUAL: NOT AT ALL
SUM OF ALL RESPONSES TO PHQ QUESTIONS 1-9: 0
1. LITTLE INTEREST OR PLEASURE IN DOING THINGS: NOT AT ALL
6. FEELING BAD ABOUT YOURSELF - OR THAT YOU ARE A FAILURE OR HAVE LET YOURSELF OR YOUR FAMILY DOWN: NOT AT ALL
2. FEELING DOWN, DEPRESSED OR HOPELESS: NOT AT ALL
3. TROUBLE FALLING OR STAYING ASLEEP: NOT AT ALL

## 2024-10-18 NOTE — PROGRESS NOTES
\"Have you been to the ER, urgent care clinic since your last visit?  Hospitalized since your last visit?\"    NO    “Have you seen or consulted any other health care providers outside our system since your last visit?”    NO     “Have you had a pap smear?”    NO    No cervical cancer screening on file       “Have you had a diabetic eye exam?”    NO     Date of last diabetic eye exam: 9/12/2018          
SOCORRO HawkN - NP

## 2024-10-19 LAB
ALBUMIN SERPL-MCNC: 3.8 G/DL (ref 3.5–5)
ALBUMIN/GLOB SERPL: 1.2 (ref 1.1–2.2)
ALP SERPL-CCNC: 63 U/L (ref 45–117)
ALT SERPL-CCNC: 41 U/L (ref 12–78)
ANION GAP SERPL CALC-SCNC: 5 MMOL/L (ref 2–12)
AST SERPL-CCNC: 19 U/L (ref 15–37)
BASOPHILS # BLD: 0.1 K/UL (ref 0–0.1)
BASOPHILS NFR BLD: 1 % (ref 0–1)
BILIRUB SERPL-MCNC: 0.3 MG/DL (ref 0.2–1)
BUN SERPL-MCNC: 19 MG/DL (ref 6–20)
BUN/CREAT SERPL: 24 (ref 12–20)
CALCIUM SERPL-MCNC: 9.7 MG/DL (ref 8.5–10.1)
CHLORIDE SERPL-SCNC: 108 MMOL/L (ref 97–108)
CHOLEST SERPL-MCNC: 243 MG/DL
CO2 SERPL-SCNC: 28 MMOL/L (ref 21–32)
CREAT SERPL-MCNC: 0.8 MG/DL (ref 0.55–1.02)
DIFFERENTIAL METHOD BLD: ABNORMAL
EOSINOPHIL # BLD: 0.3 K/UL (ref 0–0.4)
EOSINOPHIL NFR BLD: 5 % (ref 0–7)
ERYTHROCYTE [DISTWIDTH] IN BLOOD BY AUTOMATED COUNT: 14.7 % (ref 11.5–14.5)
EST. AVERAGE GLUCOSE BLD GHB EST-MCNC: 120 MG/DL
GLOBULIN SER CALC-MCNC: 3.2 G/DL (ref 2–4)
GLUCOSE SERPL-MCNC: 92 MG/DL (ref 65–100)
HBA1C MFR BLD: 5.8 % (ref 4–5.6)
HCT VFR BLD AUTO: 41.3 % (ref 35–47)
HDLC SERPL-MCNC: 63 MG/DL
HDLC SERPL: 3.9 (ref 0–5)
HGB BLD-MCNC: 13 G/DL (ref 11.5–16)
IMM GRANULOCYTES # BLD AUTO: 0 K/UL (ref 0–0.04)
IMM GRANULOCYTES NFR BLD AUTO: 0 % (ref 0–0.5)
LDLC SERPL CALC-MCNC: 136.8 MG/DL (ref 0–100)
LYMPHOCYTES # BLD: 2.4 K/UL (ref 0.8–3.5)
LYMPHOCYTES NFR BLD: 39 % (ref 12–49)
MCH RBC QN AUTO: 30.5 PG (ref 26–34)
MCHC RBC AUTO-ENTMCNC: 31.5 G/DL (ref 30–36.5)
MCV RBC AUTO: 96.9 FL (ref 80–99)
MONOCYTES # BLD: 0.6 K/UL (ref 0–1)
MONOCYTES NFR BLD: 9 % (ref 5–13)
NEUTS SEG # BLD: 2.9 K/UL (ref 1.8–8)
NEUTS SEG NFR BLD: 46 % (ref 32–75)
NRBC # BLD: 0 K/UL (ref 0–0.01)
NRBC BLD-RTO: 0 PER 100 WBC
PLATELET # BLD AUTO: 418 K/UL (ref 150–400)
PMV BLD AUTO: 10.6 FL (ref 8.9–12.9)
POTASSIUM SERPL-SCNC: 4.5 MMOL/L (ref 3.5–5.1)
PROT SERPL-MCNC: 7 G/DL (ref 6.4–8.2)
RBC # BLD AUTO: 4.26 M/UL (ref 3.8–5.2)
SODIUM SERPL-SCNC: 141 MMOL/L (ref 136–145)
TRIGL SERPL-MCNC: 216 MG/DL
TSH SERPL DL<=0.05 MIU/L-ACNC: 0.45 UIU/ML (ref 0.36–3.74)
VLDLC SERPL CALC-MCNC: 43.2 MG/DL
WBC # BLD AUTO: 6.2 K/UL (ref 3.6–11)

## 2024-10-22 ENCOUNTER — HOSPITAL ENCOUNTER (OUTPATIENT)
Facility: HOSPITAL | Age: 59
Discharge: HOME | End: 2024-10-25
Payer: COMMERCIAL

## 2024-10-22 DIAGNOSIS — Z12.31 SCREENING MAMMOGRAM FOR BREAST CANCER: ICD-10-CM

## 2024-10-22 PROCEDURE — 77063 BREAST TOMOSYNTHESIS BI: CPT

## 2024-11-04 ENCOUNTER — PATIENT MESSAGE (OUTPATIENT)
Age: 59
End: 2024-11-04

## 2024-11-05 RX ORDER — TIRZEPATIDE 10 MG/.5ML
10 INJECTION, SOLUTION SUBCUTANEOUS
Qty: 2 ML | Refills: 5 | Status: SHIPPED | OUTPATIENT
Start: 2024-11-05

## 2024-12-05 RX ORDER — LEVOTHYROXINE SODIUM 88 UG/1
TABLET ORAL
Qty: 90 TABLET | Refills: 3 | Status: SHIPPED | OUTPATIENT
Start: 2024-12-05

## 2024-12-05 RX ORDER — TIRZEPATIDE 10 MG/.5ML
10 INJECTION, SOLUTION SUBCUTANEOUS
Qty: 2 ML | Refills: 5 | Status: SHIPPED | OUTPATIENT
Start: 2024-12-05

## 2024-12-27 ENCOUNTER — TELEPHONE (OUTPATIENT)
Age: 59
End: 2024-12-27

## 2024-12-27 NOTE — TELEPHONE ENCOUNTER
Tara wants to know with her being immuno - compromised should she get the vaccine for whooping cough. Please advise.

## 2024-12-28 NOTE — PROGRESS NOTES
Chief Complaint   Patient presents with   • Hypertension   • Annual Exam     work physical        Hypertension   This is a chronic problem. The problem is controlled. Pertinent negatives include no headaches or malaise/fatigue. Current antihypertension treatment includes ACE inhibitors. The current treatment provides significant improvement. Compliance problems include diet.       He's feeling better, doesn't get headaches and general feeling of malaise. More energy. No side effects or cough.     He eats crappy, always on the go. A lot of fast food lately.     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 He previous had TB skin test which was negative.     Unsure of last tetanus vaccine, had it done at health department in [facility].     Next week has consult for sleep study.     Needs to schedule psych evaluation for ADHD. Starting to see therapist at [facility] for anxiety, depression and ADHD.     He didn't do testicle ultrasound.           Review of Systems   Constitutional: Negative for fatigue and malaise/fatigue.   Respiratory: Negative for cough.    Neurological: Negative for headache.   Psychiatric/Behavioral: Positive for decreased concentration, sleep disturbance and depressed mood. The patient is nervous/anxious.         Current Outpatient Medications   Medication Sig Dispense Refill   • buPROPion XL (WELLBUTRIN XL) 300 MG 24 hr tablet Take 1 tablet by mouth Daily.     • lisinopril (PRINIVIL,ZESTRIL) 10 MG tablet Take 1 tablet by mouth Daily. 30 tablet 5     No current facility-administered medications for this visit.          Allergies   Allergen Reactions   • Sulfa Antibiotics GI Intolerance       Past Medical History:   Diagnosis Date   • Anxiety    • Arthritis    • Depression    • HTN (hypertension)    • Wears glasses         Past Surgical History:   Procedure Laterality Date   • VASECTOMY          Family History   Problem Relation Age of Onset   • Mental illness Mother         bipolar vs  On call tested positive for Covid on Wednesday. 12/25/24. Very mild symptoms no productive cough, no fever,body aches or chills. She is currently not taking Plaquenil.  She has Paxlovid at home .  She is not feeling ill .  Staying hydrated. Holding off on taking Paxlovid . Emilie MCKINNEY    "depression   • Arthritis Father    • Hyperlipidemia Paternal Grandfather    • Hypertension Paternal Grandfather    • Heart attack Paternal Grandfather    • Stroke Paternal Grandfather    • Colon polyps Neg Hx    • Colon cancer Neg Hx         Social History     Socioeconomic History   • Marital status: Single     Spouse name: Julienne Patel   • Number of children: Not on file   • Years of education: Not on file   • Highest education level: Not on file   Tobacco Use   • Smoking status: Former Smoker     Types: Cigars, Cigarettes     Last attempt to quit: 2019     Years since quittin.1   • Smokeless tobacco: Never Used   • Tobacco comment: never been a regular smoker, very little tobacco use. reports maybe 2 cigarettes per month.   Substance and Sexual Activity   • Alcohol use: Yes     Alcohol/week: 3.0 - 4.0 standard drinks     Types: 3 - 4 Glasses of wine per week   • Drug use: Never        Visit Vitals  /72 (BP Location: Left arm, Patient Position: Sitting, Cuff Size: Large Adult)   Pulse 81   Ht 180.3 cm (71\")   Wt 116 kg (255 lb 12.8 oz)   SpO2 96%   BMI 35.68 kg/m²        Physical Exam   Constitutional: He is oriented to person, place, and time. No distress. He is obese.  HENT:   Nose: Nose normal.   Mouth/Throat: Oropharynx is clear and moist.   Eyes: Pupils are equal, round, and reactive to light. Conjunctivae are normal.   Neck: Neck supple. No thyromegaly present.   Cardiovascular: Normal rate and regular rhythm.   No murmur heard.  Pulses:       Posterior tibial pulses are 2+ on the right side, and 2+ on the left side.   Pulmonary/Chest: Effort normal and breath sounds normal.   Abdominal: Soft. There is no hepatosplenomegaly. There is no tenderness.   Musculoskeletal: He exhibits no edema.   Lymphadenopathy:     He has no cervical adenopathy.   Neurological: He is alert and oriented to person, place, and time.   Skin: Skin is warm and dry. No rash noted.   Psychiatric: He has a normal mood and " affect.   Vitals reviewed.      No results found for this or any previous visit.     Frank was seen today for hypertension and annual exam.    Diagnoses and all orders for this visit:    Well adult exam  Counseled on health maintenance topics and preventative care recommendations:   Prior immunization records requested.   Check fasting labs today.   No cancer screenings indicated at this time.   Essential hypertension  -     Comprehensive Metabolic Panel; Future  -     Lipid Panel; Future  -     TSH Rfx On Abnormal To Free T4; Future  -     lisinopril (PRINIVIL,ZESTRIL) 10 MG tablet; Take 1 tablet by mouth Daily.  Improved. Continue lisinopril.   Morbidly obese (CMS/HCC)  -     Comprehensive Metabolic Panel; Future  -     Lipid Panel; Future  -     TSH Rfx On Abnormal To Free T4; Future  Patient's Body mass index is 35.68 kg/m². BMI is above normal parameters. Recommendations include: nutrition counseling.    Screening for deficiency anemia  -     CBC & Differential; Future    Depression, major, recurrent, moderate (CMS/HCC)  Continue with psych.   Attention deficit hyperactivity disorder (ADHD), combined type  Continue with psych.  JERI (generalized anxiety disorder)  Continue with psych.      Return in about 6 months (around 8/22/2020) for Office visit.    Dr. Magaly De Jesus

## 2025-01-08 ENCOUNTER — PATIENT MESSAGE (OUTPATIENT)
Age: 60
End: 2025-01-08

## 2025-01-09 RX ORDER — DEXTROMETHORPHAN HYDROBROMIDE AND PROMETHAZINE HYDROCHLORIDE 15; 6.25 MG/5ML; MG/5ML
5 SYRUP ORAL 4 TIMES DAILY PRN
Qty: 180 ML | Refills: 1 | Status: SHIPPED | OUTPATIENT
Start: 2025-01-09 | End: 2025-01-16

## 2025-01-09 RX ORDER — TIRZEPATIDE 10 MG/.5ML
10 INJECTION, SOLUTION SUBCUTANEOUS
Qty: 2 ML | Refills: 5 | Status: SHIPPED | OUTPATIENT
Start: 2025-01-09

## 2025-01-27 ENCOUNTER — PATIENT MESSAGE (OUTPATIENT)
Age: 60
End: 2025-01-27

## 2025-01-31 RX ORDER — LOSARTAN POTASSIUM 50 MG/1
50 TABLET ORAL DAILY
Qty: 90 TABLET | Refills: 1 | Status: SHIPPED | OUTPATIENT
Start: 2025-01-31

## 2025-02-03 ENCOUNTER — TRANSCRIBE ORDERS (OUTPATIENT)
Facility: HOSPITAL | Age: 60
End: 2025-02-03

## 2025-02-03 DIAGNOSIS — N95.9 UNSPECIFIED MENOPAUSAL AND PERIMENOPAUSAL DISORDER: Primary | ICD-10-CM

## 2025-02-21 ENCOUNTER — PATIENT MESSAGE (OUTPATIENT)
Age: 60
End: 2025-02-21

## 2025-02-21 RX ORDER — OSELTAMIVIR PHOSPHATE 75 MG/1
75 CAPSULE ORAL 2 TIMES DAILY
Qty: 10 CAPSULE | Refills: 0 | Status: SHIPPED | OUTPATIENT
Start: 2025-02-21 | End: 2025-02-26

## 2025-03-05 ENCOUNTER — HOSPITAL ENCOUNTER (OUTPATIENT)
Facility: HOSPITAL | Age: 60
Discharge: HOME | End: 2025-03-08
Payer: COMMERCIAL

## 2025-03-05 PROCEDURE — 99212 OFFICE O/P EST SF 10 MIN: CPT | Performed by: PSYCHIATRY & NEUROLOGY

## 2025-03-05 RX ORDER — TRAZODONE HYDROCHLORIDE 50 MG/1
50 TABLET ORAL
Qty: 30 TABLET | Refills: 5 | Status: SHIPPED | OUTPATIENT
Start: 2025-03-05

## 2025-03-05 NOTE — PROGRESS NOTES
Consent: The patient and/or their healthcare decision maker is aware that they may receive a bill (including co-pays) for this audio only encounter, depending on their insurance coverage, and has provided verbal consent to proceed. The patient was located in [place], where I am licensed to provide care.     CC: \"I'm great\"     INTERVAL HISTORY (Audio Only): Mrs. Escalante is a 59-year-old  female who is following up with me 6- months since her last appointment re: a history of Bipolar disorder (type II) with a predominance of depressive symptoms/episodes, along with some mixed Anxiety symptoms.  She had been followed by several psychiatrists in [place] since 1997, but roughly 6.5 years ago she and her  moved to [place] related to his work, and she wanted to be followed closer to home. 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 She was feeling \"really good\" for awhile after that, but then became more \"yañez\" so we tried to increase the Zyprexa to 5 mg (twice), but it was too sedating. Twenty four months ago, I increased it to 2.5 mg bid along with the Clonazepam to a bid prn dose. We tried to switch to Lybalvi, but her insurance wouldn't cover it. More recently, I started TPX (for weight loss) but she stopped it later on, and since then we've tapered her off the Clonazepam.     She states that she's still been feeling \"really good\" over the last 6 months, and that she hasn't had any exacerbation of depression or hypomania at all. She still feels that the Olanzapine helps keep her mood stable, and she's tolerating the split dosage without having the sedation she had taking 5 mg at one time. She's remained very busy and active--working full

## 2025-03-12 ENCOUNTER — TELEPHONE (OUTPATIENT)
Facility: HOSPITAL | Age: 60
End: 2025-03-12

## 2025-03-12 RX ORDER — DOXEPIN HYDROCHLORIDE 10 MG/1
10 CAPSULE ORAL NIGHTLY PRN
Qty: 30 CAPSULE | Refills: 5 | Status: SHIPPED | OUTPATIENT
Start: 2025-03-12

## 2025-03-12 NOTE — TELEPHONE ENCOUNTER
SE's with Trazodone--constipation and bad dreams. Has also had SE's with Ambien and Seroquel. Will first try Doxepin 10 mg, and consider Lunesta or other meds.

## 2025-03-19 ENCOUNTER — TELEPHONE (OUTPATIENT)
Facility: HOSPITAL | Age: 60
End: 2025-03-19

## 2025-03-19 DIAGNOSIS — F51.01 PRIMARY INSOMNIA: Primary | ICD-10-CM

## 2025-03-19 RX ORDER — ESZOPICLONE 2 MG/1
2 TABLET, FILM COATED ORAL
Qty: 30 TABLET | Refills: 5 | Status: SHIPPED | OUTPATIENT
Start: 2025-03-19 | End: 2025-09-15

## 2025-03-20 RX ORDER — TIRZEPATIDE 10 MG/.5ML
10 INJECTION, SOLUTION SUBCUTANEOUS
Qty: 2 ML | Refills: 5 | OUTPATIENT
Start: 2025-03-20

## 2025-04-15 SDOH — ECONOMIC STABILITY: FOOD INSECURITY: WITHIN THE PAST 12 MONTHS, THE FOOD YOU BOUGHT JUST DIDN'T LAST AND YOU DIDN'T HAVE MONEY TO GET MORE.: NEVER TRUE

## 2025-04-15 SDOH — ECONOMIC STABILITY: FOOD INSECURITY: WITHIN THE PAST 12 MONTHS, YOU WORRIED THAT YOUR FOOD WOULD RUN OUT BEFORE YOU GOT MONEY TO BUY MORE.: NEVER TRUE

## 2025-04-15 SDOH — ECONOMIC STABILITY: INCOME INSECURITY: IN THE LAST 12 MONTHS, WAS THERE A TIME WHEN YOU WERE NOT ABLE TO PAY THE MORTGAGE OR RENT ON TIME?: NO

## 2025-04-16 DIAGNOSIS — F51.01 PRIMARY INSOMNIA: ICD-10-CM

## 2025-04-16 RX ORDER — ESZOPICLONE 3 MG/1
3 TABLET, FILM COATED ORAL
Qty: 30 TABLET | Refills: 5 | Status: SHIPPED | OUTPATIENT
Start: 2025-04-16 | End: 2025-10-13

## 2025-04-17 RX ORDER — TIRZEPATIDE 10 MG/.5ML
10 INJECTION, SOLUTION SUBCUTANEOUS
Qty: 2 ML | Refills: 5 | Status: SHIPPED | OUTPATIENT
Start: 2025-04-17

## 2025-04-18 ENCOUNTER — OFFICE VISIT (OUTPATIENT)
Age: 60
End: 2025-04-18

## 2025-04-18 VITALS
WEIGHT: 170.4 LBS | HEART RATE: 69 BPM | HEIGHT: 62 IN | OXYGEN SATURATION: 98 % | TEMPERATURE: 98.9 F | DIASTOLIC BLOOD PRESSURE: 70 MMHG | BODY MASS INDEX: 31.36 KG/M2 | RESPIRATION RATE: 20 BRPM | SYSTOLIC BLOOD PRESSURE: 130 MMHG

## 2025-04-18 DIAGNOSIS — E03.9 ACQUIRED HYPOTHYROIDISM: ICD-10-CM

## 2025-04-18 DIAGNOSIS — E55.9 VITAMIN D DEFICIENCY: ICD-10-CM

## 2025-04-18 DIAGNOSIS — Z23 IMMUNIZATION DUE: ICD-10-CM

## 2025-04-18 DIAGNOSIS — E11.8 CONTROLLED TYPE 2 DIABETES MELLITUS WITH COMPLICATION, WITHOUT LONG-TERM CURRENT USE OF INSULIN (HCC): ICD-10-CM

## 2025-04-18 DIAGNOSIS — Z00.00 WELLNESS EXAMINATION: Primary | ICD-10-CM

## 2025-04-18 DIAGNOSIS — I10 PRIMARY HYPERTENSION: ICD-10-CM

## 2025-04-18 PROBLEM — R10.9 STOMACH PAIN: Status: RESOLVED | Noted: 2021-05-06 | Resolved: 2025-04-18

## 2025-04-18 PROBLEM — J18.9 COMMUNITY ACQUIRED PNEUMONIA OF RIGHT UPPER LOBE OF LUNG: Status: RESOLVED | Noted: 2022-05-23 | Resolved: 2025-04-18

## 2025-04-18 NOTE — PROGRESS NOTES
\"Have you been to the ER, urgent care clinic since your last visit?  Hospitalized since your last visit?\"    NO    “Have you seen or consulted any other health care providers outside our system since your last visit?”    NO     “Have you had a pap smear?”    NO    No cervical cancer screening on file       “Have you had a diabetic eye exam?”    NO     Date of last diabetic eye exam: 9/12/2018          
     Hypothyroidism: Complaint with levothyroxine 88 mcg.     Bipolar type II, MARIE: Follows closely with Dr. Green. Stable on Zyprexa, fluoxetine. She's tapered herself off of Klonopin without any changes in her anxiety. Now on Lunesta for insomnia.      SLE/fibromyalgia: Controlled on tizanidine, hydroxychloroquine.     Respiratory concerns: Hx splenectomy 1997. Susceptible to bronchitis. She stays up to date on her vaccinations. Pneumovax due again in August 2028, Meningitis due to in 2029.      BMI: Starting weight was 202 lbs. Experiencing weight loss with Mounjaro, healthy diet.      New issues: Tara is in today for her yearly. She feels GREAT. She has seen Dr. Garcia, mammogram and pap are up to date. She received Prevnar 21 at the pharmacy last month. She wonders about Tdap. She is working with Dr. Green for insomnia.   History of Present Illness      Allergies   Allergen Reactions    Latex      Other reaction(s): Unable to Obtain    Macadamia Nut Oil Anaphylaxis    Other Anaphylaxis     Pitted Fruits.    Ciprofloxacin      Other reaction(s): Unable to Obtain    Levofloxacin      Other reaction(s): Unable to Obtain    Morphine      Other reaction(s): Unable to Obtain    Penicillins      Other reaction(s): Unable to Obtain    Vancomycin      Other reaction(s): Unable to Obtain       Prior to Visit Medications    Medication Sig Taking? Authorizing Provider   Tirzepatide (MOUNJARO) 10 MG/0.5ML SOAJ Inject 10 mg into the skin every 7 days Yes Salome Hawk APRN - NP   eszopiclone (LUNESTA) 3 MG TABS Take 1 tablet by mouth nightly as needed (Sleep) for up to 180 days. Max Daily Amount: 3 mg Yes Iker Green MD   losartan (COZAAR) 50 MG tablet Take 1 tablet by mouth daily Yes Salome Hawk APRN - NP   levothyroxine (SYNTHROID) 88 MCG tablet TAKE 1 TABLET BY MOUTH DAILY 1 HOUR BEFORE BREAKFAST Yes Salome Hawk APRN - NP   FLUoxetine (PROZAC) 20 MG capsule Take 1 capsule by mouth daily

## 2025-04-19 LAB
25(OH)D3 SERPL-MCNC: 70.4 NG/ML (ref 30–100)
ALBUMIN SERPL-MCNC: 3.9 G/DL (ref 3.5–5)
ALBUMIN/GLOB SERPL: 1.1 (ref 1.1–2.2)
ALP SERPL-CCNC: 67 U/L (ref 45–117)
ALT SERPL-CCNC: 30 U/L (ref 12–78)
ANION GAP SERPL CALC-SCNC: 5 MMOL/L (ref 2–12)
AST SERPL-CCNC: 20 U/L (ref 15–37)
BASOPHILS # BLD: 0.08 K/UL (ref 0–0.1)
BASOPHILS NFR BLD: 1 % (ref 0–1)
BILIRUB SERPL-MCNC: 0.3 MG/DL (ref 0.2–1)
BUN SERPL-MCNC: 22 MG/DL (ref 6–20)
BUN/CREAT SERPL: 28 (ref 12–20)
CALCIUM SERPL-MCNC: 10.1 MG/DL (ref 8.5–10.1)
CHLORIDE SERPL-SCNC: 107 MMOL/L (ref 97–108)
CHOLEST SERPL-MCNC: 295 MG/DL
CO2 SERPL-SCNC: 30 MMOL/L (ref 21–32)
CREAT SERPL-MCNC: 0.79 MG/DL (ref 0.55–1.02)
DIFFERENTIAL METHOD BLD: ABNORMAL
EOSINOPHIL # BLD: 0.47 K/UL (ref 0–0.4)
EOSINOPHIL NFR BLD: 6 % (ref 0–7)
ERYTHROCYTE [DISTWIDTH] IN BLOOD BY AUTOMATED COUNT: 15.2 % (ref 11.5–14.5)
EST. AVERAGE GLUCOSE BLD GHB EST-MCNC: 114 MG/DL
FOLATE SERPL-MCNC: 21.1 NG/ML (ref 5–21)
GLOBULIN SER CALC-MCNC: 3.5 G/DL (ref 2–4)
GLUCOSE SERPL-MCNC: 99 MG/DL (ref 65–100)
HBA1C MFR BLD: 5.6 % (ref 4–5.6)
HCT VFR BLD AUTO: 43.2 % (ref 35–47)
HDLC SERPL-MCNC: 72 MG/DL
HDLC SERPL: 4.1 (ref 0–5)
HGB BLD-MCNC: 13.5 G/DL (ref 11.5–16)
IMM GRANULOCYTES # BLD AUTO: 0.02 K/UL (ref 0–0.04)
IMM GRANULOCYTES NFR BLD AUTO: 0.3 % (ref 0–0.5)
LDLC SERPL CALC-MCNC: 196.2 MG/DL (ref 0–100)
LYMPHOCYTES # BLD: 2.12 K/UL (ref 0.8–3.5)
LYMPHOCYTES NFR BLD: 27.2 % (ref 12–49)
MCH RBC QN AUTO: 29.9 PG (ref 26–34)
MCHC RBC AUTO-ENTMCNC: 31.3 G/DL (ref 30–36.5)
MCV RBC AUTO: 95.8 FL (ref 80–99)
MONOCYTES # BLD: 0.78 K/UL (ref 0–1)
MONOCYTES NFR BLD: 10 % (ref 5–13)
NEUTS SEG # BLD: 4.32 K/UL (ref 1.8–8)
NEUTS SEG NFR BLD: 55.5 % (ref 32–75)
NRBC # BLD: 0 K/UL (ref 0–0.01)
NRBC BLD-RTO: 0 PER 100 WBC
PLATELET # BLD AUTO: 419 K/UL (ref 150–400)
PMV BLD AUTO: 10.5 FL (ref 8.9–12.9)
POTASSIUM SERPL-SCNC: 4.4 MMOL/L (ref 3.5–5.1)
PROT SERPL-MCNC: 7.4 G/DL (ref 6.4–8.2)
RBC # BLD AUTO: 4.51 M/UL (ref 3.8–5.2)
SODIUM SERPL-SCNC: 142 MMOL/L (ref 136–145)
T4 FREE SERPL-MCNC: 1.5 NG/DL (ref 0.8–1.5)
TRIGL SERPL-MCNC: 134 MG/DL
TSH SERPL DL<=0.05 MIU/L-ACNC: 0.38 UIU/ML (ref 0.36–3.74)
VIT B12 SERPL-MCNC: 600 PG/ML (ref 193–986)
VLDLC SERPL CALC-MCNC: 26.8 MG/DL
WBC # BLD AUTO: 7.8 K/UL (ref 3.6–11)

## 2025-04-21 ENCOUNTER — RESULTS FOLLOW-UP (OUTPATIENT)
Age: 60
End: 2025-04-21

## 2025-04-21 DIAGNOSIS — E78.2 MIXED HYPERLIPIDEMIA: Primary | ICD-10-CM

## 2025-05-14 RX ORDER — TIRZEPATIDE 10 MG/.5ML
10 INJECTION, SOLUTION SUBCUTANEOUS
Qty: 2 ML | Refills: 5 | Status: SHIPPED | OUTPATIENT
Start: 2025-05-14

## 2025-05-20 ENCOUNTER — PATIENT MESSAGE (OUTPATIENT)
Age: 60
End: 2025-05-20
Payer: COMMERCIAL

## 2025-05-20 DIAGNOSIS — Z01.84 IMMUNITY STATUS TESTING: Primary | ICD-10-CM

## 2025-05-20 PROCEDURE — 36415 COLL VENOUS BLD VENIPUNCTURE: CPT | Performed by: NURSE PRACTITIONER

## 2025-06-09 RX ORDER — TIRZEPATIDE 10 MG/.5ML
10 INJECTION, SOLUTION SUBCUTANEOUS
Qty: 2 ML | Refills: 5 | Status: SHIPPED | OUTPATIENT
Start: 2025-06-09

## 2025-06-13 ENCOUNTER — TELEPHONE (OUTPATIENT)
Age: 60
End: 2025-06-13

## 2025-06-13 DIAGNOSIS — E78.2 MIXED HYPERLIPIDEMIA: Primary | ICD-10-CM

## 2025-06-13 DIAGNOSIS — J40 BRONCHITIS: Primary | ICD-10-CM

## 2025-06-13 DIAGNOSIS — Z90.81 H/O SPLENECTOMY: ICD-10-CM

## 2025-06-13 NOTE — TELEPHONE ENCOUNTER
Pt is requesting lipid panel states she has been on Bergamot long enough to see if there is any results.

## 2025-06-16 RX ORDER — OLANZAPINE 2.5 MG/1
2.5 TABLET, FILM COATED ORAL 2 TIMES DAILY
Qty: 180 TABLET | Refills: 3 | Status: SHIPPED | OUTPATIENT
Start: 2025-06-16

## 2025-06-16 RX ORDER — LOSARTAN POTASSIUM 50 MG/1
50 TABLET ORAL DAILY
Qty: 90 TABLET | Refills: 1 | Status: SHIPPED | OUTPATIENT
Start: 2025-06-16

## 2025-06-17 ENCOUNTER — LAB (OUTPATIENT)
Age: 60
End: 2025-06-17

## 2025-06-17 DIAGNOSIS — E78.2 MIXED HYPERLIPIDEMIA: ICD-10-CM

## 2025-06-17 DIAGNOSIS — Z01.84 IMMUNITY STATUS TESTING: ICD-10-CM

## 2025-06-19 LAB
CHOLEST SERPL-MCNC: 262 MG/DL
HDLC SERPL-MCNC: 70 MG/DL
HDLC SERPL: 3.7 (ref 0–5)
LDLC SERPL CALC-MCNC: 162.8 MG/DL (ref 0–100)
TRIGL SERPL-MCNC: 146 MG/DL
VLDLC SERPL CALC-MCNC: 29.2 MG/DL

## 2025-06-20 ENCOUNTER — RESULTS FOLLOW-UP (OUTPATIENT)
Age: 60
End: 2025-06-20
Payer: COMMERCIAL

## 2025-06-20 DIAGNOSIS — E78.2 MIXED HYPERLIPIDEMIA: Primary | ICD-10-CM

## 2025-06-20 PROCEDURE — 36415 COLL VENOUS BLD VENIPUNCTURE: CPT | Performed by: NURSE PRACTITIONER

## 2025-06-21 LAB
MEV IGG SER IA-ACNC: 47 AU/ML
MUV IGG SER IA-ACNC: <9 AU/ML
RUBV IGG SERPL IA-ACNC: 3.43 INDEX

## 2025-06-24 RX ORDER — MENINGOCOCCAL GROUP B VACCINE 60; 60 UG/.5ML; UG/.5ML
0.5 INJECTION, SUSPENSION INTRAMUSCULAR ONCE
Qty: 0.5 ML | Refills: 0 | Status: SHIPPED | OUTPATIENT
Start: 2025-06-24 | End: 2025-06-24

## 2025-07-07 ENCOUNTER — PATIENT MESSAGE (OUTPATIENT)
Age: 60
End: 2025-07-07

## 2025-07-07 DIAGNOSIS — T78.40XS ALLERGIC REACTION, SEQUELA: Primary | ICD-10-CM

## 2025-07-08 RX ORDER — EPINEPHRINE 2 MG/100UL
2 SPRAY NASAL
Qty: 2 EACH | Refills: 1 | Status: SHIPPED | OUTPATIENT
Start: 2025-07-08

## 2025-08-01 RX ORDER — LOSARTAN POTASSIUM 50 MG/1
50 TABLET ORAL DAILY
Qty: 90 TABLET | Refills: 1 | Status: SHIPPED | OUTPATIENT
Start: 2025-08-01

## 2025-08-06 ENCOUNTER — OFFICE VISIT (OUTPATIENT)
Age: 60
End: 2025-08-06
Payer: COMMERCIAL

## 2025-08-06 VITALS
SYSTOLIC BLOOD PRESSURE: 144 MMHG | RESPIRATION RATE: 18 BRPM | OXYGEN SATURATION: 97 % | WEIGHT: 168.4 LBS | BODY MASS INDEX: 30.99 KG/M2 | HEART RATE: 85 BPM | DIASTOLIC BLOOD PRESSURE: 80 MMHG | TEMPERATURE: 97.2 F | HEIGHT: 62 IN

## 2025-08-06 DIAGNOSIS — J40 BRONCHITIS: Primary | ICD-10-CM

## 2025-08-06 LAB
EXP DATE SOLUTION: NORMAL
EXP DATE SWAB: NORMAL
EXPIRATION DATE: NORMAL
LOT NUMBER POC: NORMAL
LOT NUMBER SOLUTION: NORMAL
LOT NUMBER SWAB: NORMAL
SARS-COV-2 RNA, POC: NEGATIVE

## 2025-08-06 PROCEDURE — 99214 OFFICE O/P EST MOD 30 MIN: CPT

## 2025-08-06 PROCEDURE — 87635 SARS-COV-2 COVID-19 AMP PRB: CPT

## 2025-08-06 PROCEDURE — 3077F SYST BP >= 140 MM HG: CPT

## 2025-08-06 PROCEDURE — 3079F DIAST BP 80-89 MM HG: CPT

## 2025-08-06 RX ORDER — DEXTROMETHORPHAN HYDROBROMIDE AND PROMETHAZINE HYDROCHLORIDE 15; 6.25 MG/5ML; MG/5ML
5 SYRUP ORAL 4 TIMES DAILY PRN
Qty: 120 ML | Refills: 0 | Status: SHIPPED | OUTPATIENT
Start: 2025-08-06 | End: 2025-08-13

## 2025-08-06 RX ORDER — METHYLPREDNISOLONE 4 MG/1
TABLET ORAL
Qty: 1 KIT | Refills: 0 | Status: SHIPPED | OUTPATIENT
Start: 2025-08-06 | End: 2025-08-12

## 2025-08-06 RX ORDER — ALBUTEROL SULFATE 90 UG/1
2 INHALANT RESPIRATORY (INHALATION) EVERY 6 HOURS PRN
Qty: 18 G | Refills: 0 | Status: SHIPPED | OUTPATIENT
Start: 2025-08-06

## 2025-08-06 ASSESSMENT — ENCOUNTER SYMPTOMS
SHORTNESS OF BREATH: 0
COUGH: 1
SORE THROAT: 1
WHEEZING: 0

## 2025-08-06 ASSESSMENT — PATIENT HEALTH QUESTIONNAIRE - PHQ9
1. LITTLE INTEREST OR PLEASURE IN DOING THINGS: NOT AT ALL
SUM OF ALL RESPONSES TO PHQ QUESTIONS 1-9: 0
2. FEELING DOWN, DEPRESSED OR HOPELESS: NOT AT ALL

## 2025-08-08 ENCOUNTER — TELEPHONE (OUTPATIENT)
Age: 60
End: 2025-08-08

## 2025-08-11 ENCOUNTER — TELEPHONE (OUTPATIENT)
Age: 60
End: 2025-08-11

## 2025-08-12 ENCOUNTER — APPOINTMENT (OUTPATIENT)
Facility: HOSPITAL | Age: 60
End: 2025-08-12
Payer: COMMERCIAL

## 2025-08-12 ENCOUNTER — HOSPITAL ENCOUNTER (EMERGENCY)
Facility: HOSPITAL | Age: 60
Discharge: HOME | End: 2025-08-12
Attending: EMERGENCY MEDICINE
Payer: COMMERCIAL

## 2025-08-12 VITALS
HEIGHT: 62 IN | HEART RATE: 96 BPM | BODY MASS INDEX: 30.91 KG/M2 | WEIGHT: 168 LBS | OXYGEN SATURATION: 98 % | DIASTOLIC BLOOD PRESSURE: 92 MMHG | SYSTOLIC BLOOD PRESSURE: 176 MMHG | RESPIRATION RATE: 16 BRPM | TEMPERATURE: 98.4 F

## 2025-08-12 DIAGNOSIS — B96.89 BACTERIAL UPPER RESPIRATORY INFECTION: Primary | ICD-10-CM

## 2025-08-12 DIAGNOSIS — J06.9 BACTERIAL UPPER RESPIRATORY INFECTION: Primary | ICD-10-CM

## 2025-08-12 LAB
FLUAV RNA SPEC QL NAA+PROBE: NOT DETECTED
FLUBV RNA SPEC QL NAA+PROBE: NOT DETECTED
SARS-COV-2 RNA RESP QL NAA+PROBE: NOT DETECTED
SOURCE: NORMAL

## 2025-08-12 PROCEDURE — 99284 EMERGENCY DEPT VISIT MOD MDM: CPT

## 2025-08-12 PROCEDURE — 87636 SARSCOV2 & INF A&B AMP PRB: CPT

## 2025-08-12 PROCEDURE — 71046 X-RAY EXAM CHEST 2 VIEWS: CPT

## 2025-08-12 RX ORDER — AZITHROMYCIN 250 MG/1
TABLET, FILM COATED ORAL
Qty: 6 TABLET | Refills: 0 | Status: SHIPPED | OUTPATIENT
Start: 2025-08-12 | End: 2025-08-17

## 2025-08-12 ASSESSMENT — LIFESTYLE VARIABLES
HOW OFTEN DO YOU HAVE A DRINK CONTAINING ALCOHOL: NEVER
HOW MANY STANDARD DRINKS CONTAINING ALCOHOL DO YOU HAVE ON A TYPICAL DAY: PATIENT DOES NOT DRINK

## 2025-08-12 ASSESSMENT — PAIN SCALES - GENERAL: PAINLEVEL_OUTOF10: 0

## 2025-08-12 ASSESSMENT — PAIN - FUNCTIONAL ASSESSMENT
PAIN_FUNCTIONAL_ASSESSMENT: 0-10
PAIN_FUNCTIONAL_ASSESSMENT: 0-10

## 2025-08-26 DIAGNOSIS — J40 BRONCHITIS: ICD-10-CM

## 2025-08-26 RX ORDER — ALBUTEROL SULFATE 90 UG/1
INHALANT RESPIRATORY (INHALATION)
Qty: 6.7 G | Refills: 2 | Status: SHIPPED | OUTPATIENT
Start: 2025-08-26

## (undated) DEVICE — SOL IRR STRL H2O 1000ML BTL --

## (undated) DEVICE — ENDO CARRY-ON PROCEDURE KIT INCLUDES ENZYMATIC SPONGE, GAUZE, BIOHAZARD LABEL, TRAY, LUBRICANT, DIRTY SCOPE LABEL, WATER LABEL, TRAY, DRAWSTRING PAD, AND DEFENDO 4-PIECE KIT.: Brand: ENDO CARRY-ON PROCEDURE KIT

## (undated) DEVICE — FORCEPS ENDOSCP BX L230CM DIA2.8MM ALGTR CUP SPEC RETRV GI

## (undated) DEVICE — SOLUTION IRRIG 1000ML STRL H2O USP PLAS POUR BTL